# Patient Record
Sex: FEMALE | Employment: OTHER | URBAN - METROPOLITAN AREA
[De-identification: names, ages, dates, MRNs, and addresses within clinical notes are randomized per-mention and may not be internally consistent; named-entity substitution may affect disease eponyms.]

---

## 2017-01-22 ENCOUNTER — HOSPITAL ENCOUNTER (EMERGENCY)
Age: 82
Discharge: HOME | End: 2017-01-22
Attending: EMERGENCY MEDICINE
Payer: MEDICARE

## 2017-01-22 ENCOUNTER — APPOINTMENT (OUTPATIENT)
Dept: GENERAL RADIOLOGY | Age: 82
End: 2017-01-22
Attending: EMERGENCY MEDICINE
Payer: MEDICARE

## 2017-01-22 VITALS
RESPIRATION RATE: 14 BRPM | DIASTOLIC BLOOD PRESSURE: 62 MMHG | HEART RATE: 59 BPM | WEIGHT: 148 LBS | HEIGHT: 66 IN | TEMPERATURE: 98.2 F | SYSTOLIC BLOOD PRESSURE: 130 MMHG | OXYGEN SATURATION: 94 % | BODY MASS INDEX: 23.78 KG/M2

## 2017-01-22 DIAGNOSIS — M54.2 NECK PAIN: Primary | ICD-10-CM

## 2017-01-22 PROCEDURE — 99283 EMERGENCY DEPT VISIT LOW MDM: CPT

## 2017-01-22 PROCEDURE — 96374 THER/PROPH/DIAG INJ IV PUSH: CPT

## 2017-01-22 PROCEDURE — 72050 X-RAY EXAM NECK SPINE 4/5VWS: CPT

## 2017-01-22 PROCEDURE — 96375 TX/PRO/DX INJ NEW DRUG ADDON: CPT

## 2017-01-22 PROCEDURE — 74011250636 HC RX REV CODE- 250/636: Performed by: EMERGENCY MEDICINE

## 2017-01-22 RX ORDER — KETOROLAC TROMETHAMINE 30 MG/ML
15 INJECTION, SOLUTION INTRAMUSCULAR; INTRAVENOUS
Status: COMPLETED | OUTPATIENT
Start: 2017-01-22 | End: 2017-01-22

## 2017-01-22 RX ORDER — TRAMADOL HYDROCHLORIDE 50 MG/1
50 TABLET ORAL
Qty: 20 TAB | Refills: 0 | Status: SHIPPED | OUTPATIENT
Start: 2017-01-22 | End: 2018-07-19

## 2017-01-22 RX ORDER — ONDANSETRON 2 MG/ML
8 INJECTION INTRAMUSCULAR; INTRAVENOUS
Status: COMPLETED | OUTPATIENT
Start: 2017-01-22 | End: 2017-01-22

## 2017-01-22 RX ORDER — METHYLPREDNISOLONE 4 MG/1
TABLET ORAL
Qty: 1 DOSE PACK | Refills: 0 | Status: SHIPPED | OUTPATIENT
Start: 2017-01-22 | End: 2018-07-19

## 2017-01-22 RX ORDER — CYCLOBENZAPRINE HCL 5 MG
5 TABLET ORAL
Qty: 15 TAB | Refills: 0 | Status: SHIPPED | OUTPATIENT
Start: 2017-01-22 | End: 2018-07-19

## 2017-01-22 RX ORDER — DEXAMETHASONE SODIUM PHOSPHATE 4 MG/ML
6 INJECTION, SOLUTION INTRA-ARTICULAR; INTRALESIONAL; INTRAMUSCULAR; INTRAVENOUS; SOFT TISSUE
Status: COMPLETED | OUTPATIENT
Start: 2017-01-22 | End: 2017-01-22

## 2017-01-22 RX ORDER — MORPHINE SULFATE 2 MG/ML
4 INJECTION, SOLUTION INTRAMUSCULAR; INTRAVENOUS
Status: COMPLETED | OUTPATIENT
Start: 2017-01-22 | End: 2017-01-22

## 2017-01-22 RX ADMIN — Medication 4 MG: at 15:57

## 2017-01-22 RX ADMIN — DEXAMETHASONE SODIUM PHOSPHATE 6 MG: 4 INJECTION, SOLUTION INTRAMUSCULAR; INTRAVENOUS at 15:58

## 2017-01-22 RX ADMIN — KETOROLAC TROMETHAMINE 15 MG: 30 INJECTION, SOLUTION INTRAMUSCULAR at 14:47

## 2017-01-22 RX ADMIN — ONDANSETRON 8 MG: 2 INJECTION INTRAMUSCULAR; INTRAVENOUS at 15:58

## 2017-01-22 NOTE — Clinical Note
Home to rest and use warm moist compresses to the left side of the neck four times a day for 20 minutes at a time. See your own MD for continued difficulty. It is possible for you to have a nerve root impingement producing these symptoms.

## 2017-01-22 NOTE — ED PROVIDER NOTES
HPI Comments: [de-identified] y.o. female with past medical history significant for HTN, fluid retention in legs, L IFRAH, and breast cancer  who presents accompanied by her  with chief complaint of neck pain. Pt c/o severe gradually worsening pain along the L side of her posterior and lateral neck. The pt says that her neck pain worsened after she was looking down for an extended period of time while sowing. Pt reports a hx of intermittent neck pain for the past 2-3 years after she hurt her neck in hyperextension while painting a ceiling. The pt says that she never saw an orthopedist after originally hurting her neck. Pt reports some lower back pain. Pt says that Tylenol has failed to offer significant relief and states that she does not want narcotics. Denies radicular pain in her arms and legs. Denies any numbness. There are no other acute medical concerns at this time. Social hx: Current smoker. PCP: Christine Howard MD    Note written by Kathy Cuellar, as dictated by Casper Myers MD 2:36 PM       The history is provided by the patient. No  was used. Past Medical History:   Diagnosis Date    Breast CA ([address]     Fluid retention in legs     Hypertension        Past Surgical History:   Procedure Laterality Date    Hx orthopaedic  2007     left hip replacement         History reviewed. No pertinent family history. Social History     Social History    Marital status:      Spouse name: N/A    Number of children: N/A    Years of education: N/A     Occupational History    Not on file. Social History Main Topics    Smoking status: Current Every Day Smoker    Smokeless tobacco: Not on file    Alcohol use 0.0 oz/week    Drug use: Not on file    Sexual activity: Not on file     Other Topics Concern    Not on file     Social History Narrative         ALLERGIES: Darvocet a500 [propoxyphene n-acetaminophen];  Percocet [oxycodone-acetaminophen]; and Rocephin [ceftriaxone]    Review of Systems   Constitutional: Negative for activity change, appetite change and fatigue. HENT: Negative for ear pain, facial swelling, sore throat and trouble swallowing. Eyes: Negative for pain, discharge and visual disturbance. Respiratory: Negative for chest tightness, shortness of breath and wheezing. Cardiovascular: Negative for chest pain and palpitations. Gastrointestinal: Negative for abdominal pain, blood in stool, nausea and vomiting. Genitourinary: Negative for difficulty urinating, flank pain and hematuria. Musculoskeletal: Positive for neck pain (left). Negative for arthralgias, joint swelling and myalgias. Skin: Negative for color change and rash. Neurological: Negative for dizziness, weakness, numbness and headaches. Hematological: Negative for adenopathy. Does not bruise/bleed easily. Psychiatric/Behavioral: Negative for behavioral problems, confusion and sleep disturbance. All other systems reviewed and are negative. Vitals:    01/22/17 1232   BP: 168/87   Pulse: 98   Resp: 14   Temp: 98 °F (36.7 °C)   SpO2: 99%   Weight: 67.1 kg (148 lb)   Height: 5' 6\" (1.676 m)            Physical Exam   Constitutional: She is oriented to person, place, and time. She appears well-developed and well-nourished. No distress. She is in obvious distress. HENT:   Head: Normocephalic and atraumatic. Nose: Nose normal.   Mouth/Throat: Oropharynx is clear and moist.   Eyes: Conjunctivae and EOM are normal. Pupils are equal, round, and reactive to light. No scleral icterus. Neck: Normal range of motion. Neck supple. No JVD present. Muscular tenderness (from the base of the skull left to the midline, down the lateral neck, to the dorsal L shoulder) present. No spinous process tenderness present. No tracheal deviation present. No thyromegaly present. No muscle spasms noted. Nontender along the R side of the neck. No carotid bruits noted.    Cardiovascular: Normal rate, regular rhythm, normal heart sounds and intact distal pulses. Exam reveals no gallop and no friction rub. No murmur heard. Pulmonary/Chest: Effort normal and breath sounds normal. No respiratory distress. She has no wheezes. She has no rales. She exhibits no tenderness. Abdominal: Soft. Bowel sounds are normal. She exhibits no distension and no mass. There is no tenderness. There is no rebound and no guarding. Musculoskeletal: Normal range of motion. She exhibits no edema or tenderness. Lymphadenopathy:     She has no cervical adenopathy. Neurological: She is alert and oriented to person, place, and time. She has normal reflexes. No cranial nerve deficit. Coordination normal.   No peripheral motor sensory deficits. Skin: Skin is warm and dry. No rash noted. No erythema. Psychiatric: She has a normal mood and affect. Her behavior is normal. Judgment and thought content normal.   Nursing note and vitals reviewed. Note written by Kathy Triana, as dictated by Rob Bloch, MD 2:18 PM    MDM  Number of Diagnoses or Management Options  Neck pain: new and requires workup     Amount and/or Complexity of Data Reviewed  Clinical lab tests: ordered and reviewed  Tests in the radiology section of CPT®: ordered and reviewed  Decide to obtain previous medical records or to obtain history from someone other than the patient: yes  Review and summarize past medical records: yes  Discuss the patient with other providers: yes  Independent visualization of images, tracings, or specimens: yes    Risk of Complications, Morbidity, and/or Mortality  Presenting problems: high  Diagnostic procedures: high  Management options: high    Patient Progress  Patient progress: stable    ED Course       Procedures      PROGRESS NOTE:  3:38 PM Pt is not a whole lot better with Toradol. We will try morphine and Zofran.

## 2017-01-22 NOTE — ED TRIAGE NOTES
Triage Note:  Pt arrived complaining of neck pain. Francestrent Frank been doing a lot of pushing. I hurt my neck a few years ago. \"  Onset 3 days ago.

## 2018-07-19 ENCOUNTER — HOSPITAL ENCOUNTER (INPATIENT)
Age: 83
LOS: 2 days | Discharge: HOME HEALTH | DRG: 871 | End: 2018-07-21
Attending: EMERGENCY MEDICINE | Admitting: FAMILY MEDICINE
Payer: MEDICARE

## 2018-07-19 ENCOUNTER — APPOINTMENT (OUTPATIENT)
Dept: GENERAL RADIOLOGY | Age: 83
DRG: 871 | End: 2018-07-19
Attending: PHYSICIAN ASSISTANT
Payer: MEDICARE

## 2018-07-19 ENCOUNTER — APPOINTMENT (OUTPATIENT)
Dept: CT IMAGING | Age: 83
DRG: 871 | End: 2018-07-19
Attending: FAMILY MEDICINE
Payer: MEDICARE

## 2018-07-19 DIAGNOSIS — R50.9 FEVER, UNSPECIFIED FEVER CAUSE: ICD-10-CM

## 2018-07-19 DIAGNOSIS — N39.0 URINARY TRACT INFECTION WITH HEMATURIA, SITE UNSPECIFIED: Primary | ICD-10-CM

## 2018-07-19 DIAGNOSIS — R31.9 URINARY TRACT INFECTION WITH HEMATURIA, SITE UNSPECIFIED: Primary | ICD-10-CM

## 2018-07-19 DIAGNOSIS — R41.82 ALTERED MENTAL STATUS, UNSPECIFIED ALTERED MENTAL STATUS TYPE: ICD-10-CM

## 2018-07-19 LAB
ALBUMIN SERPL-MCNC: 3.4 G/DL (ref 3.5–5)
ALBUMIN/GLOB SERPL: 0.9 {RATIO} (ref 1.1–2.2)
ALP SERPL-CCNC: 60 U/L (ref 45–117)
ALT SERPL-CCNC: 16 U/L (ref 12–78)
ANION GAP SERPL CALC-SCNC: 11 MMOL/L (ref 5–15)
APPEARANCE UR: ABNORMAL
AST SERPL-CCNC: 18 U/L (ref 15–37)
ATRIAL RATE: 78 BPM
BACTERIA URNS QL MICRO: ABNORMAL /HPF
BASOPHILS # BLD: 0 K/UL (ref 0–0.1)
BASOPHILS NFR BLD: 0 % (ref 0–1)
BILIRUB SERPL-MCNC: 1 MG/DL (ref 0.2–1)
BILIRUB UR QL: NEGATIVE
BUN SERPL-MCNC: 18 MG/DL (ref 6–20)
BUN/CREAT SERPL: 17 (ref 12–20)
CALCIUM SERPL-MCNC: 8.6 MG/DL (ref 8.5–10.1)
CALCULATED P AXIS, ECG09: 85 DEGREES
CALCULATED R AXIS, ECG10: -40 DEGREES
CALCULATED T AXIS, ECG11: 119 DEGREES
CHLORIDE SERPL-SCNC: 99 MMOL/L (ref 97–108)
CO2 SERPL-SCNC: 26 MMOL/L (ref 21–32)
COLOR UR: ABNORMAL
CREAT SERPL-MCNC: 1.06 MG/DL (ref 0.55–1.02)
DIAGNOSIS, 93000: NORMAL
DIFFERENTIAL METHOD BLD: ABNORMAL
EOSINOPHIL # BLD: 0 K/UL (ref 0–0.4)
EOSINOPHIL NFR BLD: 0 % (ref 0–7)
EPITH CASTS URNS QL MICRO: ABNORMAL /LPF
ERYTHROCYTE [DISTWIDTH] IN BLOOD BY AUTOMATED COUNT: 14.3 % (ref 11.5–14.5)
GLOBULIN SER CALC-MCNC: 3.8 G/DL (ref 2–4)
GLUCOSE SERPL-MCNC: 110 MG/DL (ref 65–100)
GLUCOSE UR STRIP.AUTO-MCNC: NEGATIVE MG/DL
HCT VFR BLD AUTO: 38.8 % (ref 35–47)
HGB BLD-MCNC: 12.9 G/DL (ref 11.5–16)
HGB UR QL STRIP: ABNORMAL
IMM GRANULOCYTES # BLD: 0.1 K/UL (ref 0–0.04)
IMM GRANULOCYTES NFR BLD AUTO: 0 % (ref 0–0.5)
KETONES UR QL STRIP.AUTO: 15 MG/DL
LACTATE SERPL-SCNC: 1.4 MMOL/L (ref 0.4–2)
LEUKOCYTE ESTERASE UR QL STRIP.AUTO: ABNORMAL
LYMPHOCYTES # BLD: 1.4 K/UL (ref 0.8–3.5)
LYMPHOCYTES NFR BLD: 9 % (ref 12–49)
MCH RBC QN AUTO: 33.9 PG (ref 26–34)
MCHC RBC AUTO-ENTMCNC: 33.2 G/DL (ref 30–36.5)
MCV RBC AUTO: 102.1 FL (ref 80–99)
MONOCYTES # BLD: 2 K/UL (ref 0–1)
MONOCYTES NFR BLD: 12 % (ref 5–13)
NEUTS SEG # BLD: 12.8 K/UL (ref 1.8–8)
NEUTS SEG NFR BLD: 79 % (ref 32–75)
NITRITE UR QL STRIP.AUTO: NEGATIVE
NRBC # BLD: 0 K/UL (ref 0–0.01)
NRBC BLD-RTO: 0 PER 100 WBC
P-R INTERVAL, ECG05: 150 MS
PH UR STRIP: 5.5 [PH] (ref 5–8)
PLATELET # BLD AUTO: 133 K/UL (ref 150–400)
PMV BLD AUTO: 12.4 FL (ref 8.9–12.9)
POTASSIUM SERPL-SCNC: 3.3 MMOL/L (ref 3.5–5.1)
PROT SERPL-MCNC: 7.2 G/DL (ref 6.4–8.2)
PROT UR STRIP-MCNC: 30 MG/DL
Q-T INTERVAL, ECG07: 444 MS
QRS DURATION, ECG06: 170 MS
QTC CALCULATION (BEZET), ECG08: 506 MS
RBC # BLD AUTO: 3.8 M/UL (ref 3.8–5.2)
RBC #/AREA URNS HPF: ABNORMAL /HPF (ref 0–5)
SODIUM SERPL-SCNC: 136 MMOL/L (ref 136–145)
SP GR UR REFRACTOMETRY: 1.01 (ref 1–1.03)
TROPONIN I SERPL-MCNC: <0.05 NG/ML
TSH SERPL DL<=0.05 MIU/L-ACNC: 0.49 UIU/ML (ref 0.36–3.74)
UR CULT HOLD, URHOLD: NORMAL
UROBILINOGEN UR QL STRIP.AUTO: 0.2 EU/DL (ref 0.2–1)
VENTRICULAR RATE, ECG03: 78 BPM
VIT B12 SERPL-MCNC: 303 PG/ML (ref 193–986)
WBC # BLD AUTO: 16.3 K/UL (ref 3.6–11)
WBC URNS QL MICRO: >100 /HPF (ref 0–4)

## 2018-07-19 PROCEDURE — 74011250637 HC RX REV CODE- 250/637: Performed by: EMERGENCY MEDICINE

## 2018-07-19 PROCEDURE — 70450 CT HEAD/BRAIN W/O DYE: CPT

## 2018-07-19 PROCEDURE — 87077 CULTURE AEROBIC IDENTIFY: CPT | Performed by: EMERGENCY MEDICINE

## 2018-07-19 PROCEDURE — 93005 ELECTROCARDIOGRAM TRACING: CPT

## 2018-07-19 PROCEDURE — 74011250636 HC RX REV CODE- 250/636: Performed by: EMERGENCY MEDICINE

## 2018-07-19 PROCEDURE — 96360 HYDRATION IV INFUSION INIT: CPT

## 2018-07-19 PROCEDURE — 96361 HYDRATE IV INFUSION ADD-ON: CPT

## 2018-07-19 PROCEDURE — 82607 VITAMIN B-12: CPT | Performed by: EMERGENCY MEDICINE

## 2018-07-19 PROCEDURE — 65660000000 HC RM CCU STEPDOWN

## 2018-07-19 PROCEDURE — 71046 X-RAY EXAM CHEST 2 VIEWS: CPT

## 2018-07-19 PROCEDURE — 36415 COLL VENOUS BLD VENIPUNCTURE: CPT | Performed by: PHYSICIAN ASSISTANT

## 2018-07-19 PROCEDURE — 87086 URINE CULTURE/COLONY COUNT: CPT | Performed by: EMERGENCY MEDICINE

## 2018-07-19 PROCEDURE — 80053 COMPREHEN METABOLIC PANEL: CPT | Performed by: PHYSICIAN ASSISTANT

## 2018-07-19 PROCEDURE — 84484 ASSAY OF TROPONIN QUANT: CPT | Performed by: PHYSICIAN ASSISTANT

## 2018-07-19 PROCEDURE — 81001 URINALYSIS AUTO W/SCOPE: CPT | Performed by: PHYSICIAN ASSISTANT

## 2018-07-19 PROCEDURE — 74011250637 HC RX REV CODE- 250/637: Performed by: FAMILY MEDICINE

## 2018-07-19 PROCEDURE — 85025 COMPLETE CBC W/AUTO DIFF WBC: CPT | Performed by: PHYSICIAN ASSISTANT

## 2018-07-19 PROCEDURE — 74011250636 HC RX REV CODE- 250/636: Performed by: FAMILY MEDICINE

## 2018-07-19 PROCEDURE — 99284 EMERGENCY DEPT VISIT MOD MDM: CPT

## 2018-07-19 PROCEDURE — 83605 ASSAY OF LACTIC ACID: CPT | Performed by: PHYSICIAN ASSISTANT

## 2018-07-19 PROCEDURE — 87186 SC STD MICRODIL/AGAR DIL: CPT | Performed by: EMERGENCY MEDICINE

## 2018-07-19 PROCEDURE — 84443 ASSAY THYROID STIM HORMONE: CPT | Performed by: EMERGENCY MEDICINE

## 2018-07-19 PROCEDURE — 87040 BLOOD CULTURE FOR BACTERIA: CPT | Performed by: PHYSICIAN ASSISTANT

## 2018-07-19 RX ORDER — ONDANSETRON 2 MG/ML
4 INJECTION INTRAMUSCULAR; INTRAVENOUS
Status: CANCELLED | OUTPATIENT
Start: 2018-07-19

## 2018-07-19 RX ORDER — LEVOFLOXACIN 5 MG/ML
750 INJECTION, SOLUTION INTRAVENOUS EVERY 24 HOURS
Status: DISCONTINUED | OUTPATIENT
Start: 2018-07-19 | End: 2018-07-19 | Stop reason: DRUGHIGH

## 2018-07-19 RX ORDER — POTASSIUM CHLORIDE 750 MG/1
10 TABLET, FILM COATED, EXTENDED RELEASE ORAL
Status: COMPLETED | OUTPATIENT
Start: 2018-07-19 | End: 2018-07-19

## 2018-07-19 RX ORDER — LEVOTHYROXINE SODIUM 75 UG/1
75 TABLET ORAL
COMMUNITY
Start: 2020-02-12 | End: 2022-10-21

## 2018-07-19 RX ORDER — SODIUM CHLORIDE 9 MG/ML
75 INJECTION, SOLUTION INTRAVENOUS CONTINUOUS
Status: DISCONTINUED | OUTPATIENT
Start: 2018-07-19 | End: 2018-07-21 | Stop reason: HOSPADM

## 2018-07-19 RX ORDER — ESCITALOPRAM OXALATE 10 MG/1
5 TABLET ORAL DAILY
Status: CANCELLED | OUTPATIENT
Start: 2018-07-20

## 2018-07-19 RX ORDER — LEVOFLOXACIN 5 MG/ML
750 INJECTION, SOLUTION INTRAVENOUS
Status: DISCONTINUED | OUTPATIENT
Start: 2018-07-21 | End: 2018-07-20

## 2018-07-19 RX ORDER — ESCITALOPRAM OXALATE 20 MG/1
20 TABLET ORAL DAILY
COMMUNITY

## 2018-07-19 RX ORDER — LEVOFLOXACIN 5 MG/ML
750 INJECTION, SOLUTION INTRAVENOUS
Status: COMPLETED | OUTPATIENT
Start: 2018-07-19 | End: 2018-07-19

## 2018-07-19 RX ORDER — LEVOTHYROXINE SODIUM 50 UG/1
100 TABLET ORAL
Status: DISCONTINUED | OUTPATIENT
Start: 2018-07-20 | End: 2018-07-21 | Stop reason: HOSPADM

## 2018-07-19 RX ORDER — SODIUM CHLORIDE 0.9 % (FLUSH) 0.9 %
5-10 SYRINGE (ML) INJECTION EVERY 8 HOURS
Status: DISCONTINUED | OUTPATIENT
Start: 2018-07-19 | End: 2018-07-21 | Stop reason: HOSPADM

## 2018-07-19 RX ORDER — SODIUM CHLORIDE 0.9 % (FLUSH) 0.9 %
5-10 SYRINGE (ML) INJECTION AS NEEDED
Status: DISCONTINUED | OUTPATIENT
Start: 2018-07-19 | End: 2018-07-21 | Stop reason: HOSPADM

## 2018-07-19 RX ORDER — ACETAMINOPHEN 325 MG/1
650 TABLET ORAL
Status: COMPLETED | OUTPATIENT
Start: 2018-07-19 | End: 2018-07-19

## 2018-07-19 RX ORDER — ACETAMINOPHEN 325 MG/1
650 TABLET ORAL
Status: DISCONTINUED | OUTPATIENT
Start: 2018-07-19 | End: 2018-07-21 | Stop reason: HOSPADM

## 2018-07-19 RX ADMIN — LEVOFLOXACIN 750 MG: 5 INJECTION, SOLUTION INTRAVENOUS at 16:53

## 2018-07-19 RX ADMIN — ACETAMINOPHEN 650 MG: 325 TABLET ORAL at 16:53

## 2018-07-19 RX ADMIN — Medication 10 ML: at 22:00

## 2018-07-19 RX ADMIN — SODIUM CHLORIDE 75 ML/HR: 900 INJECTION, SOLUTION INTRAVENOUS at 19:46

## 2018-07-19 RX ADMIN — SODIUM CHLORIDE 1000 ML: 900 INJECTION, SOLUTION INTRAVENOUS at 14:47

## 2018-07-19 RX ADMIN — SODIUM CHLORIDE 1000 ML: 900 INJECTION, SOLUTION INTRAVENOUS at 16:53

## 2018-07-19 RX ADMIN — POTASSIUM CHLORIDE 10 MEQ: 750 TABLET, EXTENDED RELEASE ORAL at 18:06

## 2018-07-19 NOTE — ED TRIAGE NOTES
Triage Note: Patient is coming in for chills and generalized weakness for the past 2 days. Patient started in PT last week for balance issues but has not gone this week due to the increased weakness. Daughter is concerned she might have a UTI and having more memory issues.

## 2018-07-19 NOTE — H&P
[address]   HISTORY AND PHYSICAL      [place]  MR#: [id number]  : 1935  ACCOUNT #: [de-identified]   ADMIT DATE: 2018    CHIEF COMPLAINT:  Weakness. HISTORY OF PRESENT ILLNESS:  The patient is an 71-year-old female with a past medical history of depression, recurrent UTIs and history of hypothyroidism who presents to the hospital with above the symptoms. History was obtained from the patient and the patient's daughter-in-law. The daughter-in-law reports that the patient was recently diagnosed with UTI. Took her antibiotic, Bactrim, for a few days, but did not complete her antibiotic course as [facility] was feeling better. \"  Daughter-in-law reports that she was called yesterday by her father-in-law stating that the patient is somewhat confused, is not able to fix him dinner and is more lethargic and weak. This morning the father-in-law called her again and told her that the patient is more confused, is very weak and is not able to get out of bed. The patient was brought to the ER for further management and evaluation. The patient also had a fever of 101.1 on arrival to the ER. The patient is resting in bed, appears to be weak, but lethargic. Denies any other complaints or problems. Denies any headache, blurry vision, sore throat, trouble swallowing, trouble with speech, any chest pain, shortness of breath, cough, any abdominal pain, constipation, diarrhea or focal neurological weakness, recent travel, sick contacts, falls, injuries, hematemesis, hemoptysis or other concerns or problems. PAST MEDICAL HISTORY:  See above. HOME MEDICATIONS:  Lexapro 5 mg daily, Synthroid 100 mcg every day. SOCIAL HISTORY:  Current every day smoker. Denies alcohol use, denies IV drug abuse. Lives at home. REVIEW OF SYSTEMS:  All systems were reviewed and were found to be essentially negative except for the symptoms mentioned above.     FAMILY HISTORY:  Discussed and found to be noncontributory. PHYSICAL EXAMINATION:  VITAL SIGNS:  Temperature 99, pulse 71, respiratory rate 16, blood pressure 109/53, pulse ox 95% room air. On arrival, the patient's temperature was 101.1. GENERAL:  Alert and oriented x2, awake, no acute distress, resting in bed, pleasant female, appears to be stated age. HEENT:  Pupils equal and reactive to light. Dry mucous membranes. Tympanic membranes clear. NECK:  Supple. CHEST:  Clear to auscultation bilaterally. HEART:  S1, S2 were heard. ABDOMEN:  Soft, nontender, nondistended. Bowel sounds are physiologic. EXTREMITIES:  No clubbing, no cyanosis, no edema. NEUROPSYCHIATRIC:  Pleasant mood and affect. Cranial nerves II-XII grossly intact. Sensory grossly within normal limits. DTR 2+/4. Strength 5/5. SKIN:  Warm. LABORATORY DATA:  White count 16.3, hemoglobin 12.9, hematocrit 38.8, platelets 569. Urine shows large amount of leukocyte esterase, greater than 100 WBCs, 0-5 RBC and 2+ bacteria, a large amount of blood. Sodium 136, potassium 3.3, chloride 99, bicarbonate 26, anion gap 11, glucose 110, BUN 18, creatinine 1.06, calcium 8.6, bilirubin total 1.0. ALT 16, AST 18, alkaline phosphatase 60. Lactic acid 1.4, troponin less than 0.05. Blood culture are pending. X-ray of the chest shows no acute findings. ASSESSMENT AND PLAN:  1. Urinary tract infection. Failed outpatient treatment. The patient will be admitted to the hospital.  We will start the patient on broad spectrum IV antibiotics, urine culture. Supportive care with IV hydration, pain control and close monitoring, antipyretics. Further intervention will be per hospital course. Reassess as needed and continue to monitor. 2.  Altered mental status, most likely secondary to #1. Should resolve with treating the underlying cause. Neurovascular check. CT of the head without any acute pathology. Further intervention will be per hospital course. Continue to monitor.   If symptoms persist, may consider further diagnostic tests and may involve other consultants. Continue to monitor. 3.  Hypothyroidism. Continue home medications. We will check a TSH level. 4.  Dementia. Continue to monitor. 5.  GI and DVT prophylaxis. The patient is on SCDs.       Mary Hodges MD MM/GIUSEPPE  D: 07/19/2018 18:16     T: 07/19/2018 18:56  JOB #: [id number]

## 2018-07-19 NOTE — IP AVS SNAPSHOT
[address] [facility] [place] [address] 
[phone] Patient: Elijah Penaloza MRN: [id number] NWK:[id number] About your hospitalization You were admitted on:  July 19, 2018 [facility] on:  July 21, 2018 Why you were hospitalized Your primary diagnosis was:  Uti (Urinary Tract Infection) Follow-up Information Follow up With Details Comments Contact [place] [address] 
1st Floor [facility] [address] 
[phone] Sherri Valencia MD In 1 week Discharge follow up [address] [facility] [address] 
[phone] Discharge Orders None A check areli indicates which time of day the medication should be taken. My Medications START taking these medications Instructions Each Dose to Equal  
 Morning Noon Evening Bedtime  
 amoxicillin-clavulanate 875-125 mg per tablet Commonly known as:  AUGMENTIN Your last dose was: Your next dose is: Take 1 Tab by mouth two (2) times a day. 1 Tab CONTINUE taking these medications Instructions Each Dose to Equal  
 Morning Noon Evening Bedtime  
 escitalopram oxalate 5 mg tablet Commonly known as:  Arnav Lacey Your last dose was: Your next dose is: Take 5 mg by mouth daily. 5 mg  
    
   
   
   
  
 levothyroxine 100 mcg tablet Commonly known as:  SYNTHROID Your last dose was: Your next dose is: Take 100 mcg by mouth Daily (before breakfast). 100 mcg Where to Get Your Medications Information on where to get these meds will be given to you by the nurse or doctor. ! Ask your nurse or doctor about these medications  
  amoxicillin-clavulanate 875-125 mg per tablet Discharge Instructions Please bring this form with you to show your primary care provider at your follow-up appointment. Primary care provider:  Dr. Everardo Guzman MD 
 
Discharging provider:  Jamie Kessler MD 
 
You have been admitted to the hospital with the following diagnoses: 
· UTI (urinary tract infection) FOLLOW-UP CARE RECOMMENDATIONS: 
 
APPOINTMENTS: 
Follow-up Information Follow up With Details Comments Contact Info [address] [place], [place] [address] 
1st Floor [facility] [address] 
[phone] Everardo Guzman MD In 1 week Discharge follow up [address] [facility] [address] 
[phone] FOLLOW-UP TESTS recommended: none PENDING TEST RESULTS: 
At the time of your discharge the following test results are still pending: none Please make sure you review these results with your outpatient follow-up provider(s). SYMPTOMS to watch for: chest pain, shortness of breath, fever, chills, nausea, vomiting, diarrhea, change in mentation, falling, weakness, bleeding. DIET/what to eat:  Regular Diet ACTIVITY:  Activity as tolerated and PT/OT per Home Health 
 
WOUND CARE: NONE 
 
EQUIPMENT needed:  NONE What to do if new or unexpected symptoms occur? If you experience any of the above symptoms (or should other concerns or questions arise after discharge) please call your primary care physician. Return to the emergency room if you cannot get hold of your doctor. · It is very important that you keep your follow-up appointment(s). · Please bring discharge papers, medication list (and/or medication bottles) to your follow-up appointments for review by your outpatient provider(s). · Please check the list of medications and be sure it includes every medication (even non-prescription medications) that your provider wants you to take. · It is important that you take the medication exactly as they are prescribed. · Keep your medication in the bottles provided by the pharmacist and keep a list of the medication names, dosages, and times to be taken in your wallet. · Do not take other medications without consulting your doctor. · If you have any questions about your medications or other instructions, please talk to your nurse or care provider before you leave the hospital. 
 
I understand that if any problems occur once I am at home I am to contact my physician. These instructions were explained to me and I had the opportunity to ask questions. [employer] Announcement We are excited to announce that we are making your provider's discharge notes available to you in [employer]. You will see these notes when they are completed and signed by the physician that discharged you from your recent hospital stay. If you have any questions or concerns about any information you see in [employer], please call the Health Information Department where you were seen or reach out to your Primary Care Provider for more information about your plan of care. Introducing [facility]! Chito Mason introduces [employer] patient portal. Now you can access parts of your medical record, email your doctor's office, and request medication refills online. 1. In your internet browser, go to [email or web address] 2. Click on the First Time User? Click Here link in the Sign In box. You will see the New Member Sign Up page. 3. Enter your [employer] Access Code exactly as it appears below. You will not need to use this code after youve completed the sign-up process. If you do not sign up before the expiration date, you must request a new code. · [employer] Access Code: [email or web address] Expires: 10/17/2018 11:28 AM 
 
4. Enter the last four digits of your Social Security Number (xxxx) and Date of Birth (mm/dd/yyyy) as indicated and click Submit. You will be taken to the next sign-up page. 5. Create a [employer] ID. This will be your [employer] login ID and cannot be changed, so think of one that is secure and easy to remember. 6. Create a [employer] password. You can change your password at any time. 7. Enter your Password Reset Question and Answer. This can be used at a later time if you forget your password. 8. Enter your e-mail address. You will receive e-mail notification when new information is available in [address] 9. Click Sign Up. You can now view and download portions of your medical record. 10. Click the Download Summary menu link to download a portable copy of your medical information. If you have questions, please visit the Frequently Asked Questions section of the [employer] website. Remember, [employer] is NOT to be used for urgent needs. For medical emergencies, dial 911. Now available from your iPhone and Android! Introducing [employer] As a [facility] patient, I wanted to make you aware of our electronic visit tool called [employer]. [facility] 24/7 allows you to connect within minutes with a medical provider 24 hours a day, seven days a week via a mobile device or tablet or logging into a secure website from your computer. You can access [employer] from anywhere in the [place]. A virtual visit might be right for you when you have a simple condition and feel like you just dont want to get out of bed, or cant get away from work for an appointment, when your regular [facility] provider is not available (evenings, weekends or holidays), or when youre out of town and need minor care. Electronic visits cost only $49 and if the [facility] 24/7 provider determines a prescription is needed to treat your condition, one can be electronically transmitted to a nearby pharmacy*. Please take a moment to enroll today if you have not already done so. The enrollment process is free and takes just a few minutes.   To enroll, please download the [employer] 24/7 milan to your tablet or phone, or visit [email or web address] to enroll on your computer. And, as an [address] [place] [address] patient with a [employer] account, the results of your visits will be scanned into your electronic medical record and your primary care provider will be able to view the scanned results. We urge you to continue to see your regular [facility] provider for your ongoing medical care. And while your primary care provider may not be the one available when you seek a [employer] virtual visit, the peace of mind you get from getting a real diagnosis real time can be priceless. For more information on [employer], view our Frequently Asked Questions (FAQs) at [email or web address]. Sincerely, 
 
Malka Morris MD 
Chief Medical Officer Denny Karla Chong *:  certain medications cannot be prescribed via [employer] Unresulted Labs-Please follow up with your PCP about these lab tests Order Current Status CULTURE, BLOOD Preliminary result CULTURE, BLOOD Preliminary result Providers Seen During Your Hospitalization Provider Specialty Primary office phone Lisa Santos MD Emergency Medicine [phone] Kassie Alvarado MD Emergency Medicine [phone] Shante Franklin MD Hospitalist [phone] Marley Pan MD Internal Medicine [phone] Your Primary Care Physician (PCP) Primary Care Physician Office Phone Office Fax Jane Hsu [phone] You are allergic to the following Allergen Reactions Sulfamethoxazole-Trimethoprim Hives Treated with diphenhydramine Darvocet A500 (Propoxyphene N-Acetaminophen) Itching Percocet (Oxycodone-Acetaminophen) Itching Rocephin (Ceftriaxone) Swelling Questionable allergy, had facial swelling morning after IV administration Recent Documentation Height Weight BMI OB Status Smoking Status 1.676 m 63.1 kg 22.45 kg/m2 Postmenopausal Current Every Day Smoker Emergency Contacts Name Discharge Info Relation Home Work Mobile SIDNEY Mcmanus Eleazar Sr DISCHARGE CAREGIVER [3] Spouse [3] [phone] Patient Belongings The following personal items are in your possession at time of discharge: 
     Visual Aid: Glasses, At home Please provide this summary of care documentation to your next provider. Signatures-by signing, you are acknowledging that this After Visit Summary has been reviewed with you and you have received a copy. Patient Signature:  ____________________________________________________________ Date:  ____________________________________________________________  
  
Earnstine Marcello Provider Signature:  ____________________________________________________________ Date:  ____________________________________________________________

## 2018-07-19 NOTE — ROUTINE PROCESS
TRANSFER - OUT REPORT:    Verbal report given to Tyesha(name) on Santi Hernández  being transferred to [facility]) for routine progression of care       Report consisted of patients Situation, Background, Assessment and   Recommendations(SBAR). Information from the following report(s) SBAR, ED Summary, Intake/Output, MAR and Recent Results was reviewed with the receiving nurse. Lines:   Peripheral IV 07/19/18 Left Antecubital (Active)   Site Assessment Clean, dry, & intact 7/19/2018  2:39 PM   Phlebitis Assessment 0 7/19/2018  2:39 PM   Infiltration Assessment 0 7/19/2018  2:39 PM   Dressing Status Clean, dry, & intact 7/19/2018  2:39 PM   Hub Color/Line Status Pink 7/19/2018  2:39 PM        Opportunity for questions and clarification was provided.       Patient transported with:   [employer]

## 2018-07-19 NOTE — PROGRESS NOTES
Admission Medication Reconciliation:    Information obtained from: Patient, Family member, Rx bottles     Significant PMH/Disease States:   Past Medical History:   Diagnosis Date    Breast CA ([address]     Fluid retention in legs     Hypertension        Chief Complaint for this Admission:  Chills    Allergies:  Sulfamethoxazole-trimethoprim; Darvocet a500 [propoxyphene n-acetaminophen]; Percocet [oxycodone-acetaminophen]; and Rocephin [ceftriaxone]    Prior to Admission Medications:   Prior to Admission Medications   Prescriptions Last Dose Informant Patient Reported? Taking?   escitalopram oxalate (LEXAPRO) 5 mg tablet 7/19/2018 at Unknown time  Yes Yes   Sig: Take 5 mg by mouth daily. levothyroxine (SYNTHROID) 100 mcg tablet   Yes Yes   Sig: Take 100 mcg by mouth Daily (before breakfast). Facility-Administered Medications: None         Comments/Recommendations: Med rec completed after speaking with the patient and family member. She reports that she only takes 2 medications at home and these were taken today. She also reports stopping bactrim early one month ago due to severe itchy skin that resolved with diphenhydramine.   This was added to her allergy list.1    Removed: Cyclobenzaprine, Donepezil, Ezetimibe, HCTZ, Methylprednisolone, Omeprazole, Paroxetine, Tramadol    Changed: Levothyroxine from 88 mcg to 100 mcg    Added: Escitalopram    Ramana Mcarthur, SamariaD

## 2018-07-19 NOTE — PROGRESS NOTES
Day #1 of Levaquin  Indication:  UTI  Current regimen:  750 mg q24h    Recent Labs      18   1232   WBC  16.3*   CREA  1.06*   BUN  18     Est CrCl: 40 ml/min   Temp (24hrs), Av.3 °F (37.4 °C), Min:98.1 °F (36.7 °C), Max:101.1 °F (38.4 °C)    Plan: Change to 750 mg q48h per renal dose adjustment policy

## 2018-07-19 NOTE — IP AVS SNAPSHOT
[address] [facility] 
[phone] Patient: Zahra Gaines MRN: [id number] P:1/30/7149 A check areli indicates which time of day the medication should be taken. My Medications START taking these medications Instructions Each Dose to Equal  
 Morning Noon Evening Bedtime  
 amoxicillin-clavulanate 875-125 mg per tablet Commonly known as:  AUGMENTIN Your last dose was: Your next dose is: Take 1 Tab by mouth two (2) times a day. 1 Tab CONTINUE taking these medications Instructions Each Dose to Equal  
 Morning Noon Evening Bedtime  
 escitalopram oxalate 5 mg tablet Commonly known as:  Aura Bello Your last dose was: Your next dose is: Take 5 mg by mouth daily. 5 mg  
    
   
   
   
  
 levothyroxine 100 mcg tablet Commonly known as:  SYNTHROID Your last dose was: Your next dose is: Take 100 mcg by mouth Daily (before breakfast). 100 mcg Where to Get Your Medications Information on where to get these meds will be given to you by the nurse or doctor. ! Ask your nurse or doctor about these medications  
  amoxicillin-clavulanate 875-125 mg per tablet

## 2018-07-19 NOTE — ED NOTES
11:26 AM  I have evaluated the patient as the Provider in Triage. I have reviewed Her vital signs and the triage nurse assessment. I have talked with the patient and any available family and advised that I am the provider in triage and have ordered the appropriate study to initiate their work up based on the clinical presentation during my assessment. I have advised that the patient will be accommodated in the Main ED as soon as possible. I have also requested to contact the triage nurse or myself immediately if the patient experiences any changes in their condition during this brief waiting period. Yesterday had chills, nausea today. Daughter notes she as recently treated for a UTI but did not finish medication.   Daughter notes more confusion than usual.      CELESTINA Ching

## 2018-07-19 NOTE — ED PROVIDER NOTES
Patient is a 81 y.o. female presenting with chills. Chills          82y F here with chills. Recently diagnosed with a UTI - only took a few days of her abx (bactrim) because she started to feel better. Now with increased confusion at home. Too sick to take care of herself. Poor PO intake. No reports of fever, vomiting, diarrhea. She denies any pain. No trouble breathing. She does admit to being more forgetful in the past week or so. Past Medical History:   Diagnosis Date    Breast CA ([address]     Fluid retention in legs     Hypertension        Past Surgical History:   Procedure Laterality Date    BREAST SURGERY PROCEDURE UNLISTED      Left lumpectomy    HX ORTHOPAEDIC  2007    left hip replacement         History reviewed. No pertinent family history. Social History     Social History    Marital status:      Spouse name: N/A    Number of children: N/A    Years of education: N/A     Occupational History    Not on file. Social History Main Topics    Smoking status: Current Every Day Smoker    Smokeless tobacco: Not on file    Alcohol use 0.0 oz/week    Drug use: Not on file    Sexual activity: Not on file     Other Topics Concern    Not on file     Social History Narrative         ALLERGIES: Darvocet a500 [propoxyphene n-acetaminophen]; Percocet [oxycodone-acetaminophen]; and Rocephin [ceftriaxone]    Review of Systems   Constitutional: Positive for chills. Review of Systems   Constitutional: (-) weight loss. HEENT: (-) stiff neck   Eyes: (-) discharge. Respiratory: (-) for cough. Cardiovascular: (-) syncope. Gastrointestinal: (-) blood in stool. Genitourinary: (-) hematuria. Musculoskeletal: (-) myalgias. Neurological: (-) seizure. Skin: (-) petechiae  Lymph/Immunologic: (-) enlarged lymph nodes  All other systems reviewed and are negative.         Vitals:    07/19/18 1130   BP: 109/50   Pulse: 73   Resp: 20   Temp: 99.1 °F (37.3 °C)   SpO2: 95%   Weight: 63.3 kg (139 lb 8 oz)   Height: 5' 6\" (1.676 m)            Physical Exam Nursing note and vitals reviewed. Constitutional: oriented to person, place, and time. appears elderly and frail. No distress. Head: Normocephalic and atraumatic. Sclera anicteric  Nose: No rhinorrhea  Mouth/Throat: Oropharynx is clear and moist. Pharynx normal  Eyes: Conjunctivae are normal. Pupils are equal, round, and reactive to light. Right eye exhibits no discharge. Left eye exhibits no discharge. Neck: Painless normal range of motion. Neck supple. No LAD. Cardiovascular: Normal rate, regular rhythm, normal heart sounds and intact distal pulses. Exam reveals no gallop and no friction rub. No murmur heard. Pulmonary/Chest:  No respiratory distress. No wheezes. No rales. No rhonchi. No increased work of breathing. No accessory muscle use. Good air exchange throughout. Abdominal: soft, non-tender, no rebound or guarding. No hepatosplenomegaly. Normal bowel sounds throughout. Back: no tenderness to palpation, no deformities, no CVA tenderness  Extremities/Musculoskeletal: Normal range of motion. no tenderness. No edema. Distal extremities are neurovasc intact. Lymphadenopathy:   No adenopathy. Neurological:  Alert and oriented to person, place, and time. Coordination normal. CN 2-12 intact. Motor and sensory function intact. Skin: Skin is warm and dry. No rash noted. No pallor. MDM 81y F here with functional decline. Suspect partially treated UTI may be contributing. Needs labs, fluids. Likely admit.       ED Course       Procedures

## 2018-07-20 LAB
ANION GAP SERPL CALC-SCNC: 11 MMOL/L (ref 5–15)
BASOPHILS # BLD: 0 K/UL (ref 0–0.1)
BASOPHILS NFR BLD: 0 % (ref 0–1)
BUN SERPL-MCNC: 14 MG/DL (ref 6–20)
BUN/CREAT SERPL: 16 (ref 12–20)
CALCIUM SERPL-MCNC: 8 MG/DL (ref 8.5–10.1)
CHLORIDE SERPL-SCNC: 105 MMOL/L (ref 97–108)
CO2 SERPL-SCNC: 19 MMOL/L (ref 21–32)
CREAT SERPL-MCNC: 0.85 MG/DL (ref 0.55–1.02)
DIFFERENTIAL METHOD BLD: ABNORMAL
EOSINOPHIL # BLD: 0 K/UL (ref 0–0.4)
EOSINOPHIL NFR BLD: 0 % (ref 0–7)
ERYTHROCYTE [DISTWIDTH] IN BLOOD BY AUTOMATED COUNT: 14.4 % (ref 11.5–14.5)
GLUCOSE SERPL-MCNC: 108 MG/DL (ref 65–100)
HCT VFR BLD AUTO: 32.1 % (ref 35–47)
HGB BLD-MCNC: 11 G/DL (ref 11.5–16)
IMM GRANULOCYTES # BLD: 0.1 K/UL (ref 0–0.04)
IMM GRANULOCYTES NFR BLD AUTO: 1 % (ref 0–0.5)
LYMPHOCYTES # BLD: 1.4 K/UL (ref 0.8–3.5)
LYMPHOCYTES NFR BLD: 9 % (ref 12–49)
MCH RBC QN AUTO: 35 PG (ref 26–34)
MCHC RBC AUTO-ENTMCNC: 34.3 G/DL (ref 30–36.5)
MCV RBC AUTO: 102.2 FL (ref 80–99)
MONOCYTES # BLD: 1.9 K/UL (ref 0–1)
MONOCYTES NFR BLD: 12 % (ref 5–13)
NEUTS SEG # BLD: 13.1 K/UL (ref 1.8–8)
NEUTS SEG NFR BLD: 79 % (ref 32–75)
NRBC # BLD: 0 K/UL (ref 0–0.01)
NRBC BLD-RTO: 0 PER 100 WBC
PLATELET # BLD AUTO: 130 K/UL (ref 150–400)
PMV BLD AUTO: 12.6 FL (ref 8.9–12.9)
POTASSIUM SERPL-SCNC: 3.9 MMOL/L (ref 3.5–5.1)
RBC # BLD AUTO: 3.14 M/UL (ref 3.8–5.2)
SODIUM SERPL-SCNC: 135 MMOL/L (ref 136–145)
WBC # BLD AUTO: 16.6 K/UL (ref 3.6–11)

## 2018-07-20 PROCEDURE — 74011250636 HC RX REV CODE- 250/636: Performed by: HOSPITALIST

## 2018-07-20 PROCEDURE — 80048 BASIC METABOLIC PNL TOTAL CA: CPT | Performed by: FAMILY MEDICINE

## 2018-07-20 PROCEDURE — 74011250637 HC RX REV CODE- 250/637: Performed by: FAMILY MEDICINE

## 2018-07-20 PROCEDURE — 65270000029 HC RM PRIVATE

## 2018-07-20 PROCEDURE — 85025 COMPLETE CBC W/AUTO DIFF WBC: CPT | Performed by: FAMILY MEDICINE

## 2018-07-20 PROCEDURE — 97161 PT EVAL LOW COMPLEX 20 MIN: CPT

## 2018-07-20 PROCEDURE — 36415 COLL VENOUS BLD VENIPUNCTURE: CPT | Performed by: FAMILY MEDICINE

## 2018-07-20 PROCEDURE — 74011250637 HC RX REV CODE- 250/637: Performed by: HOSPITALIST

## 2018-07-20 PROCEDURE — 74011000258 HC RX REV CODE- 258: Performed by: HOSPITALIST

## 2018-07-20 RX ORDER — ESCITALOPRAM OXALATE 10 MG/1
5 TABLET ORAL DAILY
Status: DISCONTINUED | OUTPATIENT
Start: 2018-07-20 | End: 2018-07-21 | Stop reason: HOSPADM

## 2018-07-20 RX ADMIN — ESCITALOPRAM OXALATE 5 MG: 10 TABLET ORAL at 14:20

## 2018-07-20 RX ADMIN — ACETAMINOPHEN 650 MG: 325 TABLET ORAL at 02:37

## 2018-07-20 RX ADMIN — LEVOTHYROXINE SODIUM 100 MCG: 100 TABLET ORAL at 06:30

## 2018-07-20 RX ADMIN — AMPICILLIN SODIUM 1 G: 1 INJECTION, POWDER, FOR SOLUTION INTRAMUSCULAR; INTRAVENOUS at 14:24

## 2018-07-20 RX ADMIN — Medication 10 ML: at 06:00

## 2018-07-20 RX ADMIN — Medication 10 ML: at 14:00

## 2018-07-20 RX ADMIN — AMPICILLIN SODIUM 1 G: 1 INJECTION, POWDER, FOR SOLUTION INTRAMUSCULAR; INTRAVENOUS at 19:00

## 2018-07-20 NOTE — PROGRESS NOTES
TRANSFER - OUT REPORT:    Verbal report given to Star(name) on Chepe Aranda  being transferred to 5 S(unit) for routine progression of care       Report consisted of patients Situation, Background, Assessment and   Recommendations(SBAR). Information from the following report(s) SBAR, Kardex, Intake/Output, MAR and Recent Results was reviewed with the receiving nurse. Lines:   Peripheral IV 07/20/18 Right Arm (Active)   Site Assessment Clean, dry, & intact 7/19/2018  8:00 PM   Phlebitis Assessment 0 7/19/2018  8:00 PM   Infiltration Assessment 0 7/19/2018  8:00 PM   Dressing Status Clean, dry, & intact 7/19/2018  8:00 PM   Dressing Type Transparent 7/19/2018  8:00 PM   Hub Color/Line Status Patent 7/19/2018  8:00 PM   Action Taken Open ports on tubing capped 7/19/2018  8:00 PM   Alcohol Cap Used Yes 7/19/2018  8:00 PM        Opportunity for questions and clarification was provided.       Patient transported with:   Monitor  Tech

## 2018-07-20 NOTE — PROGRESS NOTES
TRANSFER - IN REPORT:    Verbal report received from Franco Gudino  on Matthew Blackwood  being received from ER(unit) for routine progression of care      Report consisted of patients Situation, Background, Assessment and   Recommendations(SBAR). Information from the following report(s) SBAR, Kardex, MAR, Recent Results and Cardiac Rhythm NSR was reviewed with the receiving nurse. Opportunity for questions and clarification was provided. Assessment completed upon patients arrival to unit and care assumed. Primary Nurse Mary Jo Calle RN and Kelin Bhardwaj RN performed a dual skin assessment on this patient No impairment noted    Bedside and Verbal shift change report given to Kelin Bhardwaj RN (oncoming nurse) by Marylen Baas, RN (offgoing nurse). Report included the following information SBAR, Kardex, MAR, Recent Results and Cardiac Rhythm NSR.

## 2018-07-20 NOTE — PROGRESS NOTES
Bedside shift change report given to [address] [place] [address] (oncoming nurse) by Ruthy Villa (offgoing nurse). Report included the following information SBAR, Kardex, Intake/Output and MAR.

## 2018-07-20 NOTE — CDMP QUERY
There is documentation of AMS noted on this chart. Please clarify if this patient is (was) being treated/managed for:     => Metabolic Encephalopathy in the setting of UTI requiring Head CT, Neuro checks, monitoring   => Other explanation of clinical findings  => Clinically Undetermined (no explanation for clinical findings)    The medical record reflects the following clinical findings, treatment, and risk factors. Risk Factors:  advanced age, UTI with failed outpt tx  Clinical Indicators:  ED Note- Recently diagnosed with a UTI - only took a few days of her abx (bactrim) because she started to feel better. Now with increased confusion at home; H&P- Daughter-in-law reports that she was called yesterday by her father-in-law stating that the patient is somewhat confused, is not able to fix him dinner and is more lethargic and weak. This morning the father-in-law called her again and told her that the patient is more confused, is very weak and is not able to get out of bed. Treatment: Head CT, Neuro checks, monitoring     Please clarify and document your clinical opinion in the progress notes and discharge summary including the definitive and/or presumptive diagnosis, (suspected or probable), related to the above clinical findings. Please include clinical findings supporting your diagnosis.       Thank you,    Uzair Randall RN  [facility]  [phone] chart(s)/patient/acute care facility

## 2018-07-20 NOTE — PROGRESS NOTES
Hospitalist Progress Note Adela Robbins MD 
Answering service: [phone] [place] [phone] from in house phone Cell: [phone] Date of Service:  2018 NAME:  Fatuma Pan :  1935 MRN:  [id number] Admission Summary:  
82 yo female with PMHx of Depression, Hypothyroidism, admitted for weakness and confusion. Pt was recently treated for UTI with bactrim but had allergic reaction so called doctor who called in a different abx which patient unable to give name of. Pt's daughter in law noticed patient was confused and weak and unable to get out of bed. Interval history / Subjective:  
  Pt seen and examined Awake and alert, oriented to person and place not time Son at bedside who states mother has had cognitive decline over past few months Patient lives with her  and is care giver for  Assessment & Plan:  
 
Sepsis due to UTI 
- UCx: probable enterococcus species - change levaquin to Ampicillin 
- follow final cx Metabolic encephalopathy due to sepsis 
- back to baseline  Per son Weakness due to sepsis 
- PT/OT Hyponatremia - monitor Mild Hypokalemia POA , resolved Dementia without behavioral disturbance - d/w son, he is thinking about [facility] for safety of both parents. He wants patient to go to SNF but I discussed that with patient's dementia it may not be the best choice for her at this time. Will get PT/Ot and see if home health with PT is an option. Son agrees with this. Code status: FULL 
DVT prophylaxis: SCDs Care Plan discussed with: Patient/Family and Nurse Disposition: Home w/Family and HH PT, OT, RN Hospital Problems  Date Reviewed: 2018 Codes Class Noted POA * (Principal)UTI (urinary tract infection) ICD-10-CM: N39.0 ICD-9-CM: 599.0  2018 Unknown Review of Systems: A comprehensive review of systems was negative except for that written in the HPI. Vital Signs:  
 Last 24hrs VS reviewed since prior progress note. Most recent are: 
Visit Vitals  /65 (BP 1 Location: Right arm, BP Patient Position: At rest)  Pulse 71  Temp 99.1 °F (37.3 °C)  Resp 17  Ht 5' 6\" (1.676 m)  Wt 63.1 kg (139 lb 1.8 oz)  SpO2 95%  BMI 22.45 kg/m2 Intake/Output Summary (Last 24 hours) at 07/20/18 1339 Last data filed at 07/20/18 0011 Gross per 24 hour Intake              150 ml Output                0 ml Net              150 ml Physical Examination:  
 
 
     
Constitutional:  No acute distress, cooperative, pleasant   
ENT:  Oral mucous moist, Resp:  CTA bilaterally. CV:  Regular rhythm, normal rate, GI:  Soft, non distended, non tender. normoactive bowel sounds, Musculoskeletal:  No edema, warm, 2+ pulses throughout Neurologic:  Moves all extremities. AAOx2 Skin:  Good turgor, no rashes or ulcers Data Review:  
 Review and/or order of clinical lab test 
Review and/or order of tests in the radiology section of CPT Review and/or order of tests in the medicine section of CPT Labs:  
 
Recent Labs  
   07/20/18 
 0233  07/19/18 
 1232 WBC  16.6*  16.3* HGB  11.0*  12.9 HCT  32.1*  38.8 PLT  130*  133* Recent Labs  
   07/20/18 
 0233  07/19/18 
 1232 NA  135*  136  
K  3.9  3.3*  
CL  105  99 CO2  19*  26 BUN  14  18 CREA  0.85  1.06* GLU  108*  110* CA  8.0*  8.6 Recent Labs  
   07/19/18 
 1232 SGOT  18 ALT  16 AP  60 TBILI  1.0 TP  7.2 ALB  3.4*  
GLOB  3.8 No results for input(s): INR, PTP, APTT in the last 72 hours. No lab exists for component: INREXT No results for input(s): FE, TIBC, PSAT, FERR in the last 72 hours. No results found for: FOL, RBCF No results for input(s): PH, PCO2, PO2 in the last 72 hours. Recent Labs  
   07/19/18 
 1232 TROIQ  <0.05 No results found for: CHOL, CHOLX, CHLST, CHOLV, HDL, LDL, LDLC, DLDLP, TGLX, TRIGL, TRIGP, [address], CHHDX No results found for: Nikolay Malagon Lab Results Component Value Date/Time Color YELLOW/STRAW 07/19/2018 03:51 PM  
 Appearance TURBID (A) 07/19/2018 03:51 PM  
 Specific gravity 1.012 07/19/2018 03:51 PM  
 pH (UA) 5.5 07/19/2018 03:51 PM  
 Protein 30 (A) 07/19/2018 03:51 PM  
 Glucose NEGATIVE  07/19/2018 03:51 PM  
 Ketone 15 (A) 07/19/2018 03:51 PM  
 Bilirubin NEGATIVE  07/19/2018 03:51 PM  
 Urobilinogen 0.2 07/19/2018 03:51 PM  
 Nitrites NEGATIVE  07/19/2018 03:51 PM  
 Leukocyte Esterase LARGE (A) 07/19/2018 03:51 PM  
 Epithelial cells MODERATE (A) 07/19/2018 03:51 PM  
 Bacteria 2+ (A) 07/19/2018 03:51 PM  
 WBC >100 (H) 07/19/2018 03:51 PM  
 RBC 0-5 07/19/2018 03:51 PM  
 
 
 
Medications Reviewed:  
 
Current Facility-Administered Medications Medication Dose Route Frequency  ampicillin (OMNIPEN) 1 g in 0.9% sodium chloride (MBP/ADV) 50 mL  1 g IntraVENous Q6H  
 levothyroxine (SYNTHROID) tablet 100 mcg  100 mcg Oral ACB  sodium chloride (NS) flush 5-10 mL  5-10 mL IntraVENous Q8H  
 sodium chloride (NS) flush 5-10 mL  5-10 mL IntraVENous PRN  
 0.9% sodium chloride infusion  75 mL/hr IntraVENous CONTINUOUS  
 acetaminophen (TYLENOL) tablet 650 mg  650 mg Oral Q4H PRN  
 
______________________________________________________________________ EXPECTED LENGTH OF STAY: 3d 0h 
ACTUAL LENGTH OF STAY:          1 Nazanin Velez MD

## 2018-07-20 NOTE — PROGRESS NOTES
TRANSFER - IN REPORT:    Verbal report received from Kusum Borjas RN(name) on Parvez Esteves  being received from [facility]) for routine progression of care      Report consisted of patients Situation, Background, Assessment and   Recommendations(SBAR). Information from the following report(s) SBAR, Kardex, Intake/Output and MAR was reviewed with the receiving nurse. Opportunity for questions and clarification was provided. Assessment completed upon patients arrival to unit and care assumed.

## 2018-07-20 NOTE — PROGRESS NOTES
Problem: Falls - Risk of  Goal: *Absence of Falls  Document Delgado Fall Risk and appropriate interventions in the flowsheet. Outcome: Progressing Towards Goal  Fall Risk Interventions:  Mobility Interventions: Patient to call before getting OOB       Patient calls for assistance when needed. Elimination Interventions: Call light in reach       Bedside shift change report given to Tyesha RN (oncoming nurse) by Rustam Bains RN (offgoing nurse). Report included the following information SBAR, Kardex, Intake/Output, MAR, Accordion and Recent Results.

## 2018-07-20 NOTE — PROGRESS NOTES
Reason for Admission:   UTI with AMS                   RRAT Score:          5           Plan for utilizing home health:      TBD                    Likelihood of Readmission:  low                         Transition of Care Plan:         IV ABX for UTI. Patient had previously been diagnosed with a UTI, but per her daughter-in-law, patient did not finish ABX because she felt better. CM to follow.

## 2018-07-20 NOTE — CDMP QUERY
Please clarify if this patient is (was) being treated/managed for:     => Mild Hypokalemia POA in the setting of UTI and poor po intake requiring supplemental potassium and lab monitoring   => Other explanation of clinical findings  => Clinically Undetermined (no explanation for clinical findings)    The medical record reflects the following clinical findings, treatment, and risk factors. Risk Factors:  UTI and poor po intake   Clinical Indicators:  Potassium: 3.3  Treatment: supplemental potassium and lab monitoring    Please clarify and document your clinical opinion in the progress notes and discharge summary including the definitive and/or presumptive diagnosis, (suspected or probable), related to the above clinical findings. Please include clinical findings supporting your diagnosis.     Thank you,    Eb Driver RN  [facility]  [phone]

## 2018-07-20 NOTE — PROGRESS NOTES
Problem: Mobility Impaired (Adult and Pediatric)  Goal: *Acute Goals and Plan of Care (Insert Text)  Physical Therapy Goals  Initiated 7/20/2018    2. Patient will transfer from bed to chair and chair to bed with independence using the least restrictive device within 7 day(s). 3.  Patient will perform sit to stand with independence within 7 day(s). 4.  Patient will ambulate with independence for 150 feet with the least restrictive device within 7 day(s). 5.  Patient will ascend/descend 4 stairs with 2 handrail(s) with modified independence within 7 day(s). physical Therapy EVALUATION  Patient: Santi Hernández (13 y.o. female)  Date: 7/20/2018  Primary Diagnosis: UTI (urinary tract infection)        Precautions:        ASSESSMENT :  Based on the objective data described below, the patient presents with decreased balance and independence with mobility following admission for UTI. PTA patient was independent with mobility and the primary caregiver for her  with dementia. She reports no history of falls and ability to assist  with ADL's as needed. She's overall supervision for transfers and CGA for ambulation without AD. Patient with slight path deviations during ambulation but no overt LOB noted. Patient states she overall feels better and has no concerns with discharge home. She states her daughter is an OT and plans to get her a rollator at some point. Recommend HHPT at discharge. Patient will benefit from skilled intervention to address the above impairments.   Patients rehabilitation potential is considered to be Good  Factors which may influence rehabilitation potential include:   []         None noted  []         Mental ability/status  [x]         Medical condition  []         Home/family situation and support systems  []         Safety awareness  []         Pain tolerance/management  []         Other:      PLAN :  Recommendations and Planned Interventions:  [x]           Bed Mobility Training             [x]    Neuromuscular Re-Education  [x]           Transfer Training                   []    Orthotic/Prosthetic Training  [x]           Gait Training                         []    Modalities  [x]           Therapeutic Exercises           []    Edema Management/Control  [x]           Therapeutic Activities            [x]    Patient and Family Training/Education  []           Other (comment):    Frequency/Duration: Patient will be followed by physical therapy  3 times a week to address goals. Discharge Recommendations: Home Health  Further Equipment Recommendations for Discharge: Owns RW and SPC's per report     SUBJECTIVE:   Patient stated I feel better.     OBJECTIVE DATA SUMMARY:   HISTORY:    Past Medical History:   Diagnosis Date    Breast CA [address] [address]     Fluid retention in legs     Hypertension      Past Surgical History:   Procedure Laterality Date    BREAST SURGERY PROCEDURE UNLISTED      Left lumpectomy    HX ORTHOPAEDIC  2007    left hip replacement     Prior Level of Function/Home Situation: independent, caregiver for her   Personal factors and/or comorbidities impacting plan of care:     Home Situation  Home Environment: Private residence  Current DME Used/Available at Home: [employer], straight, Walker, rolling    EXAMINATION/PRESENTATION/DECISION MAKING:   Critical Behavior:  Neurologic State: Alert  Orientation Level: Oriented X4  Cognition: Appropriate for age attention/concentration     Hearing:   Auditory  Auditory Impairment: None  Skin:    Edema:   Range Of Motion:  AROM: Generally decreased, functional           PROM: Generally decreased, functional           Strength:    Strength: Generally decreased, functional                    Tone & Sensation:                                  Coordination:     Vision:      Functional Mobility:  Bed Mobility:     Supine to Sit: Independent        Transfers:  Sit to Stand: Supervision  Stand to Sit: Supervision  Stand Pivot Transfers: Supervision                    Balance:   Sitting: Intact  Standing: Impaired  Standing - Static: Good  Standing - Dynamic : Fair  Ambulation/Gait Training:  Distance (ft): 150 Feet (ft)  Assistive Device: Gait belt  Ambulation - Level of Assistance: Contact guard assistance        Gait Abnormalities: Altered arm swing;Decreased step clearance; Path deviations        Base of Support: Narrowed     Speed/Sonya: Pace decreased (<100 feet/min)  Step Length: Left shortened;Right shortened                     Stairs: Therapeutic Exercises:       Functional Measure:  Timed up and go:    Timed Get Up And Go Test: 13     Timed Up and Go and G-code impairment scale:  Percentage of Impairment CH    0%   CI    1-19% CJ    20-39% CK    40-59% CL    60-79% CM    80-99% CN     100%   Timed   Score 0-56 10 11-12 13-14 15-16 17-18 19 20       < than 10 seconds=Normal  Greater then 13.5 seconds (in elderly)=Increased fall risk   Kam ESPARZA, Giovani Bridges, Rajat PEÑA. Predicting the probability for falls in community dwelling older adults using the Timed Up and Go Test. Phys Ther. 2000;80:896-903. G codes: In compliance with CMSs Claims Based Outcome Reporting, the following G-code set was chosen for this patient based on their primary functional limitation being treated: The outcome measure chosen to determine the severity of the functional limitation was the TUG with a score of 13 seconds which was correlated with the impairment scale. ? Mobility - Walking and Moving Around:     - CURRENT STATUS: CJ - 20%-39% impaired, limited or restricted    - GOAL STATUS: CI - 1%-19% impaired, limited or restricted    - D/C STATUS:  ---------------To be determined---------------          Pain:  Pain Scale 1: Numeric (0 - 10)  Pain Intensity 1: 0              Activity Tolerance:   Improving  Please refer to the flowsheet for vital signs taken during this treatment.   After treatment:   [x] Patient left in no apparent distress sitting up in chair  []         Patient left in no apparent distress in bed  [x]         Call bell left within reach  [x]         Nursing notified  []         Caregiver present  [x]         Chair alarm activated    COMMUNICATION/EDUCATION:   The patients plan of care was discussed with: Registered Nurse. [x]         Fall prevention education was provided and the patient/caregiver indicated understanding. [x]         Patient/family have participated as able in goal setting and plan of care. [x]         Patient/family agree to work toward stated goals and plan of care. []         Patient understands intent and goals of therapy, but is neutral about his/her participation. []         Patient is unable to participate in goal setting and plan of care.     Thank you for this referral.  Keon Mujica, PT   Time Calculation: 10 mins

## 2018-07-21 ENCOUNTER — HOME HEALTH ADMISSION (OUTPATIENT)
Dept: HOME HEALTH SERVICES | Facility: HOME HEALTH | Age: 83
End: 2018-07-21
Payer: MEDICARE

## 2018-07-21 VITALS
OXYGEN SATURATION: 97 % | BODY MASS INDEX: 22.36 KG/M2 | SYSTOLIC BLOOD PRESSURE: 128 MMHG | WEIGHT: 139.11 LBS | DIASTOLIC BLOOD PRESSURE: 57 MMHG | TEMPERATURE: 98.3 F | HEART RATE: 84 BPM | RESPIRATION RATE: 15 BRPM | HEIGHT: 66 IN

## 2018-07-21 LAB
ANION GAP SERPL CALC-SCNC: 11 MMOL/L (ref 5–15)
BACTERIA SPEC CULT: ABNORMAL
BASOPHILS # BLD: 0 K/UL (ref 0–0.1)
BASOPHILS NFR BLD: 0 % (ref 0–1)
BUN SERPL-MCNC: 13 MG/DL (ref 6–20)
BUN/CREAT SERPL: 19 (ref 12–20)
CALCIUM SERPL-MCNC: 7.9 MG/DL (ref 8.5–10.1)
CC UR VC: ABNORMAL
CHLORIDE SERPL-SCNC: 107 MMOL/L (ref 97–108)
CO2 SERPL-SCNC: 18 MMOL/L (ref 21–32)
CREAT SERPL-MCNC: 0.69 MG/DL (ref 0.55–1.02)
DIFFERENTIAL METHOD BLD: ABNORMAL
EOSINOPHIL # BLD: 0 K/UL (ref 0–0.4)
EOSINOPHIL NFR BLD: 0 % (ref 0–7)
ERYTHROCYTE [DISTWIDTH] IN BLOOD BY AUTOMATED COUNT: 14 % (ref 11.5–14.5)
GLUCOSE SERPL-MCNC: 74 MG/DL (ref 65–100)
HCT VFR BLD AUTO: 30 % (ref 35–47)
HGB BLD-MCNC: 10 G/DL (ref 11.5–16)
IMM GRANULOCYTES # BLD: 0.1 K/UL (ref 0–0.04)
IMM GRANULOCYTES NFR BLD AUTO: 1 % (ref 0–0.5)
LYMPHOCYTES # BLD: 1.3 K/UL (ref 0.8–3.5)
LYMPHOCYTES NFR BLD: 10 % (ref 12–49)
MCH RBC QN AUTO: 33.9 PG (ref 26–34)
MCHC RBC AUTO-ENTMCNC: 33.3 G/DL (ref 30–36.5)
MCV RBC AUTO: 101.7 FL (ref 80–99)
MONOCYTES # BLD: 1.7 K/UL (ref 0–1)
MONOCYTES NFR BLD: 14 % (ref 5–13)
NEUTS SEG # BLD: 9.5 K/UL (ref 1.8–8)
NEUTS SEG NFR BLD: 75 % (ref 32–75)
NRBC # BLD: 0 K/UL (ref 0–0.01)
NRBC BLD-RTO: 0 PER 100 WBC
PLATELET # BLD AUTO: 102 K/UL (ref 150–400)
PMV BLD AUTO: 12.5 FL (ref 8.9–12.9)
POTASSIUM SERPL-SCNC: 3.8 MMOL/L (ref 3.5–5.1)
RBC # BLD AUTO: 2.95 M/UL (ref 3.8–5.2)
SERVICE CMNT-IMP: ABNORMAL
SODIUM SERPL-SCNC: 136 MMOL/L (ref 136–145)
WBC # BLD AUTO: 12.7 K/UL (ref 3.6–11)

## 2018-07-21 PROCEDURE — 80048 BASIC METABOLIC PNL TOTAL CA: CPT | Performed by: HOSPITALIST

## 2018-07-21 PROCEDURE — 74011250636 HC RX REV CODE- 250/636: Performed by: HOSPITALIST

## 2018-07-21 PROCEDURE — G8988 SELF CARE GOAL STATUS: HCPCS

## 2018-07-21 PROCEDURE — 74011250636 HC RX REV CODE- 250/636: Performed by: FAMILY MEDICINE

## 2018-07-21 PROCEDURE — 74011250637 HC RX REV CODE- 250/637: Performed by: HOSPITALIST

## 2018-07-21 PROCEDURE — 94760 N-INVAS EAR/PLS OXIMETRY 1: CPT

## 2018-07-21 PROCEDURE — 97165 OT EVAL LOW COMPLEX 30 MIN: CPT

## 2018-07-21 PROCEDURE — 97535 SELF CARE MNGMENT TRAINING: CPT

## 2018-07-21 PROCEDURE — 74011000258 HC RX REV CODE- 258: Performed by: HOSPITALIST

## 2018-07-21 PROCEDURE — G8987 SELF CARE CURRENT STATUS: HCPCS

## 2018-07-21 PROCEDURE — 36415 COLL VENOUS BLD VENIPUNCTURE: CPT | Performed by: HOSPITALIST

## 2018-07-21 PROCEDURE — 85025 COMPLETE CBC W/AUTO DIFF WBC: CPT | Performed by: HOSPITALIST

## 2018-07-21 PROCEDURE — 74011250637 HC RX REV CODE- 250/637: Performed by: FAMILY MEDICINE

## 2018-07-21 RX ORDER — AMOXICILLIN AND CLAVULANATE POTASSIUM 875; 125 MG/1; MG/1
1 TABLET, FILM COATED ORAL 2 TIMES DAILY
Qty: 10 TAB | Refills: 0 | Status: SHIPPED | OUTPATIENT
Start: 2018-07-21 | End: 2020-01-18

## 2018-07-21 RX ADMIN — AMPICILLIN SODIUM 1 G: 1 INJECTION, POWDER, FOR SOLUTION INTRAMUSCULAR; INTRAVENOUS at 12:43

## 2018-07-21 RX ADMIN — AMPICILLIN SODIUM 1 G: 1 INJECTION, POWDER, FOR SOLUTION INTRAMUSCULAR; INTRAVENOUS at 00:52

## 2018-07-21 RX ADMIN — AMPICILLIN SODIUM 1 G: 1 INJECTION, POWDER, FOR SOLUTION INTRAMUSCULAR; INTRAVENOUS at 07:10

## 2018-07-21 RX ADMIN — ESCITALOPRAM OXALATE 5 MG: 10 TABLET ORAL at 08:31

## 2018-07-21 RX ADMIN — LEVOTHYROXINE SODIUM 100 MCG: 100 TABLET ORAL at 07:11

## 2018-07-21 RX ADMIN — SODIUM CHLORIDE 75 ML/HR: 900 INJECTION, SOLUTION INTRAVENOUS at 00:52

## 2018-07-21 RX ADMIN — Medication 10 ML: at 07:12

## 2018-07-21 NOTE — PROGRESS NOTES
The home health order was noted. Cm met with patient and offered choice. The patient would like [facility] . A referral was sent via [facility] and they accepted the referral. Cm also put the home health company on the patients follow up section on the After Visit Summary. No other discharge needs identified at this time.

## 2018-07-21 NOTE — PROGRESS NOTES
Bedside and Verbal shift change report given to Sandria Curling ,RN (oncoming nurse) by Jeffery Traore RN (offgoing nurse). Report given with SBAR and Kardex.

## 2018-07-21 NOTE — PROGRESS NOTES
Bedside shift change report given to Júnior Field (oncoming nurse) by Tamir Fontaine (offgoing nurse). Report included the following information SBAR, Kardex, Intake/Output, MAR and Recent Results.

## 2018-07-21 NOTE — PROGRESS NOTES
Bedside and Verbal shift change report given to Warner Null (oncoming nurse) by Gene Cohen RN (offgoing nurse). Report included the following information SBAR, Kardex, ER Summary, Procedure Summary, Intake/Output, MAR and Recent Results.

## 2018-07-21 NOTE — PROGRESS NOTES
0700 antibiotic discovered as not having infused when hanging 1300 dose. Pharmacy was notified, and recommended that note be made and revision to time given be changed since dose was missed. Medication will be retimed.

## 2018-07-21 NOTE — PROGRESS NOTES
Pt family asking about Discharging pt home with [facility] as home health care. Consult to Care Management placed requesting this option. Still waiting on blood cultures to result.

## 2018-07-21 NOTE — PROGRESS NOTES
Problem: Self Care Deficits Care Plan (Adult)  Goal: *Acute Goals and Plan of Care (Insert Text)  Occupational Therapy Goals  Initiated 7/21/2018  1. Patient will perform bathing with modified independence within 7 day(s). 2.  Patient will perform tub transfer (mock PRN) with modified independence within 7 day(s). 3.  Patient will perform simple home management with supervision/set-up within 7 day(s). 4.  Patient will perform medication management scenarios 3/3 with modified independence within 7 day(s). 5.  Patient will utilize energy conservation techniques during functional activities with verbal cues within 7 day(s). Occupational Therapy EVALUATION  Patient: Neto Zapata (28 y.o. female)  Date: 7/21/2018  Primary Diagnosis: UTI (urinary tract infection)        Precautions:   Bed Alarm, Fall    ASSESSMENT :  Based on the objective data described below, the patient presents with independent to moderate A ADLs, independent to supervision functional mobility, and standing tolerance 5 mins. ADLs limited by standing tolerance, dynamic standing balance, generalized weakness, and STM loss; baseline ROM. Education provided on caregiver burnout prevention, increasing items/routines/ADLs into LTM for increased independence as STM decreases, and energy conservation. Patient plans on discharging home with aid and family A PRN for herself and her spouse. Recommend HHOT to increase independence and safety in the home. Recommend with nursing patient to complete as able in order to maintain strength, endurance and independence: ADLs with supervision/setup, OOB to chair 3x/day and mobilizing to the bathroom for toileting with 1 assist. Thank you for your assistance. Patient will benefit from skilled intervention to address the above impairments.   Patients rehabilitation potential is considered to be Good  Factors which may influence rehabilitation potential include:   [x]             None noted  [] Mental ability/status  []             Medical condition  []             Home/family situation and support systems  []             Safety awareness  []             Pain tolerance/management  []             Other:      PLAN :  Recommendations and Planned Interventions:  [x]               Self Care Training                  [x]        Therapeutic Activities  [x]               Functional Mobility Training    [x]        Cognitive Retraining  [x]               Therapeutic Exercises           [x]        Endurance Activities  [x]               Balance Training                   []        Neuromuscular Re-Education  []               Visual/Perceptual Training     [x]   Home Safety Training  [x]               Patient Education                 [x]        Family Training/Education  []               Other (comment):    Frequency/Duration: Patient will be followed by occupational therapy 3 times a week to address goals. Discharge Recommendations: Home Health  Further Equipment Recommendations for Discharge: none noted     SUBJECTIVE:   Patient stated I have to get home to take care of my . But really he needs to start taking care of himself.     OBJECTIVE DATA SUMMARY:   HISTORY:   Past Medical History:   Diagnosis Date    Breast CA ([facility] [address]     Fluid retention in legs     Hypertension      Past Surgical History:   Procedure Laterality Date    BREAST SURGERY PROCEDURE UNLISTED      Left lumpectomy    HX ORTHOPAEDIC  2007    left hip replacement       Prior Level of Function/Environment/Context: independent with all ADLs, standing to shower, driving during the day and during low traffic times. 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  Patient sleeps in another room but got up 3-4 times in the night to A spouse to and from the [facility] for toileting. Daughter-in-law comes weekly to complete patient's spouse medication into pill box dispenser. Patient recently has been weaning herself from spouse A. Aid completes bathing for spouse. Personal Interests and/or values: increasing her independence/decreasing spouse dependency on her  Expanded or extensive additional review of patient history:     Home Situation  Home Environment: Private residence  Living Alone: No  Support Systems: Spouse/Significant Other/Partner, Home care staff, Family member(s), Child(jennifer)  Patient Expects to be Discharged to[de-identified] Private residence  Current DME Used/Available at Home: [address], [place], straight, Walker, rolling, Tub transfer bench  Tub or Shower Type: Tub/Shower combination    Hand dominance: Right    EXAMINATION OF PERFORMANCE DEFICITS:  Cognitive/Behavioral Status:  Neurologic State: Alert  Orientation Level: Oriented X4  Cognition: Appropriate decision making; Appropriate for age attention/concentration; Appropriate safety awareness  Perception: Appears intact  Perseveration: No perseveration noted  Safety/Judgement: Awareness of environment;Good awareness of safety precautions    Skin: intact PIV    Edema: intact    Hearing: Auditory  Auditory Impairment: None    Vision/Perceptual:                           Acuity: Impaired near vision    Corrective Lenses: Reading glasses    Range of Motion:  Shoulder flexion 120*  AROM: Generally decreased, functional  PROM: Generally decreased, functional                      Strength:  -3/5 shoulder flexion  Strength: Generally decreased, functional                Coordination:     Fine Motor Skills-Upper: Left Intact; Right Intact    Gross Motor Skills-Upper: Left Intact; Right Intact    Tone & Sensation:                              Balance:  Sitting: Intact; Without support  Standing: Impaired; Without support  Standing - Static: Good  Standing - Dynamic : Fair (slight losses of balance, self corrects)    Functional Mobility and Transfers for ADLs:  Bed Mobility:  Supine to Sit: Independent    Transfers:  Sit to Stand: Supervision  Stand to Sit: Supervision  Toilet Transfer : Supervision (grab bar, raised height commode)  Tub Transfer: Total assistance (not safe at this time)    ADL Assessment:  Feeding: Independent    Oral Facial Hygiene/Grooming: Supervision (standing at sink)    Bathing: Moderate assistance (infer 2* endurance)    Upper Body Dressing: Supervision (pull over shirt, slight loss of balance gathering)    Lower Body Dressing: Supervision (elastic waist pants, socks, slight LOB gathering)    Toileting: Supervision (grab bar use)  Meal Preparation: Total assistance  Homemaking: Total assistance  Medication Management: Total assistance  Financial Management: Total assistance    ADL Intervention and task modifications:   Son stating patient and spouse need to think about moving to AL facility, patient adamantly refusing to leave her home, grass, and space. Stating can use aid services as needed. Provided patient with education on caregiver burnout prevention, tools to increase habits/routines into LTM as STM starts to decrease, and energy conservation techniques. Cognitive Retraining  Safety/Judgement: Awareness of environment;Good awareness of safety precautions    Therapeutic Exercise:     Functional Measure:  Barthel Index:    Bathin  Bladder: 10  Bowels: 10  Groomin  Dressin  Feeding: 10  Mobility: 10  Stairs: 0  Toilet Use: 5  Transfer (Bed to Chair and Back): 10  Total: 65       Barthel and G-code impairment scale:  Percentage of impairment CH  0% CI  1-19% CJ  20-39% CK  40-59% CL  60-79% CM  80-99% CN  100%   Barthel Score 0-100 100 99-80 79-60 59-40 20-39 1-19   0   Barthel Score 0-20 20 17-19 13-16 9-12 5-8 1-4 0      The Barthel ADL Index: Guidelines  1.  The index should be used as a record of what a patient does, not as a record of what a patient could do. 2. The main aim is to establish degree of independence from any help, physical or verbal, however minor and for whatever reason. 3. The need for supervision renders the patient not independent. 4. A patient's performance should be established using the best available evidence. Asking the patient, friends/relatives and nurses are the usual sources, but direct observation and common sense are also important. However direct testing is not needed. 5. Usually the patient's performance over the preceding 24-48 hours is important, but occasionally longer periods will be relevant. 6. Middle categories imply that the patient supplies over 50 per cent of the effort. 7. Use of aids to be independent is allowed. Dawood Troy., Barthel, DKenanW. (6520). Functional evaluation: the Barthel Index. [address] [facility] (14)2. Debora Paul marianne SAMIR Mathews, Hilda Pascual., Jovani Cantu., [place], [address] [place] [address] (1999). Measuring the change indisability after inpatient rehabilitation; comparison of the responsiveness of the Barthel Index and Functional [place] Measure. Journal of Neurology, Neurosurgery, and Psychiatry, 66(4), 453-135. Pauline Stevens, N.J.A, Yonatan Herron,  MARYLOU.MARIANA, & Aguilar Israel, M.A. (2004.) Assessment of post-stroke quality of life in cost-effectiveness studies: The usefulness of the Barthel Index and the EuroQoL-5D. Quality of Life Research, 13, 656-54         G codes: In compliance with CMSs Claims Based Outcome Reporting, the following G-code set was chosen for this patient based on their primary functional limitation being treated: The outcome measure chosen to determine the severity of the functional limitation was the Barthel Index with a score of 65/100 which was correlated with the impairment scale. ?  Self Care:     - CURRENT STATUS: CJ - 20%-39% impaired, limited or restricted    - GOAL STATUS: CH - 0% impaired, limited or restricted    - D/C STATUS:  ---------------To be determined---------------     Pain:  Pain Scale 1: Numeric (0 - 10)  Pain Intensity 1: 0              Activity Tolerance:   VSS  Please refer to the flowsheet for vital signs taken during this treatment. After treatment:   [x] Patient left in no apparent distress sitting up in chair  [] Patient left in no apparent distress in bed  [x] Call bell left within reach  [x] Nursing notified  [] Caregiver present  [x] Bed alarm activated    COMMUNICATION/EDUCATION:   The patients plan of care was discussed with: Registered Nurse. [x] Home safety education was provided and the patient/caregiver indicated understanding. [x] Patient/family have participated as able in goal setting and plan of care. [x] Patient/family agree to work toward stated goals and plan of care. [] Patient understands intent and goals of therapy, but is neutral about his/her participation. [] Patient is unable to participate in goal setting and plan of care. This patients plan of care is appropriate for delegation to [facility].     Thank you for this referral.  Lv Suarez  Time Calculation: 35 mins

## 2018-07-21 NOTE — DISCHARGE INSTRUCTIONS
Please bring this form with you to show your primary care provider at your follow-up appointment. Primary care provider:  Dr. Mario Espinoza MD    Discharging provider:  Latasha Crouch MD    You have been admitted to the hospital with the following diagnoses:  · UTI (urinary tract infection)    FOLLOW-UP CARE RECOMMENDATIONS:    APPOINTMENTS:  Follow-up Information     Follow up With Details Comments [address] [place] [address] Rd.  [address] [facility]    Mario Espinoza MD In 1 week Discharge follow up Jane recommended: none    PENDING TEST RESULTS:  At the time of your discharge the following test results are still pending: none  Please make sure you review these results with your outpatient follow-up provider(s). SYMPTOMS to watch for: chest pain, shortness of breath, fever, chills, nausea, vomiting, diarrhea, change in mentation, falling, weakness, bleeding. DIET/what to eat:  Regular Diet    ACTIVITY:  Activity as tolerated and PT/OT per Home Health    WOUND CARE: NONE    EQUIPMENT needed:  NONE      What to do if new or unexpected symptoms occur? If you experience any of the above symptoms (or should other concerns or questions arise after discharge) please call your primary care physician. Return to the emergency room if you cannot get hold of your doctor. · It is very important that you keep your follow-up appointment(s). · Please bring discharge papers, medication list (and/or medication bottles) to your follow-up appointments for review by your outpatient provider(s). · Please check the list of medications and be sure it includes every medication (even non-prescription medications) that your provider wants you to take. · It is important that you take the medication exactly as they are prescribed.    · Keep your medication in the bottles provided by the pharmacist and keep a list of the medication names, dosages, and times to be taken in your wallet. · Do not take other medications without consulting your doctor. · If you have any questions about your medications or other instructions, please talk to your nurse or care provider before you leave the hospital.    I understand that if any problems occur once I am at home I am to contact my physician. These instructions were explained to me and I had the opportunity to ask questions.

## 2018-07-21 NOTE — DISCHARGE SUMMARY
Discharge Summary     Patient:  Dorota Murcia       MRN: [id number]       YOB: 1935       Age: 81 y.o. Date of admission:  7/19/2018    Date of discharge:  7/21/2018    Primary care provider: Dr. Shaniqua Matias MD    Admitting provider:  Miri Whalen MD    Discharging provider:  Bong Da Silva MD - [phone]  If unavailable, call [phone] and ask the  to page the triage hospitalist.    Consultations  · None    Procedures  · * No surgery found *    Discharge destination: HOME with [facility]. The patient is stable for discharge. Admission diagnosis  · UTI (urinary tract infection)    Current Discharge Medication List      START taking these medications    Details   amoxicillin-clavulanate (AUGMENTIN) 875-125 mg per tablet Take 1 Tab by mouth two (2) times a day. Qty: 10 Tab, Refills: 0         CONTINUE these medications which have NOT CHANGED    Details   escitalopram oxalate (LEXAPRO) 5 mg tablet Take 5 mg by mouth daily. levothyroxine (SYNTHROID) 100 mcg tablet Take 100 mcg by mouth Daily (before breakfast). Follow-up Information     Follow up With Details Comments [address] [place] [address] Rd.  [address] [facility] Nancy Perera [address]    Shaniqua Matias MD In 1 week Discharge follow up [address] [place] [address]  [phone]            Final discharge diagnoses and brief hospital course  Please also refer to the admission H&P for details on the presenting problem. 82 yo female with PMHx of Depression, Hypothyroidism, admitted for weakness and confusion. Pt was recently treated for UTI with bactrim but had allergic reaction so called doctor who called in a different abx which patient unable to give name of. Pt's daughter in law noticed patient was confused and weak and unable to get out of bed.        Sepsis due to UTI  - UCx: Enterococcus faecalis D  - change levaquin to Ampicillin on 7/20  - discharge on Augmentin BID X 5 more days     Metabolic encephalopathy due to sepsis  - back to baseline  Per son     Weakness due to sepsis  - PT/OT: recommends home health PT/OT     Hyponatremia - resolved     Mild Hypokalemia POA , resolved     Dementia without behavioral disturbance  - mild dementia at baseline  - okay to go home with Home health at this time  - discussed future needs for HHA or [facility]    FOLLOW-UP TESTS recommended: none     PENDING TEST RESULTS:  At the time of your discharge the following test results are still pending: none  Please make sure you review these results with your outpatient follow-up provider(s).     SYMPTOMS to watch for: chest pain, shortness of breath, fever, chills, nausea, vomiting, diarrhea, change in mentation, falling, weakness, bleeding.     DIET/what to eat:  Regular Diet     ACTIVITY:  Activity as tolerated and PT/OT per Home Health     WOUND CARE: NONE     EQUIPMENT needed:  NONE    Physical examination at discharge  Visit Vitals    /64 (BP 1 Location: Left arm, BP Patient Position: At rest)    Pulse 69    Temp 97.8 °F (36.6 °C)    Resp 15    Ht 5' 6\" (1.676 m)    Wt 63.1 kg (139 lb 1.8 oz)    SpO2 96%    BMI 22.45 kg/m2     Ao3  PERRLA  EOMI  Lung: CTA  CVS: RRR  Abd: soft NT ND  Ext No edema    Pertinent imaging studies:    All Micro Results     Procedure Component Value Units Date/Time    CULTURE, URINE [[id number]]  (Abnormal)  (Susceptibility) Collected:  07/19/18 1551    Order Status:  Completed Specimen:  Urine from Clean catch Updated:  07/21/18 7346     Special Requests: NO SPECIAL REQUESTS        [place] Count --        >100,000  COLONIES/mL       Culture result:         ENTEROCOCCUS FAECALIS GROUP D (A)    CULTURE, BLOOD [[id number]] Collected:  07/19/18 1232    Order Status:  Completed Specimen:  Blood from Blood Updated:  07/21/18 9384     Special Requests: NO SPECIAL REQUESTS        Culture result: NO GROWTH 2 DAYS       CULTURE, BLOOD [[id number]] Collected:  07/19/18 1435    Order Status:  Completed Specimen:  Blood from Blood Updated:  07/21/18 0552     Special Requests: NO SPECIAL REQUESTS        Culture result: NO GROWTH 2 DAYS       CULTURE, URINE [[id number]]     Order Status:  Canceled Specimen:  Urine from Clean catch     URINE CULTURE HOLD SAMPLE [[id number]] Collected:  07/19/18 1551    Order Status:  Completed Specimen:  Urine from Serum Updated:  07/19/18 1602     Urine culture hold         URINE ON HOLD IN MICROBIOLOGY DEPT FOR 3 DAYS. IF UNPRESERVED URINE IS SUBMITTED, IT CANNOT BE USED FOR ADDITIONAL TESTING AFTER 24 HRS, RECOLLECTION WILL BE REQUIRED. Recent Labs      07/21/18   0249 07/20/18   0233  07/19/18   1232   WBC  12.7*  16.6*  16.3*   HGB  10.0*  11.0*  12.9   HCT  30.0*  32.1*  38.8   PLT  102*  130*  133*     Recent Labs      07/21/18   0249  07/20/18   0233  07/19/18   1232   NA  136  135*  136   K  3.8  3.9  3.3*   CL  107  105  99   CO2  18*  19*  26   BUN  13  14  18   CREA  0.69  0.85  1.06*   GLU  74  108*  110*   CA  7.9*  8.0*  8.6     Recent Labs      07/19/18   1232   SGOT  18   AP  60   TP  7.2   ALB  3.4*   GLOB  3.8     No results for input(s): INR, PTP, APTT in the last 72 hours. No lab exists for component: INREXT   No results for input(s): FE, TIBC, PSAT, FERR in the last 72 hours. No results for input(s): PH, PCO2, PO2 in the last 72 hours. No results for input(s): CPK, CKMB in the last 72 hours. No lab exists for component: TROPONINI  No components found for: [place]    Chronic Diagnoses:    Problem List as of 7/21/2018  Date Reviewed: 7/19/2018          Codes Class Noted - Resolved    * (Principal)UTI (urinary tract infection) ICD-10-CM: N39.0  ICD-9-CM: 599.0  7/19/2018 - Present              Time spent on discharge related activities today greater than 30 minutes.       Signed:  Anson Castro MD Hospitalist, Internal Medicine      Cc: Mario Espinoza MD

## 2018-07-23 ENCOUNTER — HOME CARE VISIT (OUTPATIENT)
Dept: SCHEDULING | Facility: HOME HEALTH | Age: 83
End: 2018-07-23
Payer: MEDICARE

## 2018-07-23 VITALS
RESPIRATION RATE: 16 BRPM | SYSTOLIC BLOOD PRESSURE: 133 MMHG | TEMPERATURE: 98.2 F | DIASTOLIC BLOOD PRESSURE: 75 MMHG | HEART RATE: 61 BPM | OXYGEN SATURATION: 97 %

## 2018-07-23 VITALS
TEMPERATURE: 98.3 F | HEART RATE: 70 BPM | SYSTOLIC BLOOD PRESSURE: 125 MMHG | RESPIRATION RATE: 18 BRPM | OXYGEN SATURATION: 98 % | DIASTOLIC BLOOD PRESSURE: 60 MMHG

## 2018-07-23 PROCEDURE — G0299 HHS/HOSPICE OF RN EA 15 MIN: HCPCS

## 2018-07-23 PROCEDURE — 3331090001 HH PPS REVENUE CREDIT

## 2018-07-23 PROCEDURE — 400013 HH SOC

## 2018-07-23 PROCEDURE — G0151 HHCP-SERV OF PT,EA 15 MIN: HCPCS

## 2018-07-23 PROCEDURE — 3331090002 HH PPS REVENUE DEBIT

## 2018-07-24 ENCOUNTER — HOME CARE VISIT (OUTPATIENT)
Dept: SCHEDULING | Facility: HOME HEALTH | Age: 83
End: 2018-07-24
Payer: MEDICARE

## 2018-07-24 PROCEDURE — 3331090002 HH PPS REVENUE DEBIT

## 2018-07-24 PROCEDURE — G0300 HHS/HOSPICE OF LPN EA 15 MIN: HCPCS

## 2018-07-24 PROCEDURE — 3331090001 HH PPS REVENUE CREDIT

## 2018-07-25 ENCOUNTER — HOME CARE VISIT (OUTPATIENT)
Dept: SCHEDULING | Facility: HOME HEALTH | Age: 83
End: 2018-07-25
Payer: MEDICARE

## 2018-07-25 VITALS
TEMPERATURE: 98.7 F | DIASTOLIC BLOOD PRESSURE: 60 MMHG | OXYGEN SATURATION: 95 % | RESPIRATION RATE: 18 BRPM | HEART RATE: 75 BPM | SYSTOLIC BLOOD PRESSURE: 110 MMHG

## 2018-07-25 VITALS
TEMPERATURE: 98.6 F | SYSTOLIC BLOOD PRESSURE: 130 MMHG | HEART RATE: 69 BPM | RESPIRATION RATE: 19 BRPM | OXYGEN SATURATION: 95 % | DIASTOLIC BLOOD PRESSURE: 60 MMHG

## 2018-07-25 LAB
BACTERIA SPEC CULT: NORMAL
BACTERIA SPEC CULT: NORMAL
SERVICE CMNT-IMP: NORMAL
SERVICE CMNT-IMP: NORMAL

## 2018-07-25 PROCEDURE — 3331090002 HH PPS REVENUE DEBIT

## 2018-07-25 PROCEDURE — 3331090001 HH PPS REVENUE CREDIT

## 2018-07-25 PROCEDURE — G0151 HHCP-SERV OF PT,EA 15 MIN: HCPCS

## 2018-07-26 ENCOUNTER — HOME CARE VISIT (OUTPATIENT)
Dept: SCHEDULING | Facility: HOME HEALTH | Age: 83
End: 2018-07-26
Payer: MEDICARE

## 2018-07-26 VITALS
SYSTOLIC BLOOD PRESSURE: 92 MMHG | RESPIRATION RATE: 16 BRPM | OXYGEN SATURATION: 98 % | DIASTOLIC BLOOD PRESSURE: 65 MMHG | TEMPERATURE: 98.9 F | HEART RATE: 69 BPM

## 2018-07-26 PROCEDURE — 3331090002 HH PPS REVENUE DEBIT

## 2018-07-26 PROCEDURE — G0300 HHS/HOSPICE OF LPN EA 15 MIN: HCPCS

## 2018-07-26 PROCEDURE — 3331090001 HH PPS REVENUE CREDIT

## 2018-07-26 PROCEDURE — G0152 HHCP-SERV OF OT,EA 15 MIN: HCPCS

## 2018-07-27 VITALS
RESPIRATION RATE: 18 BRPM | SYSTOLIC BLOOD PRESSURE: 125 MMHG | DIASTOLIC BLOOD PRESSURE: 55 MMHG | TEMPERATURE: 98.1 F | HEART RATE: 70 BPM | OXYGEN SATURATION: 95 %

## 2018-07-27 PROCEDURE — 3331090001 HH PPS REVENUE CREDIT

## 2018-07-27 PROCEDURE — 3331090002 HH PPS REVENUE DEBIT

## 2018-07-28 PROCEDURE — 3331090001 HH PPS REVENUE CREDIT

## 2018-07-28 PROCEDURE — 3331090002 HH PPS REVENUE DEBIT

## 2018-07-29 PROCEDURE — 3331090002 HH PPS REVENUE DEBIT

## 2018-07-29 PROCEDURE — 3331090001 HH PPS REVENUE CREDIT

## 2018-07-30 ENCOUNTER — HOME CARE VISIT (OUTPATIENT)
Dept: HOME HEALTH SERVICES | Facility: HOME HEALTH | Age: 83
End: 2018-07-30
Payer: MEDICARE

## 2018-07-30 PROCEDURE — 3331090001 HH PPS REVENUE CREDIT

## 2018-07-30 PROCEDURE — 3331090002 HH PPS REVENUE DEBIT

## 2018-07-31 ENCOUNTER — HOME CARE VISIT (OUTPATIENT)
Dept: SCHEDULING | Facility: HOME HEALTH | Age: 83
End: 2018-07-31
Payer: MEDICARE

## 2018-07-31 VITALS
HEART RATE: 78 BPM | RESPIRATION RATE: 18 BRPM | OXYGEN SATURATION: 98 % | DIASTOLIC BLOOD PRESSURE: 60 MMHG | SYSTOLIC BLOOD PRESSURE: 120 MMHG | TEMPERATURE: 98.5 F

## 2018-07-31 VITALS
SYSTOLIC BLOOD PRESSURE: 120 MMHG | OXYGEN SATURATION: 98 % | TEMPERATURE: 98.5 F | DIASTOLIC BLOOD PRESSURE: 60 MMHG | HEART RATE: 78 BPM | RESPIRATION RATE: 18 BRPM

## 2018-07-31 PROCEDURE — 3331090002 HH PPS REVENUE DEBIT

## 2018-07-31 PROCEDURE — G0300 HHS/HOSPICE OF LPN EA 15 MIN: HCPCS

## 2018-07-31 PROCEDURE — G0151 HHCP-SERV OF PT,EA 15 MIN: HCPCS

## 2018-07-31 PROCEDURE — 3331090001 HH PPS REVENUE CREDIT

## 2018-08-01 PROCEDURE — 3331090002 HH PPS REVENUE DEBIT

## 2018-08-01 PROCEDURE — 3331090001 HH PPS REVENUE CREDIT

## 2018-08-02 ENCOUNTER — HOME CARE VISIT (OUTPATIENT)
Dept: SCHEDULING | Facility: HOME HEALTH | Age: 83
End: 2018-08-02
Payer: MEDICARE

## 2018-08-02 VITALS
OXYGEN SATURATION: 98 % | TEMPERATURE: 97.8 F | DIASTOLIC BLOOD PRESSURE: 60 MMHG | HEART RATE: 94 BPM | SYSTOLIC BLOOD PRESSURE: 120 MMHG | RESPIRATION RATE: 18 BRPM

## 2018-08-02 PROCEDURE — 3331090001 HH PPS REVENUE CREDIT

## 2018-08-02 PROCEDURE — G0300 HHS/HOSPICE OF LPN EA 15 MIN: HCPCS

## 2018-08-02 PROCEDURE — 3331090002 HH PPS REVENUE DEBIT

## 2018-08-02 PROCEDURE — G0151 HHCP-SERV OF PT,EA 15 MIN: HCPCS

## 2018-08-03 PROCEDURE — 3331090001 HH PPS REVENUE CREDIT

## 2018-08-03 PROCEDURE — 3331090002 HH PPS REVENUE DEBIT

## 2018-08-04 PROCEDURE — 3331090002 HH PPS REVENUE DEBIT

## 2018-08-04 PROCEDURE — 3331090001 HH PPS REVENUE CREDIT

## 2018-08-05 VITALS
OXYGEN SATURATION: 97 % | RESPIRATION RATE: 18 BRPM | HEART RATE: 97 BPM | DIASTOLIC BLOOD PRESSURE: 70 MMHG | TEMPERATURE: 98.1 F | SYSTOLIC BLOOD PRESSURE: 126 MMHG

## 2018-08-05 PROCEDURE — 3331090001 HH PPS REVENUE CREDIT

## 2018-08-05 PROCEDURE — 3331090002 HH PPS REVENUE DEBIT

## 2018-08-06 ENCOUNTER — HOME CARE VISIT (OUTPATIENT)
Dept: SCHEDULING | Facility: HOME HEALTH | Age: 83
End: 2018-08-06
Payer: MEDICARE

## 2018-08-06 VITALS
SYSTOLIC BLOOD PRESSURE: 150 MMHG | HEART RATE: 77 BPM | DIASTOLIC BLOOD PRESSURE: 70 MMHG | OXYGEN SATURATION: 98 % | TEMPERATURE: 98.5 F | RESPIRATION RATE: 18 BRPM

## 2018-08-06 PROCEDURE — G0151 HHCP-SERV OF PT,EA 15 MIN: HCPCS

## 2018-08-06 PROCEDURE — 3331090001 HH PPS REVENUE CREDIT

## 2018-08-06 PROCEDURE — 3331090002 HH PPS REVENUE DEBIT

## 2018-08-07 PROCEDURE — 3331090001 HH PPS REVENUE CREDIT

## 2018-08-07 PROCEDURE — 3331090002 HH PPS REVENUE DEBIT

## 2018-08-08 PROCEDURE — 3331090002 HH PPS REVENUE DEBIT

## 2018-08-08 PROCEDURE — 3331090001 HH PPS REVENUE CREDIT

## 2018-08-09 ENCOUNTER — HOME CARE VISIT (OUTPATIENT)
Dept: SCHEDULING | Facility: HOME HEALTH | Age: 83
End: 2018-08-09
Payer: MEDICARE

## 2018-08-09 VITALS
TEMPERATURE: 99 F | DIASTOLIC BLOOD PRESSURE: 72 MMHG | OXYGEN SATURATION: 96 % | SYSTOLIC BLOOD PRESSURE: 140 MMHG | HEART RATE: 68 BPM

## 2018-08-09 PROCEDURE — G0299 HHS/HOSPICE OF RN EA 15 MIN: HCPCS

## 2018-08-09 PROCEDURE — 3331090001 HH PPS REVENUE CREDIT

## 2018-08-09 PROCEDURE — 3331090002 HH PPS REVENUE DEBIT

## 2018-08-10 PROCEDURE — 3331090002 HH PPS REVENUE DEBIT

## 2018-08-10 PROCEDURE — 3331090001 HH PPS REVENUE CREDIT

## 2018-08-11 PROCEDURE — 3331090001 HH PPS REVENUE CREDIT

## 2018-08-11 PROCEDURE — 3331090002 HH PPS REVENUE DEBIT

## 2018-08-12 PROCEDURE — 3331090002 HH PPS REVENUE DEBIT

## 2018-08-12 PROCEDURE — 3331090001 HH PPS REVENUE CREDIT

## 2018-08-13 PROCEDURE — 3331090002 HH PPS REVENUE DEBIT

## 2018-08-13 PROCEDURE — 3331090001 HH PPS REVENUE CREDIT

## 2018-11-12 ENCOUNTER — HOSPITAL ENCOUNTER (EMERGENCY)
Age: 83
Discharge: HOME | End: 2018-11-13
Attending: EMERGENCY MEDICINE
Payer: MEDICARE

## 2018-11-12 ENCOUNTER — APPOINTMENT (OUTPATIENT)
Dept: CT IMAGING | Age: 83
End: 2018-11-12
Attending: EMERGENCY MEDICINE
Payer: MEDICARE

## 2018-11-12 DIAGNOSIS — R11.10 HYPEREMESIS: Primary | ICD-10-CM

## 2018-11-12 DIAGNOSIS — E86.0 DEHYDRATION: ICD-10-CM

## 2018-11-12 DIAGNOSIS — N13.30 HYDRONEPHROSIS, UNSPECIFIED HYDRONEPHROSIS TYPE: ICD-10-CM

## 2018-11-12 LAB
ALBUMIN SERPL-MCNC: 3.7 G/DL (ref 3.5–5)
ALBUMIN/GLOB SERPL: 1 {RATIO} (ref 1.1–2.2)
ALP SERPL-CCNC: 62 U/L (ref 45–117)
ALT SERPL-CCNC: 23 U/L (ref 12–78)
AMYLASE SERPL-CCNC: 74 U/L (ref 25–115)
ANION GAP SERPL CALC-SCNC: 10 MMOL/L (ref 5–15)
APPEARANCE UR: ABNORMAL
AST SERPL-CCNC: 22 U/L (ref 15–37)
BACTERIA URNS QL MICRO: ABNORMAL /HPF
BASOPHILS # BLD: 0.1 K/UL (ref 0–0.1)
BASOPHILS NFR BLD: 1 % (ref 0–1)
BILIRUB SERPL-MCNC: 0.4 MG/DL (ref 0.2–1)
BILIRUB UR QL: NEGATIVE
BUN SERPL-MCNC: 27 MG/DL (ref 6–20)
BUN/CREAT SERPL: 26 (ref 12–20)
CALCIUM SERPL-MCNC: 9.2 MG/DL (ref 8.5–10.1)
CHLORIDE SERPL-SCNC: 105 MMOL/L (ref 97–108)
CO2 SERPL-SCNC: 25 MMOL/L (ref 21–32)
COLOR UR: ABNORMAL
COMMENT, HOLDF: NORMAL
CREAT SERPL-MCNC: 1.02 MG/DL (ref 0.55–1.02)
DIFFERENTIAL METHOD BLD: ABNORMAL
EOSINOPHIL # BLD: 0.2 K/UL (ref 0–0.4)
EOSINOPHIL NFR BLD: 2 % (ref 0–7)
EPITH CASTS URNS QL MICRO: ABNORMAL /LPF
ERYTHROCYTE [DISTWIDTH] IN BLOOD BY AUTOMATED COUNT: 13.7 % (ref 11.5–14.5)
GLOBULIN SER CALC-MCNC: 3.8 G/DL (ref 2–4)
GLUCOSE SERPL-MCNC: 139 MG/DL (ref 65–100)
GLUCOSE UR STRIP.AUTO-MCNC: NEGATIVE MG/DL
HCT VFR BLD AUTO: 41.4 % (ref 35–47)
HGB BLD-MCNC: 13.9 G/DL (ref 11.5–16)
HGB UR QL STRIP: ABNORMAL
IMM GRANULOCYTES # BLD: 0.1 K/UL (ref 0–0.04)
IMM GRANULOCYTES NFR BLD AUTO: 1 % (ref 0–0.5)
KETONES UR QL STRIP.AUTO: 15 MG/DL
LEUKOCYTE ESTERASE UR QL STRIP.AUTO: ABNORMAL
LIPASE SERPL-CCNC: 212 U/L (ref 73–393)
LYMPHOCYTES # BLD: 2.4 K/UL (ref 0.8–3.5)
LYMPHOCYTES NFR BLD: 30 % (ref 12–49)
MCH RBC QN AUTO: 33.8 PG (ref 26–34)
MCHC RBC AUTO-ENTMCNC: 33.6 G/DL (ref 30–36.5)
MCV RBC AUTO: 100.7 FL (ref 80–99)
MONOCYTES # BLD: 0.6 K/UL (ref 0–1)
MONOCYTES NFR BLD: 7 % (ref 5–13)
NEUTS SEG # BLD: 4.7 K/UL (ref 1.8–8)
NEUTS SEG NFR BLD: 59 % (ref 32–75)
NITRITE UR QL STRIP.AUTO: NEGATIVE
NRBC # BLD: 0 K/UL (ref 0–0.01)
NRBC BLD-RTO: 0 PER 100 WBC
PH UR STRIP: 7.5 [PH] (ref 5–8)
PLATELET # BLD AUTO: 181 K/UL (ref 150–400)
PMV BLD AUTO: 11.8 FL (ref 8.9–12.9)
POTASSIUM SERPL-SCNC: 4 MMOL/L (ref 3.5–5.1)
PROT SERPL-MCNC: 7.5 G/DL (ref 6.4–8.2)
PROT UR STRIP-MCNC: 30 MG/DL
RBC # BLD AUTO: 4.11 M/UL (ref 3.8–5.2)
RBC #/AREA URNS HPF: ABNORMAL /HPF
RBC MORPH BLD: ABNORMAL
SAMPLES BEING HELD,HOLD: NORMAL
SODIUM SERPL-SCNC: 140 MMOL/L (ref 136–145)
SP GR UR REFRACTOMETRY: 1.01 (ref 1–1.03)
UA: UC IF INDICATED,UAUC: ABNORMAL
UROBILINOGEN UR QL STRIP.AUTO: 0.2 EU/DL (ref 0.2–1)
WBC # BLD AUTO: 8.1 K/UL (ref 3.6–11)
WBC URNS QL MICRO: ABNORMAL /HPF

## 2018-11-12 PROCEDURE — 74177 CT ABD & PELVIS W/CONTRAST: CPT

## 2018-11-12 PROCEDURE — 99285 EMERGENCY DEPT VISIT HI MDM: CPT

## 2018-11-12 PROCEDURE — 74011250636 HC RX REV CODE- 250/636: Performed by: EMERGENCY MEDICINE

## 2018-11-12 PROCEDURE — 83690 ASSAY OF LIPASE: CPT

## 2018-11-12 PROCEDURE — 74011636320 HC RX REV CODE- 636/320: Performed by: INTERNAL MEDICINE

## 2018-11-12 PROCEDURE — 96374 THER/PROPH/DIAG INJ IV PUSH: CPT

## 2018-11-12 PROCEDURE — 80053 COMPREHEN METABOLIC PANEL: CPT

## 2018-11-12 PROCEDURE — 85025 COMPLETE CBC W/AUTO DIFF WBC: CPT

## 2018-11-12 PROCEDURE — 96361 HYDRATE IV INFUSION ADD-ON: CPT

## 2018-11-12 PROCEDURE — 81001 URINALYSIS AUTO W/SCOPE: CPT

## 2018-11-12 PROCEDURE — 74011000258 HC RX REV CODE- 258: Performed by: INTERNAL MEDICINE

## 2018-11-12 PROCEDURE — 82150 ASSAY OF AMYLASE: CPT

## 2018-11-12 PROCEDURE — 87186 SC STD MICRODIL/AGAR DIL: CPT

## 2018-11-12 PROCEDURE — 87086 URINE CULTURE/COLONY COUNT: CPT

## 2018-11-12 PROCEDURE — 87077 CULTURE AEROBIC IDENTIFY: CPT

## 2018-11-12 PROCEDURE — 96375 TX/PRO/DX INJ NEW DRUG ADDON: CPT

## 2018-11-12 RX ORDER — RIVASTIGMINE 4.6 MG/24H
1 PATCH, EXTENDED RELEASE TRANSDERMAL DAILY
COMMUNITY
End: 2020-01-18

## 2018-11-12 RX ORDER — KETOROLAC TROMETHAMINE 30 MG/ML
30 INJECTION, SOLUTION INTRAMUSCULAR; INTRAVENOUS
Status: COMPLETED | OUTPATIENT
Start: 2018-11-12 | End: 2018-11-12

## 2018-11-12 RX ORDER — SODIUM CHLORIDE 0.9 % (FLUSH) 0.9 %
10 SYRINGE (ML) INJECTION
Status: COMPLETED | OUTPATIENT
Start: 2018-11-12 | End: 2018-11-12

## 2018-11-12 RX ORDER — ONDANSETRON 2 MG/ML
4 INJECTION INTRAMUSCULAR; INTRAVENOUS
Status: COMPLETED | OUTPATIENT
Start: 2018-11-12 | End: 2018-11-12

## 2018-11-12 RX ADMIN — Medication 10 ML: at 23:02

## 2018-11-12 RX ADMIN — IOPAMIDOL 100 ML: 755 INJECTION, SOLUTION INTRAVENOUS at 23:02

## 2018-11-12 RX ADMIN — ONDANSETRON 4 MG: 2 INJECTION INTRAMUSCULAR; INTRAVENOUS at 22:00

## 2018-11-12 RX ADMIN — KETOROLAC TROMETHAMINE 30 MG: 30 INJECTION, SOLUTION INTRAMUSCULAR at 22:27

## 2018-11-12 RX ADMIN — SODIUM CHLORIDE 100 ML: 900 INJECTION, SOLUTION INTRAVENOUS at 23:02

## 2018-11-12 RX ADMIN — SODIUM CHLORIDE 1000 ML: 900 INJECTION, SOLUTION INTRAVENOUS at 22:26

## 2018-11-13 VITALS
SYSTOLIC BLOOD PRESSURE: 142 MMHG | HEART RATE: 84 BPM | WEIGHT: 136.47 LBS | RESPIRATION RATE: 16 BRPM | BODY MASS INDEX: 21.93 KG/M2 | DIASTOLIC BLOOD PRESSURE: 64 MMHG | TEMPERATURE: 97.7 F | HEIGHT: 66 IN | OXYGEN SATURATION: 95 %

## 2018-11-13 RX ORDER — ONDANSETRON 8 MG/1
8 TABLET, ORALLY DISINTEGRATING ORAL
Qty: 15 TAB | Refills: 0 | Status: SHIPPED | OUTPATIENT
Start: 2018-11-13 | End: 2020-01-18

## 2018-11-13 NOTE — ED NOTES
Dr. Lio Jett reviewed discharge instructions with the patient. The patient verbalized understanding. Patient was wheeled out of the emergency department by staff. Patient is free of nausea. Patient is in no apparent distress.

## 2018-11-13 NOTE — DISCHARGE INSTRUCTIONS
Dehydration: Care Instructions  Your Care Instructions  Dehydration happens when your body loses too much fluid. This might happen when you do not drink enough water or you lose large amounts of fluids from your body because of diarrhea, vomiting, or sweating. Severe dehydration can be life-threatening. Water and minerals called electrolytes help put your body fluids back in balance. Learn the early signs of fluid loss, and drink more fluids to prevent dehydration. Follow-up care is a key part of your treatment and safety. Be sure to make and go to all appointments, and call your doctor if you are having problems. It's also a good idea to know your test results and keep a list of the medicines you take. How can you care for yourself at home? · To prevent dehydration, drink plenty of fluids, enough so that your urine is light yellow or clear like water. Choose water and other caffeine-free clear liquids until you feel better. If you have kidney, heart, or liver disease and have to limit fluids, talk with your doctor before you increase the amount of fluids you drink. · If you do not feel like eating or drinking, try taking small sips of water, sports drinks, or other rehydration drinks. · Get plenty of rest.  To prevent dehydration  · Add more fluids to your diet and daily routine, unless your doctor has told you not to. · During hot weather, drink more fluids. Drink even more fluids if you exercise a lot. Stay away from drinks with alcohol or caffeine. · Watch for the symptoms of dehydration. These include:  ? A dry, sticky mouth. ? Dark yellow urine, and not much of it. ? Dry and sunken eyes. ? Feeling very tired. · Learn what problems can lead to dehydration. These include:  ? Diarrhea, fever, and vomiting. ? Any illness with a fever, such as pneumonia or the flu. ? Activities that cause heavy sweating, such as endurance races and heavy outdoor work in hot or humid weather. ?  Alcohol or drug abuse or withdrawal.  ? Certain medicines, such as cold and allergy pills (antihistamines), diet pills (diuretics), and laxatives. ? Certain diseases, such as diabetes, cancer, and heart or kidney disease. When should you call for help? Call 911 anytime you think you may need emergency care. For example, call if:    · You passed out (lost consciousness).    Call your doctor now or seek immediate medical care if:    · You are confused and cannot think clearly.     · You are dizzy or lightheaded, or you feel like you may faint.     · You have signs of needing more fluids. You have sunken eyes and a dry mouth, and you pass only a little dark urine.     · You cannot keep fluids down.    Watch closely for changes in your health, and be sure to contact your doctor if:    · You are not making tears.     · Your skin is very dry and sags slowly back into place after you pinch it.     · Your mouth and eyes are very dry. Where can you learn more? Go to http://velasquez-hollis.info/. Enter V091 in the search box to learn more about \"Dehydration: Care Instructions. \"  Current as of: November 20, 2017  Content Version: 11.8  © 9555-9119 [employer]. Care instructions adapted under license by [employer] (which disclaims liability or warranty for this information). If you have questions about a medical condition or this instruction, always ask your healthcare professional. [place] [address] any warranty or liability for your use of this information. Learning About Hydronephrosis  What is hydronephrosis? Hydronephrosis is swelling of the kidneys. It is caused by a buildup of urine. This condition can happen if a tube that drains urine from your kidneys is blocked. The blockage can come from within the urinary tract or from pressure outside of the tract. Pregnancy is an example of an outside (external) cause.   This condition is often caused by a blockage such as a kidney stone, tumor, or blood clot. It also can be caused by a problem in your urinary system that you were born with (congenital problem). What are the symptoms? Some of the common symptoms are:  · Pain in one or both sides. · Stomach pain. · Blood in your urine. Some people have no symptoms. How is it diagnosed? Your doctor will do an ultrasound to look for a blockage in your urinary system. An ultrasound allows your doctor to see a picture of the organs and other structures in your belly (abdomen). You also may need blood and urine tests. How is it treated? Your treatment depends on the cause of the swelling. If it is caused by a blockage, your treatment will depend on the type of blockage you have. If the blockage is caused by a kidney stone, you may wait for the stone to pass. If hydronephrosis happens during pregnancy, it usually clears up on its own. You may need to have urine drained from your bladder or kidneys. A urinary catheter is a small, flexible tube that can be inserted through the urethra and into the bladder, allowing urine to drain. A nephrostomy catheter is a thin tube placed into your kidney to drain urine. Sometimes surgery is needed to clear the blockage. If you have a blockage, you should begin to feel better after the blockage is gone. Many people recover and have no long-term problems. But some may have kidney damage. If hydronephrosis was left untreated for a long time, the damage can be severe. Severe damage will require further treatment. Follow-up care is a key part of your treatment and safety. Be sure to make and go to all appointments, and call your doctor if you are having problems. It's also a good idea to know your test results and keep a list of the medicines you take. Where can you learn more? Go to http://velasquez-hollis.info/. Enter S386 in the search box to learn more about \"Learning About Hydronephrosis. \"  Current as of: March 15, 2018  Content Version: 11.8  © 8673-2963 [employer]. Care instructions adapted under license by [employer] (which disclaims liability or warranty for this information). If you have questions about a medical condition or this instruction, always ask your healthcare professional. [address] any warranty or liability for your use of this information. Nausea and Vomiting: Care Instructions  Your Care Instructions    When you are nauseated, you may feel weak and sweaty and notice a lot of saliva in your mouth. Nausea often leads to vomiting. Most of the time you do not need to worry about nausea and vomiting, but they can be signs of other illnesses. Two common causes of nausea and vomiting are stomach flu and food poisoning. Nausea and vomiting from viral stomach flu will usually start to improve within 24 hours. Nausea and vomiting from food poisoning may last from 12 to 48 hours. The doctor has checked you carefully, but problems can develop later. If you notice any problems or new symptoms, get medical treatment right away. Follow-up care is a key part of your treatment and safety. Be sure to make and go to all appointments, and call your doctor if you are having problems. It's also a good idea to know your test results and keep a list of the medicines you take. How can you care for yourself at home? · To prevent dehydration, drink plenty of fluids, enough so that your urine is light yellow or clear like water. Choose water and other caffeine-free clear liquids until you feel better. If you have kidney, heart, or liver disease and have to limit fluids, talk with your doctor before you increase the amount of fluids you drink. · Rest in bed until you feel better. · When you are able to eat, try clear soups, mild foods, and liquids until all symptoms are gone for 12 to 48 hours.  Other good choices include dry toast, crackers, cooked cereal, and gelatin dessert, such as Kishor-AKASH. When should you call for help? Call 911 anytime you think you may need emergency care. For example, call if:    · You passed out (lost consciousness).    Call your doctor now or seek immediate medical care if:    · You have symptoms of dehydration, such as:  ? Dry eyes and a dry mouth. ? Passing only a little dark urine. ? Feeling thirstier than usual.     · You have new or worsening belly pain.     · You have a new or higher fever.     · You vomit blood or what looks like coffee grounds.    Watch closely for changes in your health, and be sure to contact your doctor if:    · You have ongoing nausea and vomiting.     · Your vomiting is getting worse.     · Your vomiting lasts longer than 2 days.     · You are not getting better as expected. Where can you learn more? Go to http://velasquez-hollis.info/. Enter 25 [id number] in the search box to learn more about \"Nausea and Vomiting: Care Instructions. \"  Current as of: November 20, 2017  Content Version: 11.8  © 6876-9369 [employer]. Care instructions adapted under license by "[employer]" (which disclaims liability or warranty for this information). If you have questions about a medical condition or this instruction, always ask your healthcare professional. [address] any warranty or liability for your use of this information.

## 2018-11-13 NOTE — ED PROVIDER NOTES
The patient is an 71-year-old female with a past medical history significant for breast cancer, hypertension, hypothyroidism, and chronic UTI, who presents to the ED with a complaint of sudden onset epigastric pain accompanied by nausea and vomiting that started approximately 2-3 hours prior to arrival to the ED. The patient also complains of lightheadedness, abdominal cramp, dizziness, decreased appetite and activity. Denies any fever, headache, back pain, chest pain, shortness of breath, diarrhea, constipation, dysuria, vaginal discharge or bleeding, dizziness, weakness, and numbness, sick contact at home, skin rash, unusual food sources, recent travel. Past Medical History:  
Diagnosis Date  Breast CA ([facility] [address]  Fluid retention in legs  Hypertension  Hypothyroidism Past Surgical History:  
Procedure Laterality Date  BREAST SURGERY PROCEDURE UNLISTED Left lumpectomy  HX ORTHOPAEDIC  2007  
 left hip replacement No family history on file. Social History Socioeconomic History  Marital status:  Spouse name: Not on file  Number of children: Not on file  Years of education: Not on file  Highest education level: Not on file Social Needs  Financial resource strain: Not on file  Food insecurity - worry: Not on file  Food insecurity - inability: Not on file  Transportation needs - medical: Not on file  Transportation needs - non-medical: Not on file Occupational History  Not on file Tobacco Use  Smoking status: Current Every Day Smoker  Smokeless tobacco: Never Used Substance and Sexual Activity  Alcohol use: Yes Alcohol/week: 0.0 oz  Drug use: No  
 Sexual activity: No  
Other Topics Concern  Not on file Social History Narrative  Not on file ALLERGIES: Sulfamethoxazole-trimethoprim; Darvocet a500 [propoxyphene n-acetaminophen];  Percocet [oxycodone-acetaminophen]; and Rocephin [ceftriaxone] Review of Systems All other systems reviewed and are negative. Vitals:  
 11/12/18 2230 11/12/18 2350 11/13/18 0000 11/13/18 0030 BP: 161/67 164/85 153/83 142/64 Pulse:      
Resp:    16 Temp:    97.7 °F (36.5 °C) SpO2: 96% 97% 96% 95% Weight:      
Height:      
      
 
Physical Exam  
Nursing note and vitals reviewed. CONSTITUTIONAL: Well-appearing; well-nourished; in no apparent distress HEAD: Normocephalic; atraumatic EYES: PERRL; EOM intact; conjunctiva and sclera are clear bilaterally. ENT: No rhinorrhea; normal pharynx with no tonsillar hypertrophy; mucous membranes pink/moist, no erythema, no exudate. NECK: Supple; non-tender; no cervical lymphadenopathy CARD: Normal S1, S2; no murmurs, rubs, or gallops. Regular rate and rhythm. RESP: Normal respiratory effort; breath sounds clear and equal bilaterally; no wheezes, rhonchi, or rales. ABD: Normal bowel sounds; non-distended; non-tender; no palpable organomegaly, no masses, no bruits. Back Exam: Normal inspection; no vertebral point tenderness, no CVA tenderness. Normal range of motion. EXT: Normal ROM in all four extremities; non-tender to palpation; no swelling or deformity; distal pulses are normal, no edema. SKIN: Warm; dry; no rash. NEURO:Alert and oriented x 3, coherent, MATILDA-XII grossly intact, sensory and motor are non-focal. 
 
 
 
MDM Number of Diagnoses or Management Options Dehydration:  
Hydronephrosis, unspecified hydronephrosis type: Hyperemesis:  
Diagnosis management comments: Assessment: gastritis/ dehydration/ rule out pancreatitis/bowel obstruction/ colitis. The patient appears hemodynamically stable Plan: lab/ IV fluid/ antiemetics and analgesia/ p.o. Challenge/ CT scan of the abdomen and pelvis/ Monitor and Reevaluate. Amount and/or Complexity of Data Reviewed Clinical lab tests: ordered and reviewed Tests in the radiology section of CPT®: ordered and reviewed Tests in the medicine section of CPT®: reviewed and ordered Discussion of test results with the performing providers: yes Decide to obtain previous medical records or to obtain history from someone other than the patient: yes Obtain history from someone other than the patient: yes Review and summarize past medical records: yes Discuss the patient with other providers: yes Independent visualization of images, tracings, or specimens: yes Risk of Complications, Morbidity, and/or Mortality Presenting problems: moderate Diagnostic procedures: moderate Management options: moderate Procedures Progress Note:  
Pt has been reexamined by Tarik Arcos MD. Pt is feeling much better. Symptoms have improved. All available results have been reviewed with pt and any available family. The patient was given p.o. Challenge, that she tolerated well. Pt understands sx, dx, and tx in ED. Care plan has been outlined and questions have been answered. Pt is ready to go home. Will send home on nausea and vomiting, and dehydration. Prescription of Zofran. Outpatient referral with PCP as needed. Written by Tarik Arcos MD,8:11 AM 
 
. Janett Perez

## 2018-11-14 LAB
BACTERIA SPEC CULT: ABNORMAL
CC UR VC: ABNORMAL
SERVICE CMNT-IMP: ABNORMAL

## 2018-11-14 NOTE — CALL BACK NOTE
[facility] Emergency Department Follow Up Call Record Discharged to : Home/Family Home/Home Health/Skilled Facility/Rehab/Assisted Living/Other_Home______ 1) Did you receive your discharge instructions? Yes This writer spoke with Man Saenz who reports feeling better today.  of patient verified. 2) Do you understand them? Yes        
3) Are you able to follow them? Yes If NO, what can I clarify for you? 4) Do you understand your diagnosis? Yes        
5) Do you know which symptoms should prompt you to call the doctor? Yes    
6) Were you able to fill and  any medications that were prescribed? Yes  
 
7) You were prescribed __zofran_________for __nausea__________________. Common side effects of this medication are____________________. This is not a complete list so please review the forms given from the pharmacy for a complete list. 8) Are there any questions about your medications? No     
 
  
 Have you scheduled any recommended doctors appointments (specialty, PCP) YES If NO, what barriers are you encountering (transportation/lost contact info/cost/ 
didnt think necessary/no PCP 
9) If discharged with Home Health, has the agency contacted you to schedule visit? Not applicable 10) Is there anyone available to help you at home (meals, errands, transportation   
monitoring) (adult children, neighbors, private duty companions) Not applicable 11) Are you on a special diet? No        
If YES, do you understand the requirements for this diet? Education provided? 12) If presented with cough, bronchitis, COPD, asthma, is it ok to ask that the 
 respiratory disease management educator call you? Not applicable 13)  A) If presented with fall, were you issued an assistive device in the ED Are you using? Not applicable B) If given RX for device, have you obtained? Not applicable If NO, barriers? C) Therapist recommended: Are you able to implement the suggestions? Not applicable If NO, barriers to implementation? D) Are you having any difficulties with mobility inside your home?   
 (steps, bed, tub)No 
 If YES, ask if the [facility] PT can contact patient and good time and number? 
14)  At the end of your discharge instructions, there is information about accessing [facility], have you had a chance to review those? Yes Do you have any questions about signing up for this service? We encourage our patients to be active participants in their healthcare and this site is one of the ways to do that. It will allow you to access parts of your medical record, email your doctors office, schedule appointments, and request medications refills . 15) Are there any other questions that I can answer for you regarding  
 your Emergency department visit? NO Estimated Call Time:_____2:04 PM 
______________ Date/Time:_______________

## 2018-11-15 RX ORDER — DOXYCYCLINE HYCLATE 100 MG
100 TABLET ORAL 2 TIMES DAILY
Qty: 14 TAB | Refills: 0 | Status: SHIPPED | OUTPATIENT
Start: 2018-11-15 | End: 2018-11-22

## 2018-11-15 NOTE — PROGRESS NOTES
Spoke with patient.  [facility] for doxycycline:1 pill bid x 7 days to [facility] three alejandrina and [facility]

## 2020-01-18 ENCOUNTER — APPOINTMENT (OUTPATIENT)
Dept: CT IMAGING | Age: 85
DRG: 064 | End: 2020-01-18
Attending: EMERGENCY MEDICINE
Payer: MEDICARE

## 2020-01-18 ENCOUNTER — HOSPITAL ENCOUNTER (INPATIENT)
Age: 85
LOS: 4 days | Discharge: NURSING, REHAB OR LONG-TERM CARE | DRG: 064 | End: 2020-01-22
Attending: EMERGENCY MEDICINE | Admitting: INTERNAL MEDICINE
Payer: MEDICARE
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
## 2020-01-18 DIAGNOSIS — R41.82 ALTERED MENTAL STATUS, UNSPECIFIED ALTERED MENTAL STATUS TYPE: ICD-10-CM

## 2020-01-18 DIAGNOSIS — S06.5XAA SUBDURAL HEMATOMA: Primary | ICD-10-CM

## 2020-01-18 DIAGNOSIS — I63.9 CEREBROVASCULAR ACCIDENT (CVA), UNSPECIFIED MECHANISM (HCC): ICD-10-CM

## 2020-01-18 DIAGNOSIS — I63.412 CEREBROVASCULAR ACCIDENT (CVA) DUE TO EMBOLISM OF LEFT MIDDLE CEREBRAL ARTERY (HCC): ICD-10-CM

## 2020-01-18 DIAGNOSIS — S09.90XA INJURY OF HEAD, INITIAL ENCOUNTER: ICD-10-CM

## 2020-01-18 LAB
ALBUMIN SERPL-MCNC: 3.3 G/DL (ref 3.5–5)
ALBUMIN/GLOB SERPL: 1 {RATIO} (ref 1.1–2.2)
ALP SERPL-CCNC: 54 U/L (ref 45–117)
ALT SERPL-CCNC: 18 U/L (ref 12–78)
ANION GAP SERPL CALC-SCNC: 3 MMOL/L (ref 5–15)
APPEARANCE UR: ABNORMAL
APTT PPP: 21.6 SEC (ref 22.1–32)
AST SERPL-CCNC: 36 U/L (ref 15–37)
ATRIAL RATE: 58 BPM
BACTERIA URNS QL MICRO: ABNORMAL /HPF
BASOPHILS # BLD: 0 K/UL (ref 0–0.1)
BASOPHILS NFR BLD: 1 % (ref 0–1)
BILIRUB SERPL-MCNC: 0.4 MG/DL (ref 0.2–1)
BILIRUB UR QL: NEGATIVE
BUN SERPL-MCNC: 25 MG/DL (ref 6–20)
BUN/CREAT SERPL: 22 (ref 12–20)
CALCIUM SERPL-MCNC: 8.5 MG/DL (ref 8.5–10.1)
CALCULATED P AXIS, ECG09: 73 DEGREES
CALCULATED R AXIS, ECG10: -36 DEGREES
CALCULATED T AXIS, ECG11: 87 DEGREES
CHLORIDE SERPL-SCNC: 106 MMOL/L (ref 97–108)
CO2 SERPL-SCNC: 27 MMOL/L (ref 21–32)
COLOR UR: ABNORMAL
COMMENT, HOLDF: NORMAL
CREAT SERPL-MCNC: 1.15 MG/DL (ref 0.55–1.02)
DIAGNOSIS, 93000: NORMAL
DIFFERENTIAL METHOD BLD: ABNORMAL
EOSINOPHIL # BLD: 0.5 K/UL (ref 0–0.4)
EOSINOPHIL NFR BLD: 6 % (ref 0–7)
EPITH CASTS URNS QL MICRO: ABNORMAL /LPF
ERYTHROCYTE [DISTWIDTH] IN BLOOD BY AUTOMATED COUNT: 14.2 % (ref 11.5–14.5)
GLOBULIN SER CALC-MCNC: 3.2 G/DL (ref 2–4)
GLUCOSE SERPL-MCNC: 98 MG/DL (ref 65–100)
GLUCOSE UR STRIP.AUTO-MCNC: NEGATIVE MG/DL
HCT VFR BLD AUTO: 36.4 % (ref 35–47)
HGB BLD-MCNC: 11.6 G/DL (ref 11.5–16)
HGB UR QL STRIP: ABNORMAL
HYALINE CASTS URNS QL MICRO: ABNORMAL /LPF (ref 0–5)
IMM GRANULOCYTES # BLD AUTO: 0 K/UL (ref 0–0.04)
IMM GRANULOCYTES NFR BLD AUTO: 0 % (ref 0–0.5)
INR PPP: 1 (ref 0.9–1.1)
KETONES UR QL STRIP.AUTO: NEGATIVE MG/DL
LEUKOCYTE ESTERASE UR QL STRIP.AUTO: ABNORMAL
LYMPHOCYTES # BLD: 2 K/UL (ref 0.8–3.5)
LYMPHOCYTES NFR BLD: 27 % (ref 12–49)
MCH RBC QN AUTO: 34 PG (ref 26–34)
MCHC RBC AUTO-ENTMCNC: 31.9 G/DL (ref 30–36.5)
MCV RBC AUTO: 106.7 FL (ref 80–99)
MONOCYTES # BLD: 0.8 K/UL (ref 0–1)
MONOCYTES NFR BLD: 10 % (ref 5–13)
NEUTS SEG # BLD: 4.2 K/UL (ref 1.8–8)
NEUTS SEG NFR BLD: 56 % (ref 32–75)
NITRITE UR QL STRIP.AUTO: NEGATIVE
NRBC # BLD: 0 K/UL (ref 0–0.01)
NRBC BLD-RTO: 0 PER 100 WBC
P-R INTERVAL, ECG05: 182 MS
PH UR STRIP: 7 [PH] (ref 5–8)
PLATELET # BLD AUTO: 128 K/UL (ref 150–400)
PMV BLD AUTO: 13 FL (ref 8.9–12.9)
POTASSIUM SERPL-SCNC: 4.6 MMOL/L (ref 3.5–5.1)
PROT SERPL-MCNC: 6.5 G/DL (ref 6.4–8.2)
PROT UR STRIP-MCNC: ABNORMAL MG/DL
PROTHROMBIN TIME: 10.5 SEC (ref 9–11.1)
Q-T INTERVAL, ECG07: 518 MS
QRS DURATION, ECG06: 144 MS
QTC CALCULATION (BEZET), ECG08: 508 MS
RBC # BLD AUTO: 3.41 M/UL (ref 3.8–5.2)
RBC #/AREA URNS HPF: ABNORMAL /HPF (ref 0–5)
SAMPLES BEING HELD,HOLD: NORMAL
SODIUM SERPL-SCNC: 136 MMOL/L (ref 136–145)
SP GR UR REFRACTOMETRY: 1.01
THERAPEUTIC RANGE,PTTT: ABNORMAL SECS (ref 58–77)
TROPONIN I SERPL-MCNC: <0.05 NG/ML
UR CULT HOLD, URHOLD: NORMAL
UROBILINOGEN UR QL STRIP.AUTO: 0.2 EU/DL (ref 0.2–1)
VENTRICULAR RATE, ECG03: 58 BPM
WBC # BLD AUTO: 7.5 K/UL (ref 3.6–11)
WBC URNS QL MICRO: >100 /HPF (ref 0–4)

## 2020-01-18 PROCEDURE — 77030040830 HC CATH URETH FOL MDII -A

## 2020-01-18 PROCEDURE — 65610000006 HC RM INTENSIVE CARE

## 2020-01-18 PROCEDURE — 84484 ASSAY OF TROPONIN QUANT: CPT

## 2020-01-18 PROCEDURE — 87086 URINE CULTURE/COLONY COUNT: CPT

## 2020-01-18 PROCEDURE — 99285 EMERGENCY DEPT VISIT HI MDM: CPT

## 2020-01-18 PROCEDURE — 70450 CT HEAD/BRAIN W/O DYE: CPT

## 2020-01-18 PROCEDURE — 74011636320 HC RX REV CODE- 636/320: Performed by: RADIOLOGY

## 2020-01-18 PROCEDURE — 85610 PROTHROMBIN TIME: CPT

## 2020-01-18 PROCEDURE — 74011250636 HC RX REV CODE- 250/636: Performed by: NURSE PRACTITIONER

## 2020-01-18 PROCEDURE — 74011000258 HC RX REV CODE- 258: Performed by: RADIOLOGY

## 2020-01-18 PROCEDURE — 74011250636 HC RX REV CODE- 250/636: Performed by: EMERGENCY MEDICINE

## 2020-01-18 PROCEDURE — 80053 COMPREHEN METABOLIC PANEL: CPT

## 2020-01-18 PROCEDURE — 51702 INSERT TEMP BLADDER CATH: CPT

## 2020-01-18 PROCEDURE — 93005 ELECTROCARDIOGRAM TRACING: CPT

## 2020-01-18 PROCEDURE — 85025 COMPLETE CBC W/AUTO DIFF WBC: CPT

## 2020-01-18 PROCEDURE — 81001 URINALYSIS AUTO W/SCOPE: CPT

## 2020-01-18 PROCEDURE — 85730 THROMBOPLASTIN TIME PARTIAL: CPT

## 2020-01-18 PROCEDURE — 87186 SC STD MICRODIL/AGAR DIL: CPT

## 2020-01-18 PROCEDURE — 0042T CT CODE NEURO PERF W CBF: CPT

## 2020-01-18 PROCEDURE — 74011250637 HC RX REV CODE- 250/637: Performed by: NURSE PRACTITIONER

## 2020-01-18 PROCEDURE — 36415 COLL VENOUS BLD VENIPUNCTURE: CPT

## 2020-01-18 PROCEDURE — 70496 CT ANGIOGRAPHY HEAD: CPT

## 2020-01-18 PROCEDURE — 87077 CULTURE AEROBIC IDENTIFY: CPT

## 2020-01-18 RX ORDER — LEVOTHYROXINE SODIUM 50 UG/1
50 TABLET ORAL
COMMUNITY
End: 2021-01-06 | Stop reason: SDUPTHER

## 2020-01-18 RX ORDER — ASPIRIN 300 MG/1
300 SUPPOSITORY RECTAL DAILY
Status: DISCONTINUED | OUTPATIENT
Start: 2020-01-18 | End: 2020-01-20

## 2020-01-18 RX ORDER — SODIUM CHLORIDE 9 MG/ML
75 INJECTION, SOLUTION INTRAVENOUS CONTINUOUS
Status: DISCONTINUED | OUTPATIENT
Start: 2020-01-18 | End: 2020-01-18

## 2020-01-18 RX ORDER — SODIUM CHLORIDE 0.9 % (FLUSH) 0.9 %
10 SYRINGE (ML) INJECTION
Status: COMPLETED | OUTPATIENT
Start: 2020-01-18 | End: 2020-01-18

## 2020-01-18 RX ORDER — DONEPEZIL HYDROCHLORIDE 5 MG/1
5 TABLET, FILM COATED ORAL
COMMUNITY

## 2020-01-18 RX ORDER — MEMANTINE HYDROCHLORIDE 28 MG/1
28 CAPSULE, EXTENDED RELEASE ORAL DAILY
COMMUNITY

## 2020-01-18 RX ADMIN — SODIUM CHLORIDE 1000 ML: 900 INJECTION, SOLUTION INTRAVENOUS at 11:46

## 2020-01-18 RX ADMIN — IOPAMIDOL 120 ML: 755 INJECTION, SOLUTION INTRAVENOUS at 10:24

## 2020-01-18 RX ADMIN — PHENYLEPHRINE HYDROCHLORIDE 30 MCG/MIN: 10 INJECTION INTRAVENOUS at 13:20

## 2020-01-18 RX ADMIN — ASPIRIN 300 MG: 300 SUPPOSITORY RECTAL at 15:12

## 2020-01-18 RX ADMIN — SODIUM CHLORIDE 100 ML: 900 INJECTION, SOLUTION INTRAVENOUS at 10:24

## 2020-01-18 RX ADMIN — Medication 10 ML: at 10:24

## 2020-01-18 NOTE — PROGRESS NOTES
INTERVENTIONAL NEURORADIOLOGY CODE STROKE NOTE           Neurointerventional Surgery Progress Note    Patient: Tan Ingram MRN: [id number]  SSN: [id number]    YOB: 1935  Age: 81 y.o. Sex: female      Admit Date: 2020    LOS: 0 days     Subjective:     82 yo female living independently with  last know well 20 @ 1045.  heard patient fall this morning around 9 am and called EMS. Pt brought to [facility] ED and stroke code called. Asked to evaluate pt for possible thrombectomy. Objective:     Patient Vitals for the past 24 hrs:   Temp Pulse Resp BP SpO2   20 1215 97.6 °F (36.4 °C) 61 17 141/62 99 %   20 1200  (!) 59 17 156/71 99 %   20 1139  60 14 136/59 100 %   20 1115  62 12 154/73 100 %   20 1100  (!) 58 15 149/64 99 %   20 1045  60 13 136/71 99 %   20 1032  70 13 156/66 99 %   20 1029 97.5 °F (36.4 °C) 62 15 152/62 99 %        Intake and Output:  Current Shift:  0701 -  1900  In: 100 [I.V.:100]  Out: -   Last three shifts: No intake/output data recorded. Neurological Exam:   AF VSS; NAD. Left gaze preference; head turned to left   strength:  Strong on left, mildly weak on right  Leg strength; strong on left, mildly weak on right  Pt answers \"ok\" to most questions; cannot identify where she is, date or situation; does not say name  1a-LOC:0    1b-Month/Age:2    1c-Open/Close Hand:1    2-Best Gaze:2    3-Visual Fields:0    4-Facial Palsy:2    5a-Left Arm:0    5b-Right Arm:0    6a-Left Le    6b-Right Le    7-Limb Ataxia:0    8-Sensory:0    9-Best Language:2    10-Dysarthria:0    11-Extinction/Inattention:1  TOTAL SCORE:11        Lab/Data Review: All lab results for the last 24 hours reviewed.      Imaging Reviewed:   I personally reviewed the patient's imaging  CT head shows a left MCA dot sign in the sylvian fissure c/w thrombus      Perfusion study shows infarcted left MCA  territory distal to thrombus; Perfusion defect also noted in WALDEMAR territory corresponding with a small area of ischemia with small thrombus noted in WALDEMAR pericallosal branch; filling distal to both emboli noted   Mismatch vol = 13.8; mismatch ratio = 1.7; both below lower limits for salvagable tissue        Assessment:     Active Problems:    Stroke (cerebrum) ([facility] [address] (1/18/2020)        Plan:     1.   Acute ischemic stroke left hemisphere secondary to small emboli in left MCA frontal branch and left pericallosal artery  -left MCA territory with completed infarction; amount of ischemic tissue at risk small; thrombectomy not indicated at this time  -will be admitted to ICU for further care by intensivist and neurology services    Signed By: Lord Ivet MD     January 18, 2020

## 2020-01-18 NOTE — PROGRESS NOTES
TRANSFER - IN REPORT:    Verbal report received from Lavon Mack RN(name) on Ashok Martin  being received from ER(unit) for routine progression of care      Report consisted of patients Situation, Background, Assessment and   Recommendations(SBAR). Information from the following report(s) SBAR, Kardex, Intake/Output, MAR, Recent Results, Cardiac Rhythm SB, Alarm Parameters  and Dual Neuro Assessment was reviewed with the receiving nurse. Opportunity for questions and clarification was provided. Assessment completed upon patients arrival to unit and care assumed. 1300: Primary Nurse Alphonse Parikh and Elizabeth Barton RN performed a dual skin assessment on this patient No impairment noted  Torsten score is 12    1930: Bedside and Verbal shift change report given to TORY Albrecht (oncoming nurse) by Carmen Mckeon RN (offgoing nurse). Report included the following information SBAR, Kardex, Intake/Output, MAR, Recent Results, Cardiac Rhythm SB-SR with BBB, Alarm Parameters  and Dual Neuro Assessment.

## 2020-01-18 NOTE — ED PROVIDER NOTES
81 y.o. female with past medical history significant for dementia, HTN, hypothyroidism, and fluid retention in both legs who presents from home via EMS with chief complaint of altered mental status. Per EMS, the pt's  says he heard her fall in another room around 9 AM and was unable to get her up so he called EMS. EMS says the pt has a left sided droop and left sided weakness that is unusual to her usual baseline. Pt's blood pressure was (160/75) en route with EMS. There are no other acute medical concerns at this time. Social hx: Occasional tobacco use. PCP: Macario Hernandez MD      Full history, physical exam, and ROS unable to be obtained due to:  Dementia. Note written by Kathy Ennis, as dictated by Chelsi Rocha MD 9:47 AM          The history is provided by the EMS personnel. No  was used. Past Medical History:   Diagnosis Date    Breast CA ([address]     Fluid retention in legs     Hypertension     Hypothyroidism        Past Surgical History:   Procedure Laterality Date    BREAST SURGERY PROCEDURE UNLISTED      Left lumpectomy    HX ORTHOPAEDIC  2007    left hip replacement         No family history on file. Social History     Socioeconomic History    Marital status:      Spouse name: Not on file    Number of children: Not on file    Years of education: Not on file    Highest education level: Not on file   Occupational History    Not on file   Social Needs    Financial resource strain: Not on file    Food insecurity:     Worry: Not on file     Inability: Not on file    Transportation needs:     Medical: Not on file     Non-medical: Not on file   Tobacco Use    Smoking status: Current Every Day Smoker    Smokeless tobacco: Never Used   Substance and Sexual Activity    Alcohol use:  Yes     Alcohol/week: 0.0 standard drinks    Drug use: No    Sexual activity: Never   Lifestyle    Physical activity:     Days per week: Not on file Minutes per session: Not on file    Stress: Not on file   Relationships    Social connections:     Talks on phone: Not on file     Gets together: Not on file     Attends [employer] service: Not on file     Active member of club or organization: Not on file     Attends meetings of clubs or organizations: Not on file     Relationship status: Not on file    Intimate partner violence:     Fear of current or ex partner: Not on file     Emotionally abused: Not on file     Physically abused: Not on file     Forced sexual activity: Not on file   Other Topics Concern    Not on file   Social History Narrative    Not on file         ALLERGIES: Sulfamethoxazole-trimethoprim; Darvocet a500 [propoxyphene n-acetaminophen]; Percocet [oxycodone-acetaminophen]; and Rocephin [ceftriaxone]    Review of Systems   Unable to perform ROS: Dementia       Vitals:    01/18/20 1029 01/18/20 1032   BP: (P) 152/62 156/66   Pulse: (P) 62 70   Resp: (P) 15 13   Temp: (P) 97.5 °F (36.4 °C)    SpO2: (P) 99% 99%   Weight: 69 kg (152 lb 1.9 oz)    Height: 5' 6\" (1.676 m)             Physical Exam     MDM  Number of Diagnoses or Management Options  Altered mental status, unspecified altered mental status type:   Cerebrovascular accident (CVA), unspecified mechanism ([address]):   Fall, initial encounter:   Injury of head, initial encounter:   Subdural hematoma ([facility]):          Procedures    CONSULT NOTE:  9:50 AM Chelsi Rocha MD spoke with Dr. Wily Rodríguez MD. Consult for tele-neurology. Discussed available diagnostic tests and clinical findings. Dr. Wily Rodríguez agrees with advanced imaging and will see pt in stroke bed afterwards. PROGRESS NOTE:  10:00 AM  Spoke with radiology who reported SDH on non-contrast HCT. CONSULT NOTE:  10:43 AM Chelsi Rocha MD spoke with Dr. Wily Rodríguez MD. Consult for tele-neurology. Discussed available diagnostic tests and clinical findings. Dr. Wily Rodríguez voiced concern of possible left MCA infarct.      PROGRESS NOTE:  10:46 AM  Spoke with radiology who says there is a delay in acquiring CTA due to an IV infiltration. Attempting to reach Neurointerventional Surgery. Hospitalist Debra Serve for Admission  10:53 AM    ED Room Number: ER04/04  Patient Name and age:  Ayden Godinez 81 y.o.  female  Working Diagnosis:   1. Subdural hematoma ([facility]    2. Injury of head, initial encounter    3. Fall, initial encounter    4. Altered mental status, unspecified altered mental status type    5. Cerebrovascular accident (CVA), unspecified mechanism ([address]      Readmission: no  Isolation Requirements:  no  Recommended Level of Care:  ICU  Code Status:  Full Code  Department:[facility] Adult ED - 21   Other:  L MCA infarct. No tpa. NeuroInterventional evaluating patient now. PROGRESS NOTE:  10:56 AM  Spoke with neurointerventionalist Dr. Tunde Harrison who reviewed the pt's images. PROGRESS NOTE:  11:02 AM  Spoke with neurointerventionalist Dr. Briana Hodge who will review the pt's images and see if she is a candidate for neurointervention. 11:39 AM  Discussed with Dr. Evelyn Shetty (ICU) who will admit. 11:39 AM  I have spent *36** minutes of critical care time involved in lab review, consultations with specialist, family decision-making, and documentation. During this entire length of time I was immediately available to the patient and/or family. l

## 2020-01-18 NOTE — ED TRIAGE NOTES
Pt arrives via EMS from home with AMS and gazing to the left.  reports hearing her fall but time is unknown. When asked if pt has a headache, pt nodded yes. Glucose 88 mg/dl per EMS. 's.

## 2020-01-18 NOTE — CONSULTS
Neurology Consult  Corinne Finner, NP  Neurocritical Care Nurse Practitioner  [phone]    Patient: Neto Zapata MRN: [id number]  SSN: [id number]    YOB: 1935  Age: 81 y.o. Sex: female      Chief Complaint: Aphasia, right-sided facial droop    Subjective:      Neto Zapata is a 81 y.o. female with history of smoking, hyperlipidemia, hypothyroidism, dementia and HTN was brought to the ER this morning after her  heard her fall at around 0900. She was last seen well last night  at 20:45. On arrival to the ER she had a head CT that showed an acute thin right parafalcine and tentorial subdural hemorrhage with small right occipital right scalp swelling and hematoma. Her CTA/CTP showed a perfusion deficit in the posterior frontal lobe corresponding with infarct. There was a perfusion abnormality in the left WALDEMAR territory and a left M2 occlusion as well as left WALDEMAR occlusion. She was not a candidate for TPA. Dr. Daniel Rodriguez with Neurointerventional Surgery was consulted for possible intervention. However, upon review of her imaging found that thrombectomy was not indicated at this time. She recommended admission to the ICU and for patient to be followed by Intensivist and Neurology. Past Medical History:   Diagnosis Date    Breast CA ([address]     Fluid retention in legs     Hypertension     Hypothyroidism      History reviewed. No pertinent family history. Social History     Tobacco Use    Smoking status: Current Every Day Smoker    Smokeless tobacco: Never Used   Substance Use Topics    Alcohol use: Yes     Alcohol/week: 0.0 standard drinks      Prior to Admission Medications   Prescriptions Last Dose Informant Patient Reported? Taking? OTHER 2020 at Unknown time  Yes Yes   Si Tab daily. Swiss Zuly Herbal Laxative: Take 1 tablet by mouth daily   Indications: constipation   cranberry 500 mg capsule 2020 at Unknown time  Yes Yes   Sig: Take 500 mg by mouth daily. donepeziL (ARICEPT) 5 mg tablet 1/17/2020 at Unknown time  Yes Yes   Sig: Take 5 mg by mouth nightly. escitalopram oxalate (LEXAPRO) 20 mg tablet 1/17/2020 at Unknown time  Yes Yes   Sig: Take 20 mg by mouth daily. levothyroxine (SYNTHROID) 100 mcg tablet 1/17/2020 at Unknown time  Yes Yes   Sig: Take 100 mcg by mouth six (6) days a week. Take 100 mcg by mouth Monday through Saturday. Take half-tablet (50 mcg) by mouth on Sunday   levothyroxine (SYNTHROID) 50 mcg tablet 1/12/2020  Yes Yes   Sig: Take 50 mcg by mouth every Sunday. Take 100 mcg by mouth Monday through Saturday. Take half-tablet (50 mcg) by mouth on Sunday   memantine ER (NAMENDA XR) 28 mg capsule 1/17/2020 at Unknown time  Yes Yes   Sig: Take 28 mg by mouth daily. multivit-min/iron/folic/lutein (CENTRUM SILVER WOMEN PO) 1/17/2020 at Unknown time  Yes Yes   Sig: Take 1,500 mg by mouth daily. Facility-Administered Medications: None       Allergies   Allergen Reactions    Sulfamethoxazole-Trimethoprim Hives     Treated with diphenhydramine    Darvocet A500 [Propoxyphene N-Acetaminophen] Itching    Percocet [Oxycodone-Acetaminophen] Itching    Rocephin [Ceftriaxone] Swelling     Questionable allergy, had facial swelling morning after IV administration       Review of Systems:  Pertinent items are noted in the History of Present Illness. Objective:     Vitals:    01/18/20 1256 01/18/20 1300 01/18/20 1330 01/18/20 1400   BP: 144/55 136/53 159/67 169/63   Pulse: 100 62 64 72   Resp: 27 9 11 20   Temp: 98.2 °F (36.8 °C)      SpO2: 93% 97% 97% 100%   Weight:       Height:          Physical Exam:  GENERAL: Calm, cooperative, NAD  EYES: Left gaze preference. Pupils 2+. PERRL  HEART: RRR  LUNGS: CTA. No dyspnea. SKIN: Warm, dry, color appropriate for ethnicity. No peripheral edema. Neurologic Exam:  Mental Status:  Alert confused. Follows some commands but cannot follow all due to lack of understanding.        Language:    Receptive and expressive aphasia. Can only say okay. Cranial Nerves:   Pupils 2 mm, equal, round and reactive to light. Blinks to visual threat. Left gaze preference       Right-sided facial droop. No dysarthria. Tongue protrudes to midline, palate elevates symmetrically. Motor:    No pronator drift. Bulk and tone normal.      5/5 power in bilateral upper extremities and LLE. 4/5 in RLE. No involuntary movements. Sensation:    Sensation intact throughout to light touch       Coordination & Gait: Unable to obtain FTN and HTS due to receptive aphasia    NIHSS:      1a-LOC:0    1b-Month/Age:2    1c-Open/Close Hand:1    2-Best Gaze:2    3-Visual Fields:0    4-Facial Palsy:2    5a-Left Arm:0    5b-Right Arm:0    6a-Left Le    6b-Right Le    7-Limb Ataxia:0    8-Sensory:0    9-Best Language:2    10-Dysarthria:0    11-Extinction/Inattention:1    TOTAL SCORE:11    Labs:  Lab Results   Component Value Date/Time    WBC 7.5 2020 10:32 AM    HGB 11.6 2020 10:32 AM    HCT 36.4 2020 10:32 AM    PLATELET 980 (L) 8293 10:32 AM    .7 (H) 2020 10:32 AM      Lab Results   Component Value Date/Time    Sodium 136 2020 10:32 AM    Potassium 4.6 2020 10:32 AM    Chloride 106 2020 10:32 AM    CO2 27 2020 10:32 AM    Anion gap 3 (L) 2020 10:32 AM    Glucose 98 2020 10:32 AM    BUN 25 (H) 2020 10:32 AM    Creatinine 1.15 (H) 2020 10:32 AM    BUN/Creatinine ratio 22 (H) 2020 10:32 AM    GFR est AA 55 (L) 2020 10:32 AM    GFR est non-AA 45 (L) 2020 10:32 AM    Calcium 8.5 2020 10:32 AM       Imaging:  CT Results (maximum last 3): Results from [place] encounter on 20   CT CODE NEURO PERF W CBF    Narrative *PRELIMINARY REPORT*    Perfusion deficit left posterior frontal lobe corresponding with infarct. Perfusion abnormality left WALDEMAR territory.  Left M2 occlusion (2, 614-915 with  distal reconstitution and left WALDEMAR occlusion (2, 468) with distal  reconstitution. Question small irregular branch from left M1 anteriorly which  becomes more prominent distally (2, 419). Suspect thromboembolic disease with  remote chance of vasculitis. Small right parafalcine and right tentorial  subdural hematomas redemonstrated. Preliminary report was provided by Dr. Shanell Longoria, the on-call radiologist, at  10:43    Final report to follow. *END PRELIMINARY REPORT*    The findings were called to Dr. Declan Levin on 1/18/2020 at 10:46 by myself. [address]    Narrative *PRELIMINARY REPORT*    Perfusion deficit left posterior frontal lobe corresponding with infarct. Perfusion abnormality left WALDEMAR territory. Left M2 occlusion (2, 451-453 with  distal reconstitution and left WALDEMAR occlusion (2, 468) with distal  reconstitution. Question small irregular branch from left M1 anteriorly which  becomes more prominent distally (2, 419). Suspect thromboembolic disease with  remote chance of vasculitis. Small right parafalcine and right tentorial  subdural hematomas redemonstrated. Preliminary report was provided by Dr. Shanell Longoria, the on-call radiologist, at  10:43    Final report to follow. *END PRELIMINARY REPORT*    The findings were called to Dr. Declan Levin on 1/18/2020 at 10:46 by myself. Ang Simmons WO CONTRAST    Narrative EXAM: CT CODE NEURO HEAD WO CONTRAST    INDICATION: gaze deviation, aphasia    COMPARISON: None. CONTRAST: None. TECHNIQUE: Unenhanced CT of the head was performed using 5 mm images. Brain and  bone windows were generated. CT dose reduction was achieved through use of a  standardized protocol tailored for this examination and automatic exposure  control for dose modulation. FINDINGS:  There is a thin hyperdense subdural hemorrhage along the right falx extending to  the vertex and posteriorly as well as along the right tentorium.  There is focal  scalp swelling and small hematoma over the high right occiput. The ventricles  and sulci are normal in size, shape and configuration and midline. There is no  significant white matter disease. There is no intracranial hemorrhage,  extra-axial collection, mass, mass effect or midline shift. The basilar  cisterns are open. No acute infarct is identified. The bone windows demonstrate  no abnormalities. The visualized portions of the paranasal sinuses and mastoid  air cells are clear. Impression IMPRESSION: Acute thin right parafalcine and tentorial subdural hemorrhage with  small right occipital right scalp swelling and hematoma. The findings were called to Dr. Farheen Morgan on 1/18/2020 at 10:01 by myself. [id number]         Assessment:     Hospital Problems  Date Reviewed: 7/19/2018          Codes Class Noted POA    Stroke (cerebrum) [facility]) ICD-10-CM: I63.9  ICD-9-CM: 434.91  1/18/2020 Unknown            Plan:   1.) CVA - left MCA and WALDEMAR territories              - Aspirin 300 IL q day              - NIH of 11 on exam              - q1 neuro checks              - A1C and lipid panel ordered, LDL goal <70              - MRI brain ordered              - ECHO ordered              - PT/OT/SLP evals              - Stroke education              - SBP goal 140-160, allow permissive HTN in the setting of acute CVA              - Home meds per Intensivist        I have discussed the diagnosis and the intended plan as seen in the above orders with Dr. Wei Barron, Dr. Yuly Woo, Dr. Michel Darnell, the patient and the primary RN. Patient/family updated on current plan of care and is in agreement. All questions were answered. Thank you for this consult and participating in the care of this patient.   Signed By: Pushpa Benson NP     January 18, 2020

## 2020-01-18 NOTE — H&P
[facility] Adult  Hospitalist Group                                                                                          Critical Care H and P Note  Lyric Vasquez MD  Answering service: [phone] from in house phone        Date of Service:  2020  NAME:  Elaina Armstrong  :  1935  MRN:  [id number]      History and Present Illness:     80 yo female living independently with  last know well 20 @ 1045.  heard patient fall this morning around 9 am and called EMS. Pt brought to [address] [place] [address] ED and stroke code called. When I   Saw her in the ER she had Left gaze preference , head also tilted somewhat to the left, with paresis  Of the right upper extremity, sometimes says ok vs nothing to most questions. Perfusion study shows infarcted left MCA  territory distal to thrombus; Perfusion defect also noted in WALDEMAR territory corresponding with a small area of ischemia with small thrombus noted in WALDEMAR pericallosal branch; filling distal to both emboli noted. The patient will be admitted to the ICU for neuro checks and close observation  And optimization of blood pressure. Assessment & Plan:      Left MCA Thromboembolic Stroke-completed   Left WALDEMAR thrombus with small area of ischemia  Sp Fall c small right occipital right scalp swelling and hematoma. (due to the above most likely)  Acute thin right parafalcine and tentorial subdural hemorrhage  Hx of mild dementia  Hx of ongoing tobacco use      Plan:    Goal -160   Phenylephrine as needed to achieve goal  IVF bolus going in. Fu neurology recs  ST/OT/PT consults  Lipid panel  Echo  Defer possible MRI to neurology  Lifestyle changes  neurochecks as per neurology  Admit to ICU    Code status:Full  DVT prophylaxis: SCDs    Care Plan discussed with: Patient/Family and Nurse  Disposition: Admit to ICU     Critical Care Time: 45  min.        Hospital Problems  Date Reviewed: 2018          Codes Class Noted POA Stroke (cerebrum) [facility]) ICD-10-CM: I63.9  ICD-9-CM: 434.91  1/18/2020 Unknown                Review of Systems:   Review of systems not obtained due to patient factors. Vital Signs:    Last 24hrs VS reviewed since prior progress note. Most recent are:  Visit Vitals  /53   Pulse 62   Temp 98.2 °F (36.8 °C)   Resp 9   Ht 5' 6\" (1.676 m)   Wt 56.7 kg (125 lb)   SpO2 97%   Breastfeeding No   BMI 20.18 kg/m²         Intake/Output Summary (Last 24 hours) at 1/18/2020 1335  Last data filed at 1/18/2020 1300  Gross per 24 hour   Intake 100 ml   Output 400 ml   Net -300 ml        Physical Examination:             Constitutional:  No acute distress, cooperative, pleasant    ENT:  Oral mucous moist, oropharynx benign. Resp:  CTA bilaterally. No wheezing/rhonchi/rales. No accessory muscle use   CV:  Regular rhythm, normal rate, no murmurs, gallops, rubs    GI:  Soft, non distended, non tender. normoactive bowel sounds, no hepatosplenomegaly     Musculoskeletal:  No edema, warm, 2+ pulses throughout    Neurologic:  awake, alert, weak on the right hand,still perhaps about somnolent. Psych:  Good insight, Not anxious nor agitated. Skin:  Good turgor, no rashes or ulcers  Hematologic/Lymphatic/Immunlogic:  No jaundice nor lymph node swelling  :  Normal genitalia. Eyes:  EOMI. Anicteric sclerae, PERRL. Data Review:    Review and/or order of clinical lab test      Labs:     Recent Labs     01/18/20  1032   WBC 7.5   HGB 11.6   HCT 36.4   *     Recent Labs     01/18/20  1032      K 4.6      CO2 27   BUN 25*   CREA 1.15*   GLU 98   CA 8.5     Recent Labs     01/18/20  1032   SGOT 36   ALT 18   AP 54   TBILI 0.4   TP 6.5   ALB 3.3*   GLOB 3.2     Recent Labs     01/18/20  1121   INR 1.0   PTP 10.5   APTT 21.6*      No results for input(s): FE, TIBC, PSAT, FERR in the last 72 hours. No results found for: FOL, RBCF   No results for input(s): PH, PCO2, PO2 in the last 72 hours.   Recent Labs 01/18/20  1032   TROIQ <0.05     No results found for: CHOL, CHOLX, CHLST, CHOLV, HDL, HDLP, LDL, LDLC, DLDLP, TGLX, TRIGL, TRIGP, CHHD, CHHDX  No results found for: [facility]  Lab Results   Component Value Date/Time    Color YELLOW/STRAW 01/18/2020 11:13 AM    Appearance TURBID (A) 01/18/2020 11:13 AM    Specific gravity 1.015 01/18/2020 11:13 AM    Specific gravity 1.014 11/12/2018 10:19 PM    pH (UA) 7.0 01/18/2020 11:13 AM    Protein TRACE (A) 01/18/2020 11:13 AM    Glucose NEGATIVE  01/18/2020 11:13 AM    Ketone NEGATIVE  01/18/2020 11:13 AM    Bilirubin NEGATIVE  01/18/2020 11:13 AM    Urobilinogen 0.2 01/18/2020 11:13 AM    Nitrites NEGATIVE  01/18/2020 11:13 AM    Leukocyte Esterase LARGE (A) 01/18/2020 11:13 AM    Epithelial cells FEW 01/18/2020 11:13 AM    Bacteria 1+ (A) 01/18/2020 11:13 AM    WBC >100 (H) 01/18/2020 11:13 AM    RBC 20-50 01/18/2020 11:13 AM         Medications Reviewed:     Current Facility-Administered Medications   Medication Dose Route Frequency    iopamidoL (ISOVUE-370) 76 % injection 50 mL  50 mL IntraVENous RAD ONCE    PHENYLephrine (SHAMA-SYNEPHRINE) 30 mg in 0.9% sodium chloride 250 mL infusion   mcg/min IntraVENous TITRATE     ______________________________________________________________________  EXPECTED LENGTH OF STAY: - - -  ACTUAL LENGTH OF STAY:          0                 Mynor Potts MD

## 2020-01-18 NOTE — ED NOTES
Spoke with patients son/ via telephone who reported she was not seen by family this morning, they only heard her fall. She was last seen 2045 last night by her  prior to bed without deficits.  Communicated to MD.

## 2020-01-18 NOTE — ROUTINE PROCESS
TRANSFER - OUT REPORT: 
 
Verbal report given to TORY Cunha(name) on Erlin Reed  being transferred to ICU 3(unit) for routine progression of care Report consisted of patients Situation, Background, Assessment and  
Recommendations(SBAR). Information from the following report(s) SBAR was reviewed with the receiving nurse. Lines:  
Peripheral IV 01/18/20 Left Antecubital (Active) Peripheral IV 01/18/20 Right Antecubital (Active) Opportunity for questions and clarification was provided.    
 
Patient transported with: 
 Monitor x 3

## 2020-01-18 NOTE — PROGRESS NOTES
Admission Medication Reconciliation:    Information obtained from:  Family, Chart Review, Rx Claims History, [place] Prescription Monitoring Program    RxQuery data available¹:  NO    Comments/Recommendations: Updated PTA meds/reviewed patient's allergies. 1)  Medication history from daughter. Pictures of medication bottles from [facility]. Daughter manages medication via pillbox - excellent historian. Allergies reviewed, last doses reviewed. 2)  Medication changes (since last review): Added  - Donepezil   - Memantine   - Herbal: Swiss Zuly herbal stool softener: 1 tablet/capsule daily scheduled     Adjusted  - Lexapro - old regimen: 5 mg daily / new regimen: 20 mg daily  - Synthroid - old regimen: 100 mcg daily / new regimen: 100 mcg Monday through Saturday & 50 mcg on ; patient takes brand Synthroid       Removed  - Augmentin  - Hydrochlorothiazide  - Zofran  - Exelon patch         ¹RxQuery pharmacy benefit data reflects medications filled and processed through the patient's insurance, however   this data does NOT capture whether the medication was picked up or is currently being taken by the patient. Allergies:  Sulfamethoxazole-trimethoprim; Darvocet a500 [propoxyphene n-acetaminophen]; Percocet [oxycodone-acetaminophen]; and Rocephin [ceftriaxone]    Significant PMH/Disease States:   Past Medical History:   Diagnosis Date    Breast CA ([facility] [address]     Fluid retention in legs     Hypertension     Hypothyroidism      Chief Complaint for this Admission:    Chief Complaint   Patient presents with    Altered mental status    Fall     Prior to Admission Medications:   Prior to Admission Medications   Prescriptions Last Dose Informant Taking? OTHER 2020 at Unknown time  Yes   Si Tab daily. Swiss Zuly Herbal Laxative: Take 1 tablet by mouth daily   Indications: constipation   cranberry 500 mg capsule 2020 at Unknown time  Yes   Sig: Take 500 mg by mouth daily.    donepeziL (ARICEPT) 5 mg tablet 1/17/2020 at Unknown time  Yes   Sig: Take 5 mg by mouth nightly. escitalopram oxalate (LEXAPRO) 20 mg tablet 1/17/2020 at Unknown time  Yes   Sig: Take 20 mg by mouth daily. levothyroxine (SYNTHROID) 100 mcg tablet 1/17/2020 at Unknown time  Yes   Sig: Take 100 mcg by mouth six (6) days a week. Take 100 mcg by mouth Monday through Saturday. Take half-tablet (50 mcg) by mouth on Sunday   levothyroxine (SYNTHROID) 50 mcg tablet 1/12/2020  Yes   Sig: Take 50 mcg by mouth every Sunday. memantine ER (NAMENDA XR) 28 mg capsule 1/17/2020 at Unknown time  Yes   Sig: Take 28 mg by mouth daily. multivit-min/iron/folic/lutein (CENTRUM SILVER WOMEN PO) 1/17/2020 at Unknown time  Yes   Sig: Take 1,500 mg by mouth daily. Facility-Administered Medications: None       Please contact the main inpatient pharmacy with any questions or concerns at [phone] and we will direct you to the clinical pharmacist covering this patient's care while in-house.    Alphonse Huang, FARIBAD

## 2020-01-18 NOTE — PROGRESS NOTES
Neurocritical Care Code Stroke Documentation    Symptoms:   Aphasia, right-sided facial droop   Last Known Well: 20 20:45   Medical hx: Active Problems:    Stroke (cerebrum) ([facility] [address] (2020)    Hyperlipidemia, smoking, hypothyroid, HTN   Anticoagulation: None   VAN:   Positive   NIHSS: completed at 11:19 20   1a-LOC:0    1b-Month/Age:2    1c-Open/Close Hand:1    2-Best Gaze:2    3-Visual Fields:0    4-Facial Palsy:2    5a-Left Arm:0    5b-Right Arm:0    6a-Left Le    6b-Right Le    7-Limb Ataxia:0    8-Sensory:0    9-Best Language:2    10-Dysarthria:0    11-Extinction/Inattention:1  TOTAL SCORE:11   Imaging:   CTAcute thin right parafalcine and tentorial subdural hemorrhage with  small right occipital right scalp swelling and hematoma   CTA/CTP Perfusion deficit left posterior frontal lobe corresponding with infarct. Perfusion abnormality left WALDEMAR territory. Left M2 occlusion with  distal reconstitution and left WALDEMAR occlusion    Plan:   TPA Candidate: NO    Mechanical thrombectomy Candidate: NO     Recommendations per Neurology. Discussed with Dr. Maira Chen, Dr. Garrick Mason and Dr. Danisha Street.      Toney Harrison NP  Neurocritical Care Nurse Practitioner  [phone]

## 2020-01-19 ENCOUNTER — APPOINTMENT (OUTPATIENT)
Dept: MRI IMAGING | Age: 85
DRG: 064 | End: 2020-01-19
Attending: NURSE PRACTITIONER
Payer: MEDICARE

## 2020-01-19 ENCOUNTER — APPOINTMENT (OUTPATIENT)
Dept: NON INVASIVE DIAGNOSTICS | Age: 85
DRG: 064 | End: 2020-01-19
Attending: NURSE PRACTITIONER
Payer: MEDICARE

## 2020-01-19 ENCOUNTER — APPOINTMENT (OUTPATIENT)
Dept: CT IMAGING | Age: 85
DRG: 064 | End: 2020-01-19
Attending: NURSE PRACTITIONER
Payer: MEDICARE

## 2020-01-19 LAB
AV VELOCITY RATIO: 0.67
CHOLEST SERPL-MCNC: 252 MG/DL
ECHO AO ROOT DIAM: 3.44 CM
ECHO AV AREA PEAK VELOCITY: 1.8 CM2
ECHO AV CUSP MM: 1.89 CM
ECHO AV PEAK GRADIENT: 11.6 MMHG
ECHO AV PEAK VELOCITY: 170.64 CM/S
ECHO LA AREA 4C: 16.8 CM2
ECHO LA MAJOR AXIS: 3.08 CM
ECHO LA TO AORTIC ROOT RATIO: 0.9
ECHO LA VOL 2C: 74.37 ML (ref 22–52)
ECHO LA VOL 4C: 40.46 ML (ref 22–52)
ECHO LA VOL BP: 59.67 ML (ref 22–52)
ECHO LA VOL/BSA BIPLANE: 35.56 ML/M2 (ref 16–28)
ECHO LA VOLUME INDEX A2C: 44.32 ML/M2 (ref 16–28)
ECHO LA VOLUME INDEX A4C: 24.11 ML/M2 (ref 16–28)
ECHO LV E' LATERAL VELOCITY: 6.37 CM/S
ECHO LV E' SEPTAL VELOCITY: 5.14 CM/S
ECHO LV EDV A2C: 63.8 ML
ECHO LV EDV A4C: 52.1 ML
ECHO LV EDV BP: 58 ML (ref 56–104)
ECHO LV EDV INDEX A4C: 31.1 ML/M2
ECHO LV EDV INDEX BP: 34.6 ML/M2
ECHO LV EDV NDEX A2C: 38 ML/M2
ECHO LV EJECTION FRACTION A2C: 58 %
ECHO LV EJECTION FRACTION A4C: 45 %
ECHO LV EJECTION FRACTION BIPLANE: 50.2 % (ref 55–100)
ECHO LV ESV A2C: 26.7 ML
ECHO LV ESV A4C: 28.9 ML
ECHO LV ESV BP: 28.9 ML (ref 19–49)
ECHO LV ESV INDEX A2C: 15.9 ML/M2
ECHO LV ESV INDEX A4C: 17.2 ML/M2
ECHO LV ESV INDEX BP: 17.2 ML/M2
ECHO LV INTERNAL DIMENSION DIASTOLIC: 3.57 CM (ref 3.9–5.3)
ECHO LV INTERNAL DIMENSION SYSTOLIC: 2.72 CM
ECHO LV IVSD: 1.08 CM (ref 0.6–0.9)
ECHO LV MASS 2D: 128.2 G (ref 67–162)
ECHO LV MASS INDEX 2D: 76.4 G/M2 (ref 43–95)
ECHO LV POSTERIOR WALL DIASTOLIC: 1.01 CM (ref 0.6–0.9)
ECHO LVOT DIAM: 1.85 CM
ECHO LVOT PEAK GRADIENT: 5.3 MMHG
ECHO LVOT PEAK VELOCITY: 114.8 CM/S
ECHO MV A VELOCITY: 92.51 CM/S
ECHO MV AREA PHT: 2 CM2
ECHO MV E DECELERATION TIME (DT): 372.9 MS
ECHO MV E VELOCITY: 64.15 CM/S
ECHO MV E/A RATIO: 0.69
ECHO MV E/E' LATERAL: 10.07
ECHO MV E/E' RATIO (AVERAGED): 11.28
ECHO MV E/E' SEPTAL: 12.48
ECHO MV PRESSURE HALF TIME (PHT): 108.1 MS
ECHO PV MAX VELOCITY: 92.27 CM/S
ECHO PV PEAK GRADIENT: 3.4 MMHG
ECHO RV TAPSE: 1.95 CM (ref 1.5–2)
ECHO TV REGURGITANT MAX VELOCITY: 258.95 CM/S
ECHO TV REGURGITANT PEAK GRADIENT: 26.8 MMHG
EST. AVERAGE GLUCOSE BLD GHB EST-MCNC: 120 MG/DL
HBA1C MFR BLD: 5.8 % (ref 4–5.6)
HDLC SERPL-MCNC: 30 MG/DL
HDLC SERPL: 8.4 {RATIO} (ref 0–5)
LDLC SERPL CALC-MCNC: 169 MG/DL (ref 0–100)
LIPID PROFILE,FLP: ABNORMAL
LVFS 2D: 24.03 %
MV DEC SLOPE: 1.72
TRIGL SERPL-MCNC: 265 MG/DL (ref ?–150)
VLDLC SERPL CALC-MCNC: 53 MG/DL

## 2020-01-19 PROCEDURE — 36415 COLL VENOUS BLD VENIPUNCTURE: CPT

## 2020-01-19 PROCEDURE — 70450 CT HEAD/BRAIN W/O DYE: CPT

## 2020-01-19 PROCEDURE — 74011250637 HC RX REV CODE- 250/637: Performed by: NURSE PRACTITIONER

## 2020-01-19 PROCEDURE — 97161 PT EVAL LOW COMPLEX 20 MIN: CPT

## 2020-01-19 PROCEDURE — 74011250636 HC RX REV CODE- 250/636: Performed by: INTERNAL MEDICINE

## 2020-01-19 PROCEDURE — 93306 TTE W/DOPPLER COMPLETE: CPT

## 2020-01-19 PROCEDURE — 70551 MRI BRAIN STEM W/O DYE: CPT

## 2020-01-19 PROCEDURE — 80061 LIPID PANEL: CPT

## 2020-01-19 PROCEDURE — 83036 HEMOGLOBIN GLYCOSYLATED A1C: CPT

## 2020-01-19 PROCEDURE — 97165 OT EVAL LOW COMPLEX 30 MIN: CPT

## 2020-01-19 PROCEDURE — 97116 GAIT TRAINING THERAPY: CPT

## 2020-01-19 PROCEDURE — 65610000006 HC RM INTENSIVE CARE

## 2020-01-19 PROCEDURE — 97535 SELF CARE MNGMENT TRAINING: CPT

## 2020-01-19 PROCEDURE — 74011250636 HC RX REV CODE- 250/636: Performed by: NURSE PRACTITIONER

## 2020-01-19 RX ORDER — SODIUM CHLORIDE 9 MG/ML
500 INJECTION, SOLUTION INTRAVENOUS ONCE
Status: COMPLETED | OUTPATIENT
Start: 2020-01-19 | End: 2020-01-19

## 2020-01-19 RX ORDER — SODIUM CHLORIDE, SODIUM LACTATE, POTASSIUM CHLORIDE, CALCIUM CHLORIDE 600; 310; 30; 20 MG/100ML; MG/100ML; MG/100ML; MG/100ML
75 INJECTION, SOLUTION INTRAVENOUS CONTINUOUS
Status: DISCONTINUED | OUTPATIENT
Start: 2020-01-19 | End: 2020-01-20

## 2020-01-19 RX ADMIN — SODIUM CHLORIDE 500 ML: 900 INJECTION, SOLUTION INTRAVENOUS at 11:41

## 2020-01-19 RX ADMIN — PHENYLEPHRINE HYDROCHLORIDE 80 MCG/MIN: 10 INJECTION INTRAVENOUS at 17:23

## 2020-01-19 RX ADMIN — ASPIRIN 300 MG: 300 SUPPOSITORY RECTAL at 10:04

## 2020-01-19 RX ADMIN — SODIUM CHLORIDE, SODIUM LACTATE, POTASSIUM CHLORIDE, AND CALCIUM CHLORIDE 75 ML/HR: 600; 310; 30; 20 INJECTION, SOLUTION INTRAVENOUS at 11:30

## 2020-01-19 RX ADMIN — PHENYLEPHRINE HYDROCHLORIDE 75 MCG/MIN: 10 INJECTION INTRAVENOUS at 10:17

## 2020-01-19 RX ADMIN — PHENYLEPHRINE HYDROCHLORIDE 30 MCG/MIN: 10 INJECTION INTRAVENOUS at 02:08

## 2020-01-19 NOTE — PROGRESS NOTES
Orders received, chart reviewed and patient evaluated by occupational therapy. Pending progression with skilled acute occupational therapy, recommend: To be determined: IPR vs. HHOT and increased assist from family pending progress     Recommend with nursing patient to complete as able in order to maintain strength, endurance and independence: OOB to chair 3x/day with 2 assist (2nd for lines) and functional mobility to the bathroom with 2 assist (2nd for lines). Thank you for your assistance. Full evaluation to follow.

## 2020-01-19 NOTE — PROGRESS NOTES
Transitions of care  Rehab vs home with [facility]    Reason for Admission:   Presented after a fall at home, now with left sided weakness and aphasia. RRAT Score:  12 / low                   Plan for utilizing home health:   TBD                       Current Advanced Directive/Advance Care Plan: not on file                         Care manager met with patient and her family to introduce self and explain role. Patient lives independently with her  Alycia Rosen [phone]., who is WC bound from a stroke a couple of years ago. Patient drives herself to her appointments, confirmed her PCP to be Dr Kika Henry and sees her at least yearly. No previous equipment needs, has used [facility] in the past for SN and PT. Confirmed her insurance to be Medicare A,B with a [employer] supplement. Will await therapy recommendation to assist with transitions of care.  Bekah Damon RN,CRM    Care Management Interventions  PCP Verified by CM: Yes(Dr Kika Henry)  Palliative Care Criteria Met (RRAT>21 & CHF Dx)?: No  MyChart Signup: No  Discharge Durable Medical Equipment: No  Physical Therapy Consult: Yes  Occupational Therapy Consult: Yes  Speech Therapy Consult: Yes  Current Support Network: Lives with Spouse( Click Melisaneva Alice [phone], Campbell Gordillo [phone])

## 2020-01-19 NOTE — PROGRESS NOTES
Physical Therapy    Orders acknowledged, chart reviewed. PT evaluation completed. Recommend follow up IPR v home with family assist and [facility] PT at d/c. Full report to follow.     Carlitos Saleh, PT, MPT

## 2020-01-19 NOTE — PROGRESS NOTES
Problem: Self Care Deficits Care Plan (Adult)  Goal: *Acute Goals and Plan of Care (Insert Text)  Description  FUNCTIONAL STATUS PRIOR TO ADMISSION: Patient was independent and active without use of DME. Has a cane and RW but does not use them regularly. HOME SUPPORT: The patient lived with  but did not require assist. Patient has good supportive family in area. Occupational Therapy Goals  Initiated 1/19/2020   1. Patient will perform grooming standing at sink for 10 minutes with supervision/set-up within 7 day(s). 2.  Patient will perform bathing using most appropriate DME with supervision/set-up within 7 day(s). 3.  Patient will perform lower body dressing with supervision/set-up within 7 day(s). 4.  Patient will perform toilet transfers with supervision/set-up within 7 day(s). 5.  Patient will perform all aspects of toileting with supervision/set-up within 7 day(s). 6.  Patient will participate in upper extremity therapeutic exercise/activities with supervision/set-up for 5 minutes within 7 day(s). 7.  Patient will utilize energy conservation techniques during functional activities with verbal cues within 7 day(s). Outcome: Progressing Towards Goal    OCCUPATIONAL THERAPY EVALUATION  Patient: Fawad Cali (25 y.o. female)  Date: 1/19/2020  Primary Diagnosis: Stroke (cerebrum) [facility]) [I63.9]        Precautions:  Fall    ASSESSMENT  Based on the objective data described below, the patient presents with limited ADL performance s/p admission for stroke. Imaging positive for L MCA infarct. Patient ADLs limited by impaired balance, generalized weakness and decreased functional activity tolerance, sequencing and complex command following, however most limited by expressive aphasia. At baseline, patient lives at home with her  and was IND with ADLs and mobility. Today, patient with CGA for bed mobility and up to min A for functional transfer to chair.  Patient able to devin socks sitting EOB with setup. Patient BUE ROM, strength, sensation and coordination WFL. Patient able to answer yes/no questions with ~85% accuracy with increased time for processing. patient did speak 2 full sentences with increased time and decreased fluency (needed to pronounce each word slowly). Noted difficulty with multi-step commands or open ended questions. Patient VSS throughout session and within parameters set by neurosurgery. Patient left sitting up in chair with call bell in reach, RN aware, family bedside and all needs met. Will continue to follow. Patient functioning well below her IND baseline and would benefit from IPR once medically cleared for D/C. Current Level of Function Impacting Discharge (ADLs/self-care): up to min A for ADLs    Functional Outcome Measure: The patient scored 45/100 on the Barthel Index outcome measure which is indicative of moderate deficits in ADLs and mobility. Other factors to consider for discharge: was IND at baseline, lives with elderly , expressive aphasia concern for safety     Patient will benefit from skilled therapy intervention to address the above noted impairments. PLAN :  Recommendations and Planned Interventions: self care training, functional mobility training, therapeutic exercise, balance training, therapeutic activities, endurance activities, patient education, home safety training and family training/education    Frequency/Duration: Patient will be followed by occupational therapy 5 times a week to address goals. Recommendation for discharge: (in order for the patient to meet his/her long term goals)  Therapy 3 hours per day 5-7 days per week    This discharge recommendation:  Has been made in collaboration with the attending provider and/or case management    IF patient discharges home will need the following DME: transfer bench and walker: rolling       SUBJECTIVE:   Patient stated I have a walker and a cane. Nadine Salehmanuels looks like Connie Goldstein, doesn't she?\"    OBJECTIVE DATA SUMMARY:   HISTORY:   Past Medical History:   Diagnosis Date    Breast CA ([address]     Fluid retention in legs     Hypertension     Hypothyroidism      Past Surgical History:   Procedure Laterality Date    BREAST SURGERY PROCEDURE UNLISTED      Left lumpectomy    HX ORTHOPAEDIC  2007    left hip replacement       Expanded or extensive additional review of patient history:     Home Situation  Home Environment: Private residence  # Steps to Enter: 10  Rails to Enter: Yes  Hand Rails : Bilateral(too wide to hold both)  One/Two Story Residence: One story  Living Alone: No  Support Systems: Spouse/Significant Other/Partner, Family member(s)  Current DME Used/Available at Home: Cane, straight, Walker, rolling, Shower chair  Tub or Shower Type: Tub/Shower combination    Hand dominance: Right    EXAMINATION OF PERFORMANCE DEFICITS:  Cognitive/Behavioral Status:  Neurologic State: Alert  Orientation Level: Oriented to person;Oriented to place;Oriented to situation;Disoriented to time  Cognition: Appropriate for age attention/concentration; Follows commands  Perception: Appears intact  Perseveration: No perseveration noted  Safety/Judgement: Awareness of environment; Fall prevention    Skin: Appears grossly intact    Edema: none noted in BUEs    Hearing: Auditory  Auditory Impairment: None    Vision/Perceptual:    Tracking: Able to track stimulus in all quadrants w/o difficulty    Diplopia: No    Acuity: Within Defined Limits    Corrective Lenses: Reading glasses    Range of Motion:  In BUEs  AROM: Within functional limits    Strength: In BUEs  Strength: Generally decreased, functional    Coordination:  Coordination: Within functional limits  Fine Motor Skills-Upper: Left Intact; Right Intact    Gross Motor Skills-Upper: Left Intact; Right Intact    Tone & Sensation:  In BUEs  Tone: Normal  Sensation: Intact     Balance:  Sitting: Intact  Standing: Impaired; Without support  Standing - Static: Good  Standing - Dynamic : Fair;Constant support    Functional Mobility and Transfers for ADLs:  Bed Mobility:  Supine to Sit: Contact guard assistance  Scooting: Contact guard assistance    Transfers:  Sit to Stand: Minimum assistance  Stand to Sit: Contact guard assistance  Bed to Chair: Minimum assistance  Assistive Device : Gait Belt    ADL Assessment:  Feeding: Setup*    Oral Facial Hygiene/Grooming: Setup*    Bathing: Minimum assistance*    Upper Body Dressing: Setup*    Lower Body Dressing: Minimum assistance    Toileting: Minimum assistance*    *Infer per obs of func reach, balance, strength, sensation, coordination, safety awareness, sequencing and complex processing. ADL Intervention and task modifications:    Lower Body Dressing Assistance  Socks: Contact guard assistance  Leg Crossed Method Used: Yes  Position Performed: Seated edge of bed  Cues: Don;Verbal cues provided    Toileting  Bladder Hygiene: Total assistance (dependent)(doyle)    Cognitive Retraining  Safety/Judgement: Awareness of environment; Fall prevention     Functional Measure:  Barthel Index:    Bathin  Bladder: 0(doyle)  Bowels: 5  Groomin  Dressin  Feeding: 10  Mobility: 5  Stairs: 0  Toilet Use: 5  Transfer (Bed to Chair and Back): 10  Total: 45/100        The Barthel ADL Index: Guidelines  1. The index should be used as a record of what a patient does, not as a record of what a patient could do. 2. The main aim is to establish degree of independence from any help, physical or verbal, however minor and for whatever reason. 3. The need for supervision renders the patient not independent. 4. A patient's performance should be established using the best available evidence. Asking the patient, friends/relatives and nurses are the usual sources, but direct observation and common sense are also important. However direct testing is not needed.   5. Usually the patient's performance over the preceding 24-48 hours is important, but occasionally longer periods will be relevant. 6. Middle categories imply that the patient supplies over 50 per cent of the effort. 7. Use of aids to be independent is allowed. Nonie Freshwater., Barthel, D.W. (7052). Functional evaluation: the Barthel Index. [address] [facility] (14)2. SAMIR Judge, Indira Cabrera., [facility], [address] [place] [address] (1999). Measuring the change indisability after inpatient rehabilitation; comparison of the responsiveness of the Barthel Index and Functional [place] Measure. Journal of Neurology, Neurosurgery, and Psychiatry, 66(4), 700-532. Nora Smith, NKenanJ.A, GILMAR Barnes, & Chelsie Willis M.A. (2004.) Assessment of post-stroke quality of life in cost-effectiveness studies: The usefulness of the Barthel Index and the EuroQoL-5D. Quality of Life Research, 15, 473-66       Occupational Therapy Evaluation Charge Determination   History Examination Decision-Making   LOW Complexity : Brief history review  LOW Complexity : 1-3 performance deficits relating to physical, cognitive , or psychosocial skils that result in activity limitations and / or participation restrictions  MEDIUM Complexity : Patient may present with comorbidities that affect occupational performnce. Miniml to moderate modification of tasks or assistance (eg, physical or verbal ) with assesment(s) is necessary to enable patient to complete evaluation       Based on the above components, the patient evaluation is determined to be of the following complexity level: MEDIUM  Pain Rating:  none    Activity Tolerance:   Good  Please refer to the flowsheet for vital signs taken during this treatment. After treatment patient left in no apparent distress:    Sitting in chair, Call bell within reach, Caregiver / family present and RN aware, PT present    COMMUNICATION/EDUCATION:   The patients plan of care was discussed with: Physical Therapist, Registered Nurse and .     Home safety education was provided and the patient/caregiver indicated understanding. and Patient/family have participated as able in goal setting and plan of care. This patients plan of care is appropriate for delegation to EZEKIEL.     Thank you for this referral.  Demetria Zuleta OT  Time Calculation: 36 mins

## 2020-01-19 NOTE — PROGRESS NOTES
Neurology Progress Note  Randa Grant NP  Neurocritical Care Nurse Practitioner  [phone]    Patient: Rocky Burdick MRN: [id number]  SSN: [id number]    YOB: 1935  Age: 81 y.o. Sex: female      Chief Complaint: Aphasia, right-sided facial droop     Leida Tracy is a 81 y.o. female with history of smoking, hyperlipidemia, hypothyroidism, dementia and HTN was brought to the ER yesterday morning after her  heard her fall at around 0900. She was last seen well Friday night 01/17 at 20:45. On arrival to the ER she had a head CT that showed an acute thin right parafalcine and tentorial subdural hemorrhage with small right occipital right scalp swelling and hematoma. Her CTA/CTP showed a perfusion deficit in the posterior frontal lobe corresponding with infarct. There was a perfusion abnormality in the left WALDEMAR territory and a left M2 occlusion as well as left WALDEMAR occlusion. She was not a candidate for TPA. Dr. Nida Guy with Neurointerventional Surgery was consulted for possible intervention. However, upon review of her imaging found that thrombectomy was not indicated at this time. She recommended admission to the ICU and for patient to be followed by Intensivist and Neurology. She was started on Kishan to keep her SBP between 140-160. Subjective:   No overnight neuro event. Patient awake and alert with her son Kirk Mckeon at the bedside. Is saying more words today. No longer with the right gaze preference. CT head  performed this morning shows no evidence for hemorrhagic transformation or mass effect from the known left M2 ischemic infarct. Denies headache or tenderness to scalp. Denies numbness, tingling, or visual disturbances. Past Medical History:   Diagnosis Date    Breast CA ([facility] [address]     Fluid retention in legs     Hypertension     Hypothyroidism      History reviewed. No pertinent family history.   Social History     Tobacco Use    Smoking status: Current Every Day Smoker    Smokeless tobacco: Never Used   Substance Use Topics    Alcohol use: Yes     Alcohol/week: 0.0 standard drinks      Prior to Admission Medications   Prescriptions Last Dose Informant Patient Reported? Taking? OTHER 2020 at Unknown time  Yes Yes   Si Tab daily. Swiss Zuly Herbal Laxative: Take 1 tablet by mouth daily   Indications: constipation   cranberry 500 mg capsule 2020 at Unknown time  Yes Yes   Sig: Take 500 mg by mouth daily. donepeziL (ARICEPT) 5 mg tablet 2020 at Unknown time  Yes Yes   Sig: Take 5 mg by mouth nightly. escitalopram oxalate (LEXAPRO) 20 mg tablet 2020 at Unknown time  Yes Yes   Sig: Take 20 mg by mouth daily. levothyroxine (SYNTHROID) 100 mcg tablet 2020 at Unknown time  Yes Yes   Sig: Take 100 mcg by mouth six (6) days a week. Take 100 mcg by mouth Monday through Saturday. Take half-tablet (50 mcg) by mouth on    levothyroxine (SYNTHROID) 50 mcg tablet 2020  Yes Yes   Sig: Take 50 mcg by mouth every . Take 100 mcg by mouth Monday through Saturday. Take half-tablet (50 mcg) by mouth on    memantine ER (NAMENDA XR) 28 mg capsule 2020 at Unknown time  Yes Yes   Sig: Take 28 mg by mouth daily. multivit-min/iron/folic/lutein (CENTRUM SILVER WOMEN PO) 2020 at Unknown time  Yes Yes   Sig: Take 1,500 mg by mouth daily. Facility-Administered Medications: None       Allergies   Allergen Reactions    Sulfamethoxazole-Trimethoprim Hives     Treated with diphenhydramine    Darvocet A500 [Propoxyphene N-Acetaminophen] Itching    Percocet [Oxycodone-Acetaminophen] Itching    Rocephin [Ceftriaxone] Swelling     Questionable allergy, had facial swelling morning after IV administration       Review of Systems:  Unable to obtain due to patient factors. Patient with expressive aphasia.          Objective:     Vitals:    20 0545 20 0600 20 0929 20 0941   BP:  128/62 146/88 151/64   Pulse: 64 (!) 53 Resp: 12 14     Temp:       SpO2: 100% 98%     Weight:  60.9 kg (134 lb 4.2 oz)     Height:          Physical Exam:  GENERAL: Calm, cooperative, NAD  EYES: Pupils 2+. PERRL  HEART: RRR  LUNGS: CTA. No dyspnea. SKIN: Warm, dry, color appropriate for ethnicity. No peripheral edema. Neurologic Exam:  Mental Status:  Alert, follows commands. Does not answer all yes/no questions appropriately. Language:    Expressive aphasia still present but much improved. Can say yes/no, okay, and Mann (her son's name.)      Understanding is much improved. Cranial Nerves:   Pupils 2 mm, equal, round and reactive to light. Visual fields intact. EOMI. No longer has left gaze preference. Right-sided facial droop still present but less prominent. No dysarthria. Tongue protrudes to midline, palate elevates symmetrically. Motor:    No pronator drift. Bulk and tone normal.      5/5 power in all 4 extremities     No involuntary movements. Sensation:    Sensation intact throughout to light touch       Coordination & Gait: No ataxia noted with FTN and HTS Gait deferred.      NIHSS:      1a-LOC:0    1b-Month/Age:2    1c-Open/Close Hand:0    2-Best Gaze:0    3-Visual Fields:0    4-Facial Palsy:1    5a-Left Arm:0    5b-Right Arm:0    6a-Left Le    6b-Right Le    7-Limb Ataxia:0    8-Sensory:0    9-Best Language:2    10-Dysarthria:0    11-Extinction/Inattention:0    TOTAL SCORE:6    Labs:  Lab Results   Component Value Date/Time    WBC 7.5 2020 10:32 AM    HGB 11.6 2020 10:32 AM    HCT 36.4 2020 10:32 AM    PLATELET 967 (L)  10:32 AM    .7 (H) 2020 10:32 AM      Lab Results   Component Value Date/Time    Sodium 136 2020 10:32 AM    Potassium 4.6 2020 10:32 AM    Chloride 106 2020 10:32 AM    CO2 27 2020 10:32 AM    Anion gap 3 (L) 2020 10:32 AM    Glucose 98 2020 10:32 AM    BUN 25 (H) 2020 10:32 AM    Creatinine 1.15 (H) 01/18/2020 10:32 AM    BUN/Creatinine ratio 22 (H) 01/18/2020 10:32 AM    GFR est AA 55 (L) 01/18/2020 10:32 AM    GFR est non-AA 45 (L) 01/18/2020 10:32 AM    Calcium 8.5 01/18/2020 10:32 AM       Imaging:  CT Results (maximum last 3): Results from [place] encounter on 01/18/20   CT CODE NEURO PERF W CBF    Narrative *PRELIMINARY REPORT*    Perfusion deficit left posterior frontal lobe corresponding with infarct. Perfusion abnormality left WALDEMAR territory. Left M2 occlusion (2, 451-453 with  distal reconstitution and left WALDEMAR occlusion (2, 468) with distal  reconstitution. Question small irregular branch from left M1 anteriorly which  becomes more prominent distally (2, 419). Suspect thromboembolic disease with  remote chance of vasculitis. Small right parafalcine and right tentorial  subdural hematomas redemonstrated. Preliminary report was provided by Dr. Tika Abdi, the on-call radiologist, at  10:43    Final report to follow. *END PRELIMINARY REPORT*    The findings were called to Dr. Karlee Boss on 1/18/2020 at 10:46 by myself. [address] CONT    Narrative *PRELIMINARY REPORT*    Perfusion deficit left posterior frontal lobe corresponding with infarct. Perfusion abnormality left WALDEMAR territory. Left M2 occlusion (2, 451-453 with  distal reconstitution and left WALDEMAR occlusion (2, 468) with distal  reconstitution. Question small irregular branch from left M1 anteriorly which  becomes more prominent distally (2, 419). Suspect thromboembolic disease with  remote chance of vasculitis. Small right parafalcine and right tentorial  subdural hematomas redemonstrated. Preliminary report was provided by Dr. Tika Abdi, the on-call radiologist, at  10:43    Final report to follow. *END PRELIMINARY REPORT*    The findings were called to Dr. Karlee Boss on 1/18/2020 at 10:46 by myself.   [address]           CT CODE NEURO HEAD WO CONTRAST    Narrative EXAM: CT CODE NEURO HEAD WO CONTRAST    INDICATION: gaze deviation, aphasia    COMPARISON: None. CONTRAST: None. TECHNIQUE: Unenhanced CT of the head was performed using 5 mm images. Brain and  bone windows were generated. CT dose reduction was achieved through use of a  standardized protocol tailored for this examination and automatic exposure  control for dose modulation. FINDINGS:  There is a thin hyperdense subdural hemorrhage along the right falx extending to  the vertex and posteriorly as well as along the right tentorium. There is focal  scalp swelling and small hematoma over the high right occiput. The ventricles  and sulci are normal in size, shape and configuration and midline. There is no  significant white matter disease. There is no intracranial hemorrhage,  extra-axial collection, mass, mass effect or midline shift. The basilar  cisterns are open. No acute infarct is identified. The bone windows demonstrate  no abnormalities. The visualized portions of the paranasal sinuses and mastoid  air cells are clear. Impression IMPRESSION: Acute thin right parafalcine and tentorial subdural hemorrhage with  small right occipital right scalp swelling and hematoma. The findings were called to Dr. Clemente Brooks on 1/18/2020 at 10:01 by myself. [id number]         Assessment:     Hospital Problems  Date Reviewed: 7/19/2018          Codes Class Noted POA    Stroke (cerebrum) [facility]) ICD-10-CM: I63.9  ICD-9-CM: 434.91  1/18/2020 Unknown            Plan:   1.) CVA - left MCA and WALDEMAR territories              - Aspirin [address] q day              - NIH of 11 on exam yesterday, improved to 6 today              - q1 neuro checks              - A1C 5.8 and lipid panel shows LDL of 169, LDL goal <70.  Son states she is intolerant to statins.               - MRI brain ordered              - ECHO ordered              - PT/OT/SLP evals              - Stroke education              - Okay to liberalize SBP goals to 110-160.    - NPO until speech evaluation due to difficulty swallowing water in bedside swallow eval.     - Begin IVF LR 75ml/h. Discussed with Intensivist.    - CT head this am  showed no evidence for hemorrhagic transformation or mass effect from the   known left M2 ischemic infarct              - Home meds per Intensivist        I have discussed the diagnosis and the intended plan as seen in the above orders with Dr. Alejandro Quesada, Dr. Cristal Oliva, the patient and the primary RN. Patient/family updated on current plan of care and is in agreement. All questions were answered. Thank you for this consult and participating in the care of this patient.   Signed By: Crista Haskins NP     January 19, 2020

## 2020-01-19 NOTE — PROGRESS NOTES
[facility]    ICU TEAM Progress Note    Name: Ashok Martin   : 1935   MRN: [id number]   Date: 2020      Subjective:   Progress Note: 2020    CC: Fall   Reason for ICU Admission: L MCA infarct      Overnight Events: No significant overnight events, this AM patient is able to follow commands, expressive aphasia     POD:* No surgery found *    S/P:     Active Problem List:     Problem List  Date Reviewed: 2018          Codes Class    Stroke (cerebrum) (HCC) ICD-10-CM: I63.9  ICD-9-CM: 434.91         UTI (urinary tract infection) ICD-10-CM: N39.0  ICD-9-CM: 599.0               Past Medical History:      has a past medical history of Breast CA ([facility] [address] Fluid retention in legs, Hypertension, and Hypothyroidism. Past Surgical History:      has a past surgical history that includes hx orthopaedic () and pr breast surgery procedure unlisted. Home Medications:     Prior to Admission medications    Medication Sig Start Date End Date Taking? Authorizing Provider   levothyroxine (SYNTHROID) 50 mcg tablet Take 50 mcg by mouth every . Take 100 mcg by mouth Monday through Saturday. Take half-tablet (50 mcg) by mouth on    Yes Provider, Historical   donepeziL (ARICEPT) 5 mg tablet Take 5 mg by mouth nightly. Yes Provider, Historical   memantine ER (NAMENDA XR) 28 mg capsule Take 28 mg by mouth daily. Yes Provider, Historical   OTHER 1 Tab daily. Catalina Caruso Herbal Laxative: Take 1 tablet by mouth daily   Indications: constipation   Yes Provider, Historical   cranberry 500 mg capsule Take 500 mg by mouth daily. 18  Yes Provider, Historical   multivit-min/iron/folic/lutein (CENTRUM SILVER WOMEN PO) Take 1,500 mg by mouth daily. Yes Clinton Ivey MD   escitalopram oxalate (LEXAPRO) 20 mg tablet Take 20 mg by mouth daily. Yes Provider, Historical   levothyroxine (SYNTHROID) 100 mcg tablet Take 100 mcg by mouth six (6) days a week.  Take 100 mcg by mouth Monday through Saturday. Take half-tablet (50 mcg) by mouth on    Yes Provider, Historical       Allergies/Social/Family History: Allergies   Allergen Reactions    Sulfamethoxazole-Trimethoprim Hives     Treated with diphenhydramine    Darvocet A500 [Propoxyphene N-Acetaminophen] Itching    Percocet [Oxycodone-Acetaminophen] Itching    Rocephin [Ceftriaxone] Swelling     Questionable allergy, had facial swelling morning after IV administration      Social History     Tobacco Use    Smoking status: Current Every Day Smoker    Smokeless tobacco: Never Used   Substance Use Topics    Alcohol use: Yes     Alcohol/week: 0.0 standard drinks      History reviewed. No pertinent family history. Review of Systems:     Pertinent items are noted in HPI. Objective:   Vital Signs:  Visit Vitals  /62   Pulse (!) 53   Temp 97.8 °F (36.6 °C)   Resp 14   Ht 5' 6\" (1.676 m)   Wt 60.9 kg (134 lb 4.2 oz)   SpO2 98%   Breastfeeding No   BMI 21.67 kg/m²      O2 Device: Room air Temp (24hrs), Av.8 °F (36.6 °C), Min:97.5 °F (36.4 °C), Max:98.2 °F (36.8 °C)           Intake/Output:     Intake/Output Summary (Last 24 hours) at 2020 0939  Last data filed at 2020 0600  Gross per 24 hour   Intake 1238.05 ml   Output 1475 ml   Net -236.95 ml       Physical Exam:    General:  alert, cooperative, mild distress, appears stated age  Eye:  conjunctivae/corneas clear. PERRL, EOM's intact. Fundi benign  Neurologic: mild R facial droop, no drift, moving all ext, 5/5   Neck:  neck supple and symmetrical.  trachea midline  Lungs:  clear to auscultation bilaterally  Heart:  regular rate, S1, S2   Abdomen:  soft, non-tender.  Bowel sounds normal. No masses,  no organomegaly  Skin:  Warm, dry,     LABS AND  DATA: Personally reviewed  Recent Labs     20  1032   WBC 7.5   HGB 11.6   HCT 36.4   *     Recent Labs     20  1032      K 4.6      CO2 27   BUN 25*   CREA 1.15*   GLU 98   CA 8.5     Recent Labs     01/18/20  1032   SGOT 36   AP 54   TP 6.5   ALB 3.3*   GLOB 3.2     Recent Labs     01/18/20  1121   INR 1.0   PTP 10.5   APTT 21.6*      No results for input(s): PHI, PCO2I, PO2I, FIO2I in the last 72 hours. Recent Labs     01/18/20  1032   TROIQ <0.05       Hemodynamics:   PAP:   CO:     Wedge:   CI:     CVP:    SVR:       PVR:       Ventilator Settings:  Mode Rate Tidal Volume Pressure FiO2 PEEP                    Peak airway pressure:      Minute ventilation:          MEDS: Reviewed    Chest X-Ray:  CXR Results  (Last 48 hours)    None        CT HEAD WO CONT   Final Result   IMPRESSION: No evidence for hemorrhagic transformation or mass effect from the   known left M2 ischemic infarct            CTA CODE NEURO HEAD AND NECK W CONT   Final Result   IMPRESSION:   1. Acute left MCA bland infarct. 2. Left M2 middle division occlusion. Left A2 pericallosal artery occlusion. CT CODE NEURO PERF W CBF   Final Result   IMPRESSION:   1. Acute left MCA bland infarct. 2. Left M2 middle division occlusion. Left A2 pericallosal artery occlusion. CT CODE NEURO HEAD WO CONTRAST   Final Result   IMPRESSION: Acute thin right parafalcine and tentorial subdural hemorrhage with   small right occipital right scalp swelling and hematoma. The findings were called to Dr. Garrick Mason on 1/18/2020 at 10:01 by myself. [phone]            MRI BRAIN WO CONT    (Results Pending)       Assessment:     ICU Problems:  - L MCA/WALDEMAR ischemic stroke  - R subdural hemorrhage  - Hypercholesterolemia  - SHERINE    ICU Comprehensive Plan of Care:   Plans for this Shift:   1. Repeat CTH stable  2. PT/OT  3. SLP evaluation  4. Continue ASA per neuro recommendation, noted that patient does have SDH  5. OOBTC  6. Keep NPO  7. Discussed with neurology, ok with liberalizing BP, will continue neuro checks, if symptoms recur, restart phenylephrine with goal -160  8.  Plan for transfer out of ICU on 1/20/20    Multidisciplinary Rounds Completed:  No    ABCDEF Bundle/Checklist  Pain Medications: None  Target RASS: N/A  Sedation Medications: None  CAM-ICU:  Negative  Mobility: Good   PT/OT: PT consulted and on board, OT consulted and on board and Speech therapy consulted and on board   Restraints: None needed at this time  Discussed Plan of Care (goals of care): Yes  Addressed Code Status: Full Code    CARDIOVASCULAR  Cardiac Gtts: Phenylephrine  SBP Goal of: < 160 mmHg  MAP Goal of: > 65 mmHg  Transfusion Trigger (Hgb): <7 g/dL    RESPIRATORY  Vent Goals:   N/A  DVT Prophylaxis (if no, list reason): SCD's or Sequential Compression Device   SPO2 Goal: > 92%  Pulmonary toilet: Incentive Spirometry     GI/  Winters Catheter Present: Yes  GI Prophylaxis: Not at this time   Nutrition: No   IVFs: LR @75cc/hr  Bowel Movement: Yes  Bowel Regimen: None needed at this time  Insulin: Not needed at this time     ANTIBIOTICS  Antibiotics:  None    T/L/D  Tubes: None  Lines: Peripheral IV  Drains: Winters Catheter    SPECIAL EQUIPMENT  None    DISPOSITION  Stay in ICU    CRITICAL CARE CONSULTANT NOTE  I had a face to face encounter with the patient, reviewed and interpreted patient data including clinical events, labs, images, vital signs, I/O's, and examined patient. I have discussed the case and the plan and management of the patient's care with the consulting services, the bedside nurses and the respiratory therapist.      NOTE OF PERSONAL INVOLVEMENT IN CARE   This patient has a high probability of imminent, clinically significant deterioration, which requires the highest level of preparedness to intervene urgently. I participated in the decision-making and personally managed or directed the management of the following life and organ supporting interventions that required my frequent assessment to treat or prevent imminent deterioration. I personally spent 40 minutes of critical care time.   This is time spent at this critically ill patient's bedside actively involved in patient care as well as the coordination of care and discussions with the patient's family. This does not include any procedural time which has been billed separately.     Crys Kuo MD  Staff [address] [facility]  1/19/2020

## 2020-01-19 NOTE — PROGRESS NOTES
Neurocritical Care Brief Progress Note:    No neuro events overnight. Physical Exam:  Gen: NAD, calm, cooperative  Neuro: Alert, follows commands. Moderate expressive aphasia present. Affect normal. PERRL, 3 mm bilaterally. Mild right droop present. Miles spontaneously. Gait deferred. Skin: Warm, dry, color appropriate for ethnicity. Pt's sons at bedside were updated on plan of care.      Katie Youngblood NP  Neurocritical Care Nurse Practitioner  [phone]

## 2020-01-19 NOTE — PROGRESS NOTES
Problem: Mobility Impaired (Adult and Pediatric)  Goal: *Acute Goals and Plan of Care (Insert Text)  Description  FUNCTIONAL STATUS PRIOR TO ADMISSION: Pt was indep with mobility, no use of device. Per son, pt primarily ambulated household distances. HOME SUPPORT PRIOR TO ADMISSION: The patient lived with  (w/c bound per chart) but did not require assist. Three sons. Physical Therapy Goals  Initiated 1/19/2020  1. Patient will move from supine to sit and sit to supine  in bed with supervision/set-up within 7 day(s). 2.  Patient will transfer from bed to chair and chair to bed with supervision/set-up using the least restrictive device within 7 day(s). 3.  Patient will perform sit to stand with supervision/set-up within 7 day(s). 4.  Patient will ambulate with minimal assistance/contact guard assist for 150 feet with the least restrictive device within 7 day(s). 5.  Patient will ascend/descend at least 4 stairs with single handrail(s) with minimal assistance/contact guard assist within 7 day(s). Outcome: Not Met   PHYSICAL THERAPY EVALUATION  Patient: Lucio Siemens (45 y.o. female)  Date: 1/19/2020  Primary Diagnosis: Stroke (cerebrum) ([facility] [address] [I63.9]        Precautions: SBP goal 140-160,  Fall      ASSESSMENT  Based on the objective data described below, the patient presents with expressive aphasia, impaired balance, decreased indep with mobility as compared to baseline s/p admit for fall at home with L weakness and expressive aphasia. Imaging revealed R SDH and L CVA. Pt received in chair following OT session. Alert, following simple commands, attempting to verbalize at times. Son in room. Pt tolerated amb on unit with assist for balance; SBP within parameters with activity. Pt appeared fatigued at end of session but agreed to return to chair. Son notes pt primarily ambulating household distances at home.     Pt remains below functional baseline and will benefit from progressive mobility training in acute setting. Pending progress may require follow up IPR v home with family assist at d/c. Will continue to assess. Current Level of Function Impacting Discharge (mobility/balance): transfer min A, amb without device min A of 1-2,     Functional Outcome Measure: The patient scored 7/56 on the Eaton outcome measure which is indicative of high risk for fall. Other factors to consider for discharge: indep PLOF,  w/c bound per chart review; unsure level of support available from sons     Patient will benefit from skilled therapy intervention to address the above noted impairments. PLAN :  Recommendations and Planned Interventions: bed mobility training, transfer training, gait training, therapeutic exercises, neuromuscular re-education, patient and family training/education and therapeutic activities      Frequency/Duration: Patient will be followed by physical therapy:  5 times a week to address goals. Recommendation for discharge: (in order for the patient to meet his/her long term goals)  To be determined: IPR v home with assist and [facility] pending progress    This discharge recommendation:  Has not yet been discussed the attending provider and/or case management    IF patient discharges home will need the following DME: to be determined (TBD)         SUBJECTIVE:   Patient stated, \"I'm hungry\" with difficulty.     OBJECTIVE DATA SUMMARY:   HISTORY:    Past Medical History:   Diagnosis Date    Breast CA ([address]     Fluid retention in legs     Hypertension     Hypothyroidism      Past Surgical History:   Procedure Laterality Date    BREAST SURGERY PROCEDURE UNLISTED      Left lumpectomy    HX ORTHOPAEDIC  2007    left hip replacement       Personal factors and/or comorbidities impacting plan of care: h/o L IFRAH    Home Situation  Home Environment: Private residence  # Steps to Enter: 10  Rails to Enter: Yes  Hand Rails : Bilateral(too wide to hold both)  One/Two Story Residence: [facility] story  Living Alone: No  Support Systems: Spouse/Significant Other/Partner, Family member(s)  Current DME Used/Available at Home: [address], [place], straight, Walker, rolling, Shower chair  Tub or Shower Type: Tub/Shower combination    EXAMINATION/PRESENTATION/DECISION MAKING:   Critical Behavior:  Neurologic State: Alert  Orientation Level: Oriented to person, Oriented to place, Oriented to situation, Disoriented to time  Cognition: Appropriate for age attention/concentration, Follows commands     Hearing: Auditory  Auditory Impairment: None  Range Of Motion:  AROM: Within functional limits                       Strength:    Strength: Generally decreased, functional                    Tone & Sensation:   Tone: Normal              Sensation: Intact               Coordination:  Coordination: Within functional limits  Vision:      Functional Mobility:  Bed Mobility:              Transfers:  Sit to Stand: Minimum assistance  Stand to Sit: Contact guard assistance                       Balance:   Sitting: Intact  Standing: Impaired; Without support  Standing - Static: Good  Standing - Dynamic : Fair;Constant support  Ambulation/Gait Training:  Distance (ft): 75 Feet (ft)  Assistive Device: Gait belt(single HHA)  Ambulation - Level of Assistance: Minimal assistance;Assist x1;Assist x2(min A of 1-2 for balance)        Gait Abnormalities: Decreased step clearance; Path deviations              Speed/Sonya: Pace decreased (<100 feet/min)         Functional Measure:  Eaton Balance Test:    Sitting to Standin  Standing Unsupported: 0  Sitting with Back Unsupported: 4  Standing to Sittin  Transfers: 1  Standing Unsupported with Eyes Closed: 0  Standing Unsupported with Feet Together: 0  Reach Forward with Outstretched Arm: 0   Object: 0  Turn to Look Over Shoulders: 0  Turn 360 Degrees: 0  Alternate Foot on Step/Stool: 0  Standing Unsupported One Foot in Front: 0  Stand on One Le  Total:          56=Maximum possible score;   0-20=High fall risk  21-40=Moderate fall risk   41-56=Low fall risk               Physical Therapy Evaluation Charge Determination   History Examination Presentation Decision-Making   MEDIUM  Complexity : 1-2 comorbidities / personal factors will impact the outcome/ POC  MEDIUM Complexity : 3 Standardized tests and measures addressing body structure, function, activity limitation and / or participation in recreation  LOW Complexity : Stable, uncomplicated  LOW Complexity : FOTO score of       Based on the above components, the patient evaluation is determined to be of the following complexity level: LOW     Pain Rating:  Pt denied c/o pain    Activity Tolerance:   Fair  Please refer to the flowsheet for vital signs taken during this treatment. After treatment patient left in no apparent distress:   Sitting in chair, Call bell within reach and Caregiver / family present    COMMUNICATION/EDUCATION:   The patients plan of care was discussed with: Occupational Therapist and Registered Nurse. Fall prevention education was provided and the patient/caregiver indicated understanding., Patient/family have participated as able in goal setting and plan of care. and Patient/family agree to work toward stated goals and plan of care.     Thank you for this referral.  Mireya Luna, PT   Time Calculation: 29 mins

## 2020-01-20 PROBLEM — E03.9 HYPOTHYROIDISM: Status: ACTIVE | Noted: 2020-01-20

## 2020-01-20 PROBLEM — R47.01 EXPRESSIVE APHASIA: Status: ACTIVE | Noted: 2020-01-20

## 2020-01-20 PROBLEM — E78.5 HYPERLIPIDEMIA: Status: ACTIVE | Noted: 2020-01-20

## 2020-01-20 PROCEDURE — 74011250636 HC RX REV CODE- 250/636: Performed by: NURSE PRACTITIONER

## 2020-01-20 PROCEDURE — 97116 GAIT TRAINING THERAPY: CPT

## 2020-01-20 PROCEDURE — 65660000000 HC RM CCU STEPDOWN

## 2020-01-20 PROCEDURE — 74011250637 HC RX REV CODE- 250/637: Performed by: PSYCHIATRY & NEUROLOGY

## 2020-01-20 PROCEDURE — 97535 SELF CARE MNGMENT TRAINING: CPT

## 2020-01-20 PROCEDURE — 92523 SPEECH SOUND LANG COMPREHEN: CPT

## 2020-01-20 PROCEDURE — 92610 EVALUATE SWALLOWING FUNCTION: CPT

## 2020-01-20 PROCEDURE — 74011250637 HC RX REV CODE- 250/637: Performed by: NURSE PRACTITIONER

## 2020-01-20 RX ORDER — LEVOTHYROXINE SODIUM 100 UG/1
100 TABLET ORAL
Status: DISCONTINUED | OUTPATIENT
Start: 2020-01-20 | End: 2020-01-22 | Stop reason: HOSPADM

## 2020-01-20 RX ORDER — LANOLIN ALCOHOL/MO/W.PET/CERES
3 CREAM (GRAM) TOPICAL
Status: DISCONTINUED | OUTPATIENT
Start: 2020-01-20 | End: 2020-01-22 | Stop reason: HOSPADM

## 2020-01-20 RX ORDER — ESCITALOPRAM OXALATE 10 MG/1
20 TABLET ORAL DAILY
Status: CANCELLED | OUTPATIENT
Start: 2020-01-21

## 2020-01-20 RX ORDER — DOCUSATE SODIUM 100 MG/1
100 CAPSULE, LIQUID FILLED ORAL 2 TIMES DAILY
Status: DISCONTINUED | OUTPATIENT
Start: 2020-01-20 | End: 2020-01-22 | Stop reason: HOSPADM

## 2020-01-20 RX ORDER — MEMANTINE HYDROCHLORIDE 10 MG/1
10 TABLET ORAL 2 TIMES DAILY
Status: DISCONTINUED | OUTPATIENT
Start: 2020-01-20 | End: 2020-01-22 | Stop reason: HOSPADM

## 2020-01-20 RX ORDER — DONEPEZIL HYDROCHLORIDE 5 MG/1
5 TABLET, FILM COATED ORAL
Status: DISCONTINUED | OUTPATIENT
Start: 2020-01-20 | End: 2020-01-22 | Stop reason: HOSPADM

## 2020-01-20 RX ORDER — HYDRALAZINE HYDROCHLORIDE 10 MG/1
10 TABLET, FILM COATED ORAL
Status: DISCONTINUED | OUTPATIENT
Start: 2020-01-20 | End: 2020-01-22 | Stop reason: HOSPADM

## 2020-01-20 RX ORDER — LEVOTHYROXINE SODIUM 50 UG/1
50 TABLET ORAL
Status: DISCONTINUED | OUTPATIENT
Start: 2020-01-26 | End: 2020-01-22 | Stop reason: HOSPADM

## 2020-01-20 RX ORDER — ACETAMINOPHEN 325 MG/1
650 TABLET ORAL
Status: DISCONTINUED | OUTPATIENT
Start: 2020-01-20 | End: 2020-01-20

## 2020-01-20 RX ORDER — ACETAMINOPHEN 325 MG/1
650 TABLET ORAL
Status: DISCONTINUED | OUTPATIENT
Start: 2020-01-20 | End: 2020-01-22 | Stop reason: HOSPADM

## 2020-01-20 RX ORDER — ESCITALOPRAM OXALATE 10 MG/1
20 TABLET ORAL DAILY
Status: DISCONTINUED | OUTPATIENT
Start: 2020-01-20 | End: 2020-01-22 | Stop reason: HOSPADM

## 2020-01-20 RX ORDER — ONDANSETRON 2 MG/ML
4 INJECTION INTRAMUSCULAR; INTRAVENOUS
Status: DISCONTINUED | OUTPATIENT
Start: 2020-01-20 | End: 2020-01-22 | Stop reason: HOSPADM

## 2020-01-20 RX ORDER — ASPIRIN 325 MG
325 TABLET ORAL DAILY
Status: DISCONTINUED | OUTPATIENT
Start: 2020-01-20 | End: 2020-01-22 | Stop reason: HOSPADM

## 2020-01-20 RX ORDER — ATORVASTATIN CALCIUM 40 MG/1
40 TABLET, FILM COATED ORAL
Status: DISCONTINUED | OUTPATIENT
Start: 2020-01-20 | End: 2020-01-22 | Stop reason: HOSPADM

## 2020-01-20 RX ORDER — DONEPEZIL HYDROCHLORIDE 5 MG/1
5 TABLET, FILM COATED ORAL
Status: CANCELLED | OUTPATIENT
Start: 2020-01-20

## 2020-01-20 RX ADMIN — PHENYLEPHRINE HYDROCHLORIDE 10 MCG/MIN: 10 INJECTION INTRAVENOUS at 03:52

## 2020-01-20 RX ADMIN — DOCUSATE SODIUM 100 MG: 100 CAPSULE, LIQUID FILLED ORAL at 11:26

## 2020-01-20 RX ADMIN — DONEPEZIL HYDROCHLORIDE 5 MG: 5 TABLET, FILM COATED ORAL at 21:45

## 2020-01-20 RX ADMIN — LEVOTHYROXINE SODIUM 100 MCG: 50 TABLET ORAL at 11:30

## 2020-01-20 RX ADMIN — ASPIRIN 325 MG ORAL TABLET 325 MG: 325 PILL ORAL at 11:25

## 2020-01-20 RX ADMIN — MEMANTINE HYDROCHLORIDE 10 MG: 10 TABLET ORAL at 18:19

## 2020-01-20 RX ADMIN — DOCUSATE SODIUM 100 MG: 100 CAPSULE, LIQUID FILLED ORAL at 18:19

## 2020-01-20 RX ADMIN — ATORVASTATIN CALCIUM 40 MG: 40 TABLET, FILM COATED ORAL at 21:45

## 2020-01-20 RX ADMIN — ESCITALOPRAM OXALATE 20 MG: 10 TABLET ORAL at 11:25

## 2020-01-20 NOTE — PROGRESS NOTES
Problem: Mobility Impaired (Adult and Pediatric)  Goal: *Acute Goals and Plan of Care (Insert Text)  Description  FUNCTIONAL STATUS PRIOR TO ADMISSION: Pt was indep with mobility, no use of device. Per son, pt primarily ambulated household distances. HOME SUPPORT PRIOR TO ADMISSION: The patient lived with  but did not require assist. Local children. Physical Therapy Goals  Initiated 1/19/2020  1. Patient will move from supine to sit and sit to supine  in bed with supervision/set-up within 7 day(s). 2.  Patient will transfer from bed to chair and chair to bed with supervision/set-up using the least restrictive device within 7 day(s). 3.  Patient will perform sit to stand with supervision/set-up within 7 day(s). 4.  Patient will ambulate with minimal assistance/contact guard assist for 150 feet with the least restrictive device within 7 day(s). 5.  Patient will ascend/descend at least 4 stairs with single handrail(s) with minimal assistance/contact guard assist within 7 day(s). Outcome: Progressing Towards Goal  PHYSICAL THERAPY TREATMENT  Patient: Zahra Gaines (82 y.o. female)  Date: 1/20/2020  Diagnosis: Stroke (cerebrum) [facility]) [I63.9]   <principal problem not specified>       Precautions: Fall  Chart, physical therapy assessment, plan of care and goals were reviewed. ASSESSMENT  Patient continues with skilled PT services and is progressing towards goals. Pt received lying in bed, agreeable to work with therapy. Pt transferred supine<>sit with CGA and sit<>stand with CGA. Pt ambulated holding IV pole and demonstrated overall unsteadiness and impaired balance requiring Sabas to correct. She continues to present with expressive aphasia and impaired command following. Recommending IP rehab upon discharge.          Current Level of Function Impacting Discharge (mobility/balance): Sabas for ambulation     Other factors to consider for discharge: caregiver for wheelchair bound discharge, expressive aphasia, fall risk          PLAN :  Patient continues to benefit from skilled intervention to address the above impairments. Continue treatment per established plan of care. to address goals. Recommendation for discharge: (in order for the patient to meet his/her long term goals)  Therapy 3 hours per day 5-7 days per week    This discharge recommendation:  Has been made in collaboration with the attending provider and/or case management    IF patient discharges home will need the following DME: none       SUBJECTIVE:   Patient attempting to verbalize throughout entire session but incomprehensible - expressive aphasia     OBJECTIVE DATA SUMMARY:   Critical Behavior:  Neurologic State: Alert  Orientation Level: Unable to verbalize(expressive aphasia)  Cognition: Appropriate for age attention/concentration, Follows commands(simple commands)  Safety/Judgement: Awareness of environment, Fall prevention  Functional Mobility Training:  Bed Mobility:  Supine to Sit: Contact guard assistance  Scooting: Contact guard assistance    Transfers:  Sit to Stand: Contact guard assistance  Stand to Sit: Contact guard assistance    Balance:  Sitting: Intact  Standing: Impaired; With support  Standing - Static: Fair;Constant support  Standing - Dynamic : Fair;Constant support    Ambulation/Gait Training:  Distance (ft): 75 Feet (ft)  Assistive Device: Gait belt(IV pole for support)  Ambulation - Level of Assistance: Minimal assistance  Gait Abnormalities: Decreased step clearance; Path deviations  Speed/Sonya: Shuffled; Slow      Activity Tolerance:   Fair- fatigued quickly, VSS  Please refer to the flowsheet for vital signs taken during this treatment.     After treatment patient left in no apparent distress:   Supine in bed, Call bell within reach, and Side rails x 3    COMMUNICATION/COLLABORATION:   The patients plan of care was discussed with: Occupational Therapist and Registered Nurse    Jono Mckeon, PT, DPT Time Calculation: 13 mins

## 2020-01-20 NOTE — PROGRESS NOTES
[facility] Adult  Hospitalist Group     ICU Transfer/Accept Summary   PATIENT ID: Chepe Aranda  MRN: [id number]   YOB: 1935    PRIMARY CARE PROVIDER: Nils Nguyen MD   DATE OF ADMISSION: 1/18/2020  9:46 AM    ATTENDING PHYSICIAN: Nuno Paul NP  CONSULTATIONS:   IP CONSULT TO NEUROINTERVENTIONAL SURGERY  IP CONSULT TO INTENSIVIST  IP CONSULT TO NEUROLOGY    PROCEDURES/SURGERIES:   * No surgery found *    REASON FOR ADMISSION: [address] [place] [address] Road PROBLEM LIST:  Patient Active Problem List   Diagnosis Code    UTI (urinary tract infection) N39.0    Stroke (cerebrum) ([facility] [address] I63.9       This [place] [address] [place]  DATE OF TRANSFER: 1/20/2020       Brief HPI and Hospital Course:      From H&P 1/18/2020:  \"82 yo female living independently with  last know well 1/18/20 @ 1045. Jennyelen Woodruffmiguel heard patient fall this morning around 9 am and called EMS. Pt brought to [facility] ED and stroke code called. When I   Saw her in the ER she had Left gaze preference , head also tilted somewhat to the left, with paresis  Of the right upper extremity, sometimes says ok vs nothing to most questions. Perfusion study shows infarcted left MCA  territory distal to thrombus; Perfusion defect also noted in WALDEMAR territory corresponding with a small area of ischemia with small thrombus noted in WALDEMAR pericallosal branch; filling distal to both emboli noted. The patient will be admitted to the ICU for neuro checks and close observation. And optimization of blood pressure. \"    Repeat Head CT 1/19/2020:  FINDINGS:  The ventricles and sulci are normal in size, shape and configuration and  midline. There is no significant white matter disease. There is no intracranial  hemorrhage, extra-axial collection, mass, mass effect or midline shift. The  basilar cisterns are open. No acute infarct is identified. The bone windows  demonstrate no abnormalities.  The visualized portions of the paranasal sinuses  and mastoid air cells are clear. IMPRESSION: No evidence for hemorrhagic transformation or mass effect from the  known left M2 ischemic infarct    MRI Brain 1/19/2020:  IMPRESSION:  Cortical infarction overlying the left frontal and left temporal lobe. Punctate  foci of parenchymal infarction in the anterior superior left frontal lobe and  periventricular left corona radiata. There is no superimposed hemorrhage,  midline shift or mass effect. There is moderate to severe temporal predominant cerebral atrophy. 2D Echo 1/19/2020:  Result status: Final result   · Normal cavity size, wall thickness and systolic function (ejection fraction normal). Estimated left ventricular ejection fraction is 55 - 60%. Mild (grade 1) left ventricular diastolic dysfunction. · Aortic valve sclerosis. · Mitral valve non-specific thickening. · Mild tricuspid valve regurgitation is present.        Patient did not receive TPA as last known well was not established. Patient did not receive neuro IR intervention. Patient is rapidly improving today. Her expressive aphasia is quite minimal.  Family at bedside including 2 sons. Patient had statin listed as an allergy without reaction noted. Discussed with patient and she does not recall. Patient's family now at bedside including 2 sons who states that her side effect was leg cramps and weakness. Discussed with patient and family importance of starting a statin as the risk of not starting it are very high including recurrent stroke, heart attack, death. Patient family agree that they would prefer to go ahead and restart statin now and if patient develops leg cramps, can address that with ropinirole or whatever is appropriate at the time. Also discussed with patient and family her A1c of 5.8, borderline diabetes. Discussed diet/exercise/lifestyle changes. Patient reports that her grandparents had diabetes as well as her mother.   Patient's with equal strength at this time, moving herself up in bed. No residual facial droop noted. Of note, patient lives alone who has Parkinson's disease. He is currently receiving home health physical therapy for that. Discussed possible need to consider privately hiring someone to be at the home to assist and supervise versus looking into assisted living. Will consult case management to give patient information on local assisted living facilities available. Patient noted to have DNR pink sheet filled out but no order in the computer yet. Discussed with patient and family who both confirm that patient has a advanced directive at home (this has been requested to be brought in) and that she does want to be a DO NOT RESUSCITATE. She does not have a DNR outside of here. Patient currently is alert and oriented x3, however, she has a history of dementia and does take Namenda and Aricept, she has agreed to have her son's sign for her DNR. Of note, patient's  has Parkinson's and is unclear if he has dementia or not. He is also not present. At this time patient denies headache, dizziness, chest pain, shortness of breath, abdominal pain, nausea vomiting or diarrhea, paresthesias. She is somewhat frustrated with her expressive aphasia and some weakness of her right arm, however, she is able to use it.     Assessment and Plan:  CVA  -Left MCA and WALDEMAR territories  -R arm weakness/expressive aphasia - improving  -No TPA or IR intervention  -MRI +infarct, no bleed; no bleed on repeat head CT 1/19/20  -Echo w/ 55-60% EF, mitral valve thickening, aortic valve sclerosis and tricuspid regurg; mild (grade 1) diastolic dysfunction  -Cont ASA per neuro  -Lipid panel elevated; start lipitor 40mg PO daily  A1c 5.8; teaching done with patient and family regarding borderline diabetes  Seen by speech therapy, can resume regular diet and thin liquids   PT/OTrecommend reevaluation in a.m. as patient is rapidly improving; may be able to go home with home health PT tomorrow    S/p fall w/ small R occipital scalp swelling and hematoma  -Resolved    Acute thin right parafalcine and tentorial hemorrhage  -Resolved per MRI/repeat head CT  -Continue ASA per neuro    Hyperlipidemia  -likely cause of stroke  No real statin allergy-pt/family ok with starting statin  -Start lipitor 40mg PO daily, continue ASA  -Monitor for leg cramps (s/e of statin prev); consider ropinirole    Hx of mild dementia  -Stable, A&Ox3 today  -Cont namenda, aricept    Hx of ongoing tobacco use  -Counseled for cessation    Abnormal UA  -Asymptomatic for UTI; UA shows large leukocytes, 1+ bacteria  -Afebrile, no leukocytosis; no abx started  -urine culture pending    Hx of hypothyroid  -Stable, continue home levothyroxine    Code Status: DNR  DVTppx: SCDs, ASA  Diet: Cardiac Regular  Function POA: independently ambulatory and lived at home alone with  who has parkinsons  Discharge plans: [facility] PT vs IP rehab pending further PT/OT evaluation             Results for Josefina Carlos (MRN [id number]) as of 1/20/2020 17:12   Ref. Range 1/19/2020 03:56   Triglyceride Latest Ref Range: <150 MG/ (H)   Cholesterol, total Latest Ref Range: <200 MG/ (H)   HDL Cholesterol Latest Units: MG/DL 30   CHOL/HDL Ratio Latest Ref Range: 0.0 - 5.0   8.4 (H)   VLDL, calculated Latest Units: MG/DL 53   LDL, calculated Latest Ref Range: 0 - 100 MG/ (H)   Hemoglobin A1c, (calculated) Latest Ref Range: 4.0 - 5.6 % 5.8 (H)   Est. average glucose Latest Units: mg/dL 120         PHYSICAL EXAMINATION:  Visit Vitals  /51   Pulse 60   Temp 97.7 °F (36.5 °C)   Resp 11   Ht 5' 6\" (1.676 m)   Wt 56.8 kg (125 lb 3.5 oz)   SpO2 93%   Breastfeeding No   BMI 20.21 kg/m²       General:          Alert, cooperative, no distress, appears stated age  HEENT:           Atraumatic, MMM            Lungs:             Clear to auscultation bilaterally. No Wheezing or Rhonchi. No rales.   Heart:              Regular rhythm,  No  murmur   No edema  Abdomen:       Soft, non-tender. Not distended. Bowel sounds normal  Extremities:     No cyanosis. No clubbing,  +2 distal pulses  Skin:                Not pale. Not Jaundiced  No rashes   Psych:             Not anxious or agitated.   Neurologic:      Alert, moves all extremities, +slight R sided weakness, currently oriented X 3 w/ hx of dementia; +expressive aphasia improving    CODE STATUS:   Full Code   X DNR    Partial    Comfort Care     Signed:   Coby Chambers NP  1/20/2020  4:27 PM

## 2020-01-20 NOTE — PROGRESS NOTES
Bedside and Verbal shift change report given to Brittani De Leon  (oncoming nurse) by Shannen Carter (offgoing nurse). Report included the following information SBAR, Intake/Output and Cardiac Rhythm NSR     Bedside and Verbal shift change report given to Paola  (oncoming nurse) by Brittani De Leon (offgoing nurse). Report included the following information SBAR and Cardiac Rhythm NSR.

## 2020-01-20 NOTE — PROGRESS NOTES
[facility]    ICU TEAM Progress Note    Name: Fatuma Pan   : 1935   MRN: [id number]   Date: 2020      Assessment:     ICU Problems:  Left MCA thromboembolic stroke  Right subdural hemorrhage  Mild dementia  Hypertension  Hypothyroidism    ICU Comprehensive Plan of Care:   Plans for this Shift:   1. Continue PT/OT and speech therapy  2. Advance diet as tolerated. Passed swallow test.  3. Urine culture  4. Continue medical management. 5. Continue Aspirin. 6. Continue docusate. 7. Continue levothyroxine. 8. Continue Namenda. Subjective:   Progress Note: 2020      Reason for ICU Admission: Left MCA stroke     Overnight Events: No acute events. Active Problem List:     Problem List  Date Reviewed: 2018          Codes Class    Stroke (cerebrum) (HCC) ICD-10-CM: I63.9  ICD-9-CM: 434.91         UTI (urinary tract infection) ICD-10-CM: N39.0  ICD-9-CM: 599.0               Past Medical History:      has a past medical history of Breast CA ([facility] [address] Fluid retention in legs, Hypertension, and Hypothyroidism. Past Surgical History:      has a past surgical history that includes hx orthopaedic () and pr breast surgery procedure unlisted. Home Medications:     Prior to Admission medications    Medication Sig Start Date End Date Taking? Authorizing Provider   levothyroxine (SYNTHROID) 50 mcg tablet Take 50 mcg by mouth every . Take 100 mcg by mouth Monday through Saturday. Take half-tablet (50 mcg) by mouth on    Yes Provider, Historical   donepeziL (ARICEPT) 5 mg tablet Take 5 mg by mouth nightly. Yes Provider, Historical   memantine ER (NAMENDA XR) 28 mg capsule Take 28 mg by mouth daily. Yes Provider, Historical   OTHER 1 Tab daily. Swiss Zuly Herbal Laxative: Take 1 tablet by mouth daily   Indications: constipation   Yes Provider, Historical   cranberry 500 mg capsule Take 500 mg by mouth daily.  18  Yes Provider, Historical multivit-min/iron/folic/lutein (CENTRUM SILVER WOMEN PO) Take 1,500 mg by mouth daily. Yes Timmy Alejandre MD   escitalopram oxalate (LEXAPRO) 20 mg tablet Take 20 mg by mouth daily. Yes Provider, Historical   levothyroxine (SYNTHROID) 100 mcg tablet Take 100 mcg by mouth six (6) days a week. Take 100 mcg by mouth Monday through Saturday. Take half-tablet (50 mcg) by mouth on    Yes Provider, Historical       Allergies/Social/Family History: Allergies   Allergen Reactions    Sulfamethoxazole-Trimethoprim Hives     Treated with diphenhydramine    Darvocet A500 [Propoxyphene N-Acetaminophen] Itching    Percocet [Oxycodone-Acetaminophen] Itching    Rocephin [Ceftriaxone] Swelling     Questionable allergy, had facial swelling morning after IV administration    Statins-Hmg-Coa Reductase Inhibitors Unknown (comments)      Social History     Tobacco Use    Smoking status: Current Every Day Smoker    Smokeless tobacco: Never Used   Substance Use Topics    Alcohol use: Yes     Alcohol/week: 0.0 standard drinks      History reviewed. No pertinent family history. Review of Systems:     Review of systems not obtained due to patient factors. Objective:   Vital Signs:  Visit Vitals  BP (!) 134/94   Pulse 60   Temp 97.7 °F (36.5 °C)   Resp 12   Ht 5' 6\" (1.676 m)   Wt 56.8 kg (125 lb 3.5 oz)   SpO2 91%   Breastfeeding No   BMI 20.21 kg/m²      O2 Device: Room air Temp (24hrs), Av.9 °F (36.6 °C), Min:97.6 °F (36.4 °C), Max:98.1 °F (36.7 °C)     Intake/Output:     Intake/Output Summary (Last 24 hours) at 2020 1438  Last data filed at 2020 1200  Gross per 24 hour   Intake 1106.79 ml   Output 1650 ml   Net -543.21 ml       Physical Exam:    General:   Awake, cooperative, well nourished, well developed, appears stated age   Eyes:  Sclera anicteric. Pupils equally round and reactive to light.    Mouth/Throat: Mucous membranes normal, oral pharynx clear   Neck: Supple   Lungs:   Clear to auscultation bilaterally, good effort   CV:  Regular rate and rhythm, no murmur, click, rub or gallop   Abdomen:   Soft, non-tender. Bowel sounds normal. Non-distended. Extremities: No cyanosis or edema. Skin: Skin color, texture, turgor normal. No rash or lesions. Musculoskeletal: No swelling or deformity. Lines/Devices:  Intact, no erythema, drainage or tenderness   Psych/Neuro: Expressive aphasia. Seems to understand what is being said to her. Follows commands. LABS AND  DATA: Personally reviewed  Recent Labs     01/18/20  1032   WBC 7.5   HGB 11.6   HCT 36.4   *     Recent Labs     01/18/20  1032      K 4.6      CO2 27   BUN 25*   CREA 1.15*   GLU 98   CA 8.5     Recent Labs     01/18/20  1032   SGOT 36   AP 54   TP 6.5   ALB 3.3*   GLOB 3.2     Recent Labs     01/18/20  1121   INR 1.0   PTP 10.5   APTT 21.6*     Recent Labs     01/18/20  [address] <0.05     MEDS: Reviewed    Multidisciplinary Rounds Completed: Yes    ABCDEF Bundle/Checklist  Pain Medications: None  Target RASS: 0 - Alert & Calm - Spontaneously pays attention to caregiver  Sedation Medications: None  CAM-ICU:  Unable to assess. Mobility: Up with assistance  PT/OT: PT consulted and on board, OT consulted and on board and Speech therapy consulted and on board   Restraints: None needed at this time  Discussed Plan of Care (goals of care): Yes  Addressed Code Status: Full Code    CARDIOVASCULAR  Cardiac Gtts: None  SBP Goal of: > 90 mmHg  MAP Goal of: > 65 mmHg  Transfusion Trigger (Hgb): <7 g/dL    RESPIRATORY  Vent Goals:   N/A  DVT Prophylaxis (if no, list reason): SCD's or Sequential Compression Device   SPO2 Goal: > 92%  Pulmonary toilet: Incentive Spirometry     GI/  Winters Catheter Present: Yes  GI Prophylaxis: Not at this time   Nutrition: Yes Regular, passed swallow.   IVFs: Yes, LR at 75 cc/hr  Bowel Movement: No  Bowel Regimen: MiraLax and Docusate (Colace)  Insulin: N/A    T/L/D  Tubes: None  Lines: Peripheral IV  Drains: Winters Catheter    SPECIAL EQUIPMENT  None    DISPOSITION  Transfer to non-ICU bed    CRITICAL CARE CONSULTANT NOTE  I had a face to face encounter with the patient, reviewed and interpreted patient data including clinical events, labs, images, vital signs, I/O's, and examined patient. I have discussed the case and the plan and management of the patient's care with the consulting services, the bedside nurses and the respiratory therapist.      NOTE OF PERSONAL INVOLVEMENT IN CARE   This patient has a high probability of imminent, clinically significant deterioration, which requires the highest level of preparedness to intervene urgently. I participated in the decision-making and personally managed or directed the management of the following life and organ supporting interventions that required my frequent assessment to treat or prevent imminent deterioration. I personally spent 40 minutes of critical care time. This is time spent at this critically ill patient's bedside actively involved in patient care as well as the coordination of care and discussions with the patient's family. This does not include any procedural time which has been billed separately.     ABDIRAHMAN Mora-BC, MSN  [facility]

## 2020-01-20 NOTE — ACP (ADVANCE CARE PLANNING)
Advance Care Planning Note    Name: Erlin Reed  YOB: 1935  MRN: [id number]  Admission Date: 1/18/2020  9:46 AM    Date of discussion: 1/20/2020    Active Diagnoses:    Hospital Problems  Date Reviewed: 7/19/2018          Codes Class Noted POA    Stroke (cerebrum) [facility]) ICD-10-CM: I63.9  ICD-9-CM: 434.91  1/18/2020 Unknown               These active diagnoses are of sufficient risk that focused discussion on advance care planning is indicated in order to allow the patient to thoughtfully consider personal goals of care, and if situations arise that prevent the ability to personally give input, to ensure appropriate representation of their personal desires for different levels and aggressiveness of care. Discussion:     Persons present and participating in discussion: Lakshmi Campbell NP, two of patient's sons, and several other family members    Discussion: Discussion initiated due to patient's age and health condition but also because patient had no CODE STATUS order in the computer. Patient is alert and oriented x3 today with some expressive aphasia, which has significantly improved today. It takes her some time to get out her thoughts, but she is able to do so effectively. Patient's 2 sons and other family members are also present. Patient and sons agree that patient does have an advanced directive, however, they do not have a copy of it at this time. It was previously requested to be brought in. Patient son confirms that patient does want to be a DNR. Patient also confirms this. At this time patient lives alone at home with her  who has Parkinson's and receives home health physical therapy. Discussed importance of considering hiring a personal caregiver or transitioning into assisted living type of facility. Sons have been thinking about that for a while they state.   Patient is not a fan of this idea but does acknowledge that she will need help at home. Discussed also patient's very elevated cholesterol levels and her reaction to statin, which is leg cramping/some weakness. Discussed risks and benefits of statin and not having a statin including recurrent stroke, heart attack, death. At this time patient and family agree that the benefits of taking a statin outweigh the possibility of cramping and weakness in her legs which can be treated separately and are agreeable to start statin. Will change patient's CODE STATUS to DNR. [place] sheet is already filled out by ICU doc. We will go ahead and fill out DDNR with family now. Time Spent:     Total time spent face-to-face in education and discussion: 20 minutes.      Ebony Forde NP  1/20/2020  5:47 PM

## 2020-01-20 NOTE — PROGRESS NOTES
SLP Contact Note    SLP evaluation complete. Pt cleared for regular diet/thin liquids. Pt presents with mild receptive and moderate expressive non-fluent aphasia most consistent with a Broca's aphasia at this time. Full note to follow.       Thank you,  ALLEN AcostaEd, [id number] [facility] [place]  Speech-Language Pathologist

## 2020-01-20 NOTE — PROGRESS NOTES
Problem: Self Care Deficits Care Plan (Adult)  Goal: *Acute Goals and Plan of Care (Insert Text)  Description  FUNCTIONAL STATUS PRIOR TO ADMISSION: Patient was independent and active without use of DME. Has a cane and RW but does not use them regularly. HOME SUPPORT: The patient lived with  but did not require assist. Patient has good supportive family in area. Occupational Therapy Goals  Initiated 1/19/2020   1. Patient will perform grooming standing at sink for 10 minutes with supervision/set-up within 7 day(s). 2.  Patient will perform bathing using most appropriate DME with supervision/set-up within 7 day(s). 3.  Patient will perform lower body dressing with supervision/set-up within 7 day(s). 4.  Patient will perform toilet transfers with supervision/set-up within 7 day(s). 5.  Patient will perform all aspects of toileting with supervision/set-up within 7 day(s). 6.  Patient will participate in upper extremity therapeutic exercise/activities with supervision/set-up for 5 minutes within 7 day(s). 7.  Patient will utilize energy conservation techniques during functional activities with verbal cues within 7 day(s). Outcome: Progressing Towards Goal    OCCUPATIONAL THERAPY TREATMENT  Patient: Juan Buenrostro (80 y.o. female)  Date: 1/20/2020  Diagnosis: Stroke (cerebrum) [facility]) [I63.9]   <principal problem not specified>       Precautions: Fall  Chart, occupational therapy assessment, plan of care, and goals were reviewed. ASSESSMENT  Patient continues with skilled OT services and is progressing towards goals. Patient received sitting EOB with RN, preparing to transfer to toilet for toileting. Patient HHAx1 for functional mobility in room. Patient required up to min A for toileting 2* balance. Patient stood at sink with CGA to brush teeth and then required min A to change her gown 2* lines.  Patient continues to be limited by impaired balance, expressive aphasia, impaired sequencing and increased need for processing for complex instructions. Patient left sitting in chair with call bell in reach, family bedside, RN present and all needs met. Will continue to follow, recommend IPR once medically cleared for D/C. Current Level of Function Impacting Discharge (ADLs): up to min A for ADLs    Other factors to consider for discharge: IND at baseline, expressive aphasia - concern for safety          PLAN :  Patient continues to benefit from skilled intervention to address the above impairments. Continue treatment per established plan of care. to address goals. Recommend with staff: Recommend with nursing patient to complete as able in order to maintain strength, endurance and independence: ADLs with supervision/setup, OOB to chair 3x/day and mobilizing to the bathroom for toileting with 1 assist. Thank you for your assistance. Recommend next OT session: bathing routine at sink    Recommendation for discharge: (in order for the patient to meet his/her long term goals)  Therapy 3 hours per day 5-7 days per week    This discharge recommendation:  Has been made in collaboration with the attending provider and/or case management    IF patient discharges home will need the following DME: shower chair       SUBJECTIVE:   Patient stated My daughter in law is an. .. (then pointed to tag with \"OT\" on it.     OBJECTIVE DATA SUMMARY:   Cognitive/Behavioral Status:  Neurologic State: Alert  Orientation Level: Unable to verbalize(expressive aphasia)  Cognition: Appropriate for age attention/concentration; Follows commands(simple commands)  Perception: Appears intact  Perseveration: No perseveration noted  Safety/Judgement: Awareness of environment; Fall prevention    Functional Mobility and Transfers for ADLs:  Bed Mobility:   NT - received EOB    Transfers:  Sit to Stand: Contact guard assistance  Functional Transfers  Toilet Transfer : Minimum assistance  Cues: Physical assistance;Verbal cues provided  Adaptive Equipment: Grab bars       Balance:  Sitting: Intact  Standing: Impaired; With support  Standing - Static: Good;Constant support  Standing - Dynamic : Fair;Constant support    ADL Intervention:    Grooming  Position Performed: Standing  Brushing Teeth: Contact guard assistance    Upper Body [address]: Minimum  assistance(min A 2* lines and leads)    Toileting  Bladder Hygiene: Total assistance (dependent)  Bowel Hygiene: Supervision  Clothing Management: Minimum assistance  Cues: Tactile cues provided;Verbal cues provided  Adaptive Equipment: Grab bars    Cognitive Retraining  Safety/Judgement: Awareness of environment; Fall prevention      Activity Tolerance:   Good and requires rest breaks  Please refer to the flowsheet for vital signs taken during this treatment.     After treatment patient left in no apparent distress:   Sitting in chair, Call bell within reach, Caregiver / family present, and RN aware     COMMUNICATION/COLLABORATION:   The patients plan of care was discussed with: Physical Therapist, Registered Nurse, and     Riley Sheffield OT  Time Calculation: 23 mins

## 2020-01-20 NOTE — PROGRESS NOTES
0730: Bedside and Verbal shift change report given to Beryl Ravi RN (oncoming nurse) by Claudell Catching, RN (offgoing nurse). Report included the following information SBAR, Kardex, Intake/Output, MAR, Recent Results, Cardiac Rhythm NSR/Sinus Bradycardia, Alarm Parameters  and Dual Neuro Assessment. 1805: TRANSFER - OUT REPORT:    Verbal report given to Rolo Cantu RN on Pearla Cart  being transferred to [facility]U, Room 676 for routine progression of care       Report consisted of patients Situation, Background, Assessment and   Recommendations(SBAR). Information from the following report(s) SBAR, Kardex, Intake/Output, MAR, Recent Results, Cardiac Rhythm NSR, Alarm Parameters  and Dual Neuro Assessment was reviewed with the receiving nurse. Lines:   Peripheral IV 01/19/20 Anterior;Distal;Left Forearm (Active)   Site Assessment Clean, dry, & intact 1/20/2020  4:00 PM   Phlebitis Assessment 0 1/20/2020  4:00 PM   Infiltration Assessment 0 1/20/2020  4:00 PM   Dressing Status Clean, dry, & intact 1/20/2020  4:00 PM   Dressing Type Transparent;Tape 1/20/2020  4:00 PM   Hub Color/Line Status Pink; Infusing 1/20/2020  4:00 PM   Action Taken Open ports on tubing capped 1/20/2020  4:00 PM   Alcohol Cap Used Yes 1/20/2020  4:00 PM        Opportunity for questions and clarification was provided.       Patient transported with:   Monitor  Registered Nurse  Tech

## 2020-01-21 LAB
ANION GAP SERPL CALC-SCNC: 6 MMOL/L (ref 5–15)
BASOPHILS # BLD: 0 K/UL (ref 0–0.1)
BASOPHILS NFR BLD: 0 % (ref 0–1)
BUN SERPL-MCNC: 20 MG/DL (ref 6–20)
BUN/CREAT SERPL: 21 (ref 12–20)
CALCIUM SERPL-MCNC: 8.8 MG/DL (ref 8.5–10.1)
CHLORIDE SERPL-SCNC: 111 MMOL/L (ref 97–108)
CO2 SERPL-SCNC: 25 MMOL/L (ref 21–32)
CREAT SERPL-MCNC: 0.96 MG/DL (ref 0.55–1.02)
DIFFERENTIAL METHOD BLD: ABNORMAL
EOSINOPHIL # BLD: 0.7 K/UL (ref 0–0.4)
EOSINOPHIL NFR BLD: 8 % (ref 0–7)
ERYTHROCYTE [DISTWIDTH] IN BLOOD BY AUTOMATED COUNT: 13.7 % (ref 11.5–14.5)
GLUCOSE SERPL-MCNC: 99 MG/DL (ref 65–100)
HCT VFR BLD AUTO: 35.6 % (ref 35–47)
HGB BLD-MCNC: 11.9 G/DL (ref 11.5–16)
IMM GRANULOCYTES # BLD AUTO: 0 K/UL (ref 0–0.04)
IMM GRANULOCYTES NFR BLD AUTO: 0 % (ref 0–0.5)
LYMPHOCYTES # BLD: 3.3 K/UL (ref 0.8–3.5)
LYMPHOCYTES NFR BLD: 34 % (ref 12–49)
MCH RBC QN AUTO: 34.7 PG (ref 26–34)
MCHC RBC AUTO-ENTMCNC: 33.4 G/DL (ref 30–36.5)
MCV RBC AUTO: 103.8 FL (ref 80–99)
MONOCYTES # BLD: 1.1 K/UL (ref 0–1)
MONOCYTES NFR BLD: 12 % (ref 5–13)
NEUTS SEG # BLD: 4.5 K/UL (ref 1.8–8)
NEUTS SEG NFR BLD: 46 % (ref 32–75)
NRBC # BLD: 0 K/UL (ref 0–0.01)
NRBC BLD-RTO: 0 PER 100 WBC
PLATELET # BLD AUTO: 120 K/UL (ref 150–400)
PMV BLD AUTO: 12.6 FL (ref 8.9–12.9)
POTASSIUM SERPL-SCNC: 3.6 MMOL/L (ref 3.5–5.1)
RBC # BLD AUTO: 3.43 M/UL (ref 3.8–5.2)
SODIUM SERPL-SCNC: 142 MMOL/L (ref 136–145)
WBC # BLD AUTO: 9.7 K/UL (ref 3.6–11)

## 2020-01-21 PROCEDURE — 92507 TX SP LANG VOICE COMM INDIV: CPT

## 2020-01-21 PROCEDURE — 65270000029 HC RM PRIVATE

## 2020-01-21 PROCEDURE — 92526 ORAL FUNCTION THERAPY: CPT

## 2020-01-21 PROCEDURE — 85025 COMPLETE CBC W/AUTO DIFF WBC: CPT

## 2020-01-21 PROCEDURE — 74011250637 HC RX REV CODE- 250/637: Performed by: PSYCHIATRY & NEUROLOGY

## 2020-01-21 PROCEDURE — 74011250637 HC RX REV CODE- 250/637: Performed by: NURSE PRACTITIONER

## 2020-01-21 PROCEDURE — 80048 BASIC METABOLIC PNL TOTAL CA: CPT

## 2020-01-21 PROCEDURE — 36415 COLL VENOUS BLD VENIPUNCTURE: CPT

## 2020-01-21 PROCEDURE — 74011250636 HC RX REV CODE- 250/636: Performed by: FAMILY MEDICINE

## 2020-01-21 PROCEDURE — 97535 SELF CARE MNGMENT TRAINING: CPT

## 2020-01-21 RX ORDER — LEVOFLOXACIN 5 MG/ML
500 INJECTION, SOLUTION INTRAVENOUS EVERY 24 HOURS
Status: DISCONTINUED | OUTPATIENT
Start: 2020-01-21 | End: 2020-01-22

## 2020-01-21 RX ADMIN — DOCUSATE SODIUM 100 MG: 100 CAPSULE, LIQUID FILLED ORAL at 08:45

## 2020-01-21 RX ADMIN — LEVOTHYROXINE SODIUM 100 MCG: 50 TABLET ORAL at 07:28

## 2020-01-21 RX ADMIN — MEMANTINE HYDROCHLORIDE 10 MG: 10 TABLET ORAL at 18:00

## 2020-01-21 RX ADMIN — ASPIRIN 325 MG ORAL TABLET 325 MG: 325 PILL ORAL at 08:44

## 2020-01-21 RX ADMIN — MEMANTINE HYDROCHLORIDE 10 MG: 10 TABLET ORAL at 08:45

## 2020-01-21 RX ADMIN — LEVOFLOXACIN 500 MG: 5 INJECTION, SOLUTION INTRAVENOUS at 13:45

## 2020-01-21 RX ADMIN — ESCITALOPRAM OXALATE 20 MG: 10 TABLET ORAL at 08:45

## 2020-01-21 RX ADMIN — DOCUSATE SODIUM 100 MG: 100 CAPSULE, LIQUID FILLED ORAL at 18:00

## 2020-01-21 RX ADMIN — DONEPEZIL HYDROCHLORIDE 5 MG: 5 TABLET, FILM COATED ORAL at 22:41

## 2020-01-21 RX ADMIN — ATORVASTATIN CALCIUM 40 MG: 40 TABLET, FILM COATED ORAL at 22:41

## 2020-01-21 NOTE — PROGRESS NOTES
TRANSFER - IN REPORT:    Verbal report received from Porfirio Gutierrez RN(name) on Elaina Armstrong  being received from ICU(unit) for routine progression of care      Report consisted of patients Situation, Background, Assessment and   Recommendations(SBAR). Information from the following report(s) SBAR, Kardex, Intake/Output, MAR, Recent Results and Cardiac Rhythm NSR was reviewed with the receiving nurse. Opportunity for questions and clarification was provided. Assessment completed upon patients arrival to unit and care assumed.

## 2020-01-21 NOTE — PROGRESS NOTES
[facility] Adult  Hospitalist Group                                                                                          Hospitalist Progress Note  Dorota Torres MD  Answering service: [phone] from in house phone        Date of Service:  2020  NAME:  Dorota Murcia  :  1935  MRN:  [id number]      Admission Summary:     82 yo female living independently with  last know well 20 @ 1045. Valentina Vazquez heard patient fall this morning around 9 am and called EMS. Pt brought to [facility] ED and stroke code called. When I   Saw her in the ER she had Left gaze preference , head also tilted somewhat to the left, with paresis  Of the right upper extremity, sometimes says ok vs nothing to most questions. Perfusion study shows infarcted left MCA  territory distal to thrombus; Perfusion defect also noted in WALDEMAR territory corresponding with a small area of ischemia with small thrombus noted in WALDEMAR pericallosal branch; filling distal to both emboli noted.  The patient will be admitted to the ICU for neuro checks and close observation. And optimization of blood pressure. \"     Interval history / Subjective:       Patient has no acute complaint. Wanted to go home. Family expressed concern that patient is unsteady on her gait and lives with her  who is not capable of taking care of her fully at this time. 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 Symptoms of right arm weakness with expressive aphasia. Improving. Echo shows EF of 55 to 60%. Neurology evaluated the patient. Continue aspirin and statin. PT, OT, speech therapy following. Fall  Status post mechanical fall with right occipital scalp swelling and hematoma. Now resolved. Urinary tract infection  Urine culture grew Enterobacter. Start IV Levaquin. Change to p.o. at the time of discharge. Intracranial hemorrhage  Noted right parafalcine and tentorial hemorrhage. Resolved per MRI. Continue aspirin per neuro. Dyslipidemia  On statin. Patient tolerating the medications. Dementia  Continue Aricept and Namenda. Family planning for possible long-term placement once acute rehab phase is over. Hypothyroidism  Continue Synthroid. Tobacco use  Counseling has been done. Code status: DNR  DVT prophylaxis: SCDs    Care Plan discussed with: Patient/Family, Nurse and   Disposition: [address] [place] [address] Problems  Date Reviewed: 7/19/2018          Codes Class Noted POA    Hypothyroidism ICD-10-CM: E03.9  ICD-9-CM: 244.9  1/20/2020 Yes        Hyperlipidemia ICD-10-CM: E78.5  ICD-9-CM: 272.4  1/20/2020 Yes        Expressive aphasia ICD-10-CM: R47.01  ICD-9-CM: 784.3  1/20/2020 Yes        Stroke (cerebrum) ([facility] [address] ICD-10-CM: I63.9  ICD-9-CM: 434.91  1/18/2020 Unknown        UTI (urinary tract infection) ICD-10-CM: N39.0  ICD-9-CM: 599.0  7/19/2018 Yes                Review of Systems:   A comprehensive review of systems was negative except for that written in the HPI. Vital Signs:    Last 24hrs VS reviewed since prior progress note. Most recent are:  Visit Vitals  /58   Pulse 63   Temp 97.3 °F (36.3 °C)   Resp 16   Ht 5' 6\" (1.676 m)   Wt 58.1 kg (128 lb 1.4 oz)   SpO2 98%   Breastfeeding No   BMI 20.67 kg/m²         Intake/Output Summary (Last 24 hours) at 1/21/2020 1701  Last data filed at 1/21/2020 0600  Gross per 24 hour   Intake 30 ml   Output    Net 30 ml        Physical Examination:             Constitutional:  No acute distress, cooperative, pleasant    ENT:  Oral mucous moist, oropharynx benign. Resp:  CTA bilaterally. No wheezing/rhonchi/rales. No accessory muscle use   CV:  Regular rhythm, normal rate, no murmurs, gallops, rubs    GI:  Soft, non distended, non tender. normoactive bowel sounds, no hepatosplenomegaly     Musculoskeletal:  No edema, warm, 2+ pulses throughout    Neurologic:  Moves all extremities.   AAOx3, CN II-XII reviewed     Skin:  Good turgor, no rashes or ulcers       Data Review:    Review and/or order of tests in the radiology section of [facility]      Labs:     Recent Labs     01/21/20  0149   WBC 9.7   HGB 11.9   HCT 35.6   *     Recent Labs     01/21/20  0149      K 3.6   *   CO2 25   BUN 20   CREA 0.96   GLU 99   CA 8.8     No results for input(s): SGOT, GPT, ALT, AP, TBIL, TBILI, TP, ALB, GLOB, GGT, AML, LPSE in the last 72 hours. No lab exists for component: AMYP, HLPSE  No results for input(s): INR, PTP, APTT, INREXT in the last 72 hours. No results for input(s): FE, TIBC, PSAT, FERR in the last 72 hours. No results found for: FOL, RBCF   No results for input(s): PH, PCO2, PO2 in the last 72 hours. No results for input(s): CPK, CKNDX, TROIQ in the last 72 hours.     No lab exists for component: CPKMB  Lab Results   Component Value Date/Time    Cholesterol, total 252 (H) 01/19/2020 03:56 AM    HDL Cholesterol 30 01/19/2020 03:56 AM    LDL, calculated 169 (H) 01/19/2020 03:56 AM    Triglyceride 265 (H) 01/19/2020 03:56 AM    CHOL/HDL Ratio 8.4 (H) 01/19/2020 03:56 AM     No results found for: GLUCPOC  Lab Results   Component Value Date/Time    Color YELLOW/STRAW 01/18/2020 11:13 AM    Appearance TURBID (A) 01/18/2020 11:13 AM    Specific gravity 1.015 01/18/2020 11:13 AM    Specific gravity 1.014 11/12/2018 10:19 PM    pH (UA) 7.0 01/18/2020 11:13 AM    Protein TRACE (A) 01/18/2020 11:13 AM    Glucose NEGATIVE  01/18/2020 11:13 AM    Ketone NEGATIVE  01/18/2020 11:13 AM    Bilirubin NEGATIVE  01/18/2020 11:13 AM    Urobilinogen 0.2 01/18/2020 11:13 AM    Nitrites NEGATIVE  01/18/2020 11:13 AM    Leukocyte Esterase LARGE (A) 01/18/2020 11:13 AM    Epithelial cells FEW 01/18/2020 11:13 AM    Bacteria 1+ (A) 01/18/2020 11:13 AM    WBC >100 (H) 01/18/2020 11:13 AM    RBC 20-50 01/18/2020 11:13 AM         Medications Reviewed:     Current Facility-Administered Medications   Medication Dose Route Frequency    levoFLOXacin (LEVAQUIN) 500 mg in D5W IVPB  500 mg IntraVENous Q24H    aspirin tablet 325 mg  325 mg Oral DAILY    donepeziL (ARICEPT) tablet 5 mg  5 mg Oral QHS    levothyroxine (SYNTHROID) tablet 100 mcg  100 mcg Oral Once per day on Mon Tue Wed Thu Fri Sat    And    [START ON 1/26/2020] levothyroxine (SYNTHROID) tablet 50 mcg  50 mcg Oral every Sunday    docusate sodium (COLACE) capsule 100 mg  100 mg Oral BID    memantine (NAMENDA) tablet 10 mg  10 mg Oral BID    escitalopram oxalate (LEXAPRO) tablet 20 mg  20 mg Oral DAILY    ondansetron (ZOFRAN) injection 4 mg  4 mg IntraVENous Q4H PRN    melatonin tablet 3 mg  3 mg Oral QHS PRN    acetaminophen (TYLENOL) tablet 650 mg  650 mg Oral Q4H PRN    hydrALAZINE (APRESOLINE) tablet 10 mg  10 mg Oral Q8H PRN    atorvastatin (LIPITOR) tablet 40 mg  40 mg Oral QHS     ______________________________________________________________________  EXPECTED LENGTH OF STAY: 3d 2h  ACTUAL LENGTH OF STAY:          3                 Julienne Agrawal MD

## 2020-01-21 NOTE — PROGRESS NOTES
Problem: Dysphagia (Adult)  Goal: *Acute Goals and Plan of Care (Insert Text)  Description  Speech Therapy Goals  Initiated 1/20/2020    1. Patient will tolerate regular diet/thin liquids without adverse effects within 7 days. Outcome: Resolved/Met     Problem: Communication Impaired (Adult)  Goal: *Acute Goals and Plan of Care (Insert Text)  Description  Speech Therapy Goals  Initiated 1/20/2020    1. Patient will answer complex yes/no questions with 80% accuracy within 7 days. 2. Patient will complete modified melodic intonation therapy tasks with 80% accuracy with mod cues within 7 days. 3. Patient will complete sentence completion tasks with 80% accuracy within 7 days. 4. Patient will complete repetition tasks with 80% accuracy within 7 days. Outcome: Progressing Towards Goal     SPEECH LANGUAGE PATHOLOGY DYSPHAGIA AND SPEECH TREATMENT  Patient: Fawad Cali (62 y.o. female)  Date: 1/21/2020  Diagnosis: Stroke (cerebrum) ([facility] [address] [I63.9]   <principal problem not specified>       Precautions: Fall    ASSESSMENT:  Pt with functional oropharyngeal phases of swallow on this date without any overt s/s of aspiration. Recommend pt continue on regular diet/thin liquids. No further swallowing intervention indicated. Pt with improvement noted on this date, however, still noted deficits in both expressive-receptive language. Receptive language deficits still noted in complex auditory comprehension tasks, and pt benefits from repetition, slow rate of speech, and extra processing time. Expressive language deficits noted including phonemic and verbal paraphasias, perseveration noted on this date (\"no\"), and decreased initiation. However, pt with strong participation and responded well to multimodality cues. Continue to feel pt is an excellent candidate for inpatient rehabilitation from SLP standpoint.       PLAN:  Recommendations and Planned Interventions:  --regular diet/thin liquids  --no further swallowing intervention  --allow ample time for communication and processing. Patient continues to benefit from skilled intervention to address the above impairments. Continue treatment per established plan of care. Discharge Recommendations:  Inpatient Rehab     SUBJECTIVE:   Patient stated, \"Country roads, take me no singing to [employer]". OBJECTIVE:   Cognitive and Communication Status:  Neurologic State: Alert  Orientation Level: Oriented X4  Cognition: Appropriate decision making, Appropriate for age attention/concentration, Appropriate safety awareness  Perception: Appears intact  Perseveration: No perseveration noted  Safety/Judgement: Awareness of environment, Fall prevention    Dysphagia Treatment and Interventions:  P.O. Trials:  Patient Position: upright in bed  Vocal quality prior to P.O.: No impairment  Consistency Presented: Thin liquid; Solid  How Presented: Self-fed/presented;Cup/sip;Straw     Bolus Acceptance: No impairment  Bolus Formation/Control: No impairment     Propulsion: No impairment  Oral Residue: None  Initiation of Swallow: No impairment  Laryngeal Elevation: Functional  Aspiration Signs/Symptoms: None  Pharyngeal Phase Characteristics: No impairment, issues, or problems   Effective Modifications: None  Cues for Modifications: None       Oral Phase Severity: No impairment  Pharyngeal Phase Severity : No impairment      Speech Treatment and Interventions:    Language Comprehension and Expression:  Auditory Comprehension   Auditory Impairment: Yes  Response to Basic Yes/No Questions (%): 100 %  Response to Complex Yes/No Questions (%) : 70 %  One-Step Basic Commands (%): 100 %  Three-Step Basic Commands (%): (requires repetition)  Interfering Components: Processing speed  Effective Techniques: Extra processing time;Repetition; Slowed speech;Stressing words  Verbal Expression  Verbal Expression  Primary Mode of Expression: Verbal  Initiation: Impaired (%)  Automatic Speech Task: Impaired (comment)  Automatic speech task cueing type: Multi modality(modified melodic intonation therapy)  Repetition: Impaired(phonemic and verbal paraphasias)    Pain:  Pain Scale 1: Numeric (0 - 10)  Pain Intensity 1: 0       After treatment:   Patient left in no apparent distress in bed, Call bell within reach, Nursing notified, Caregiver / family present, and Bed / chair alarm activated    COMMUNICATION/EDUCATION:   Patient was educated regarding her deficit(s) of aphasia as this relates to her diagnosis of stroke. She demonstrated fair understanding as evidenced. The patient's plan of care including recommendations, planned interventions, and recommended diet changes were discussed with: Registered Nurse.       Jacinta Campbell SLP  Time Calculation: 23 mins

## 2020-01-21 NOTE — PROGRESS NOTES
Primary Nurse Anette Cisneros, RN and Alfredo Padilla, TORY, RN performed a dual skin assessment on this patient Impairment noted- see wound doc flow sheet  Torsten score is 17  Small abrasion on right elbow

## 2020-01-21 NOTE — PROGRESS NOTES
LANA:    -Expected to discharge within 24 hrs pending medical clearance. Met with patient, her son and daughter-in-law at bedside discuss rehab options. Therapy recommended IPR placement. The Plan for Transition of Care is related to the following treatment goals: IPR placement. Met with patient, her son and daughter-in-law at bedside. Family selected [id number] [facility]. Sent referral via [facility]; Awaiting response. The Patient and/or patient representative were provided with a choice of provider and agrees with the discharge plan. [x] Yes [] No    Freedom of choice list was provided with basic dialogue that supports the patient's individualized plan of care/goals, treatment preferences and shares the quality data associated with the providers. [x] Yes [] No    -Provided list of Assisted Living Facilities per request and directed family to contact for info. BANG to follow.  YESICA Rhodes,CRM

## 2020-01-21 NOTE — PROGRESS NOTES
Problem: Falls - Risk of  Goal: *Absence of Falls  Description  Document Pedro Haddad Fall Risk and appropriate interventions in the flowsheet. Outcome: Progressing Towards Goal  Note: Fall Risk Interventions:  Mobility Interventions: Patient to call before getting OOB    Mentation Interventions: Adequate sleep, hydration, pain control, Door open when patient unattended    Medication Interventions: Teach patient to arise slowly, Patient to call before getting OOB    Elimination Interventions: Call light in reach    History of Falls Interventions: Consult care management for discharge planning, Evaluate medications/consider consulting pharmacy         Problem: Patient Education: Go to Patient Education Activity  Goal: Patient/Family Education  Outcome: Progressing Towards Goal     Problem: Pressure Injury - Risk of  Goal: *Prevention of pressure injury  Description  Document Torsten Scale and appropriate interventions in the flowsheet.   Outcome: Progressing Towards Goal  Note: Pressure Injury Interventions:  Sensory Interventions: Assess changes in LOC    Moisture Interventions: Absorbent underpads    Activity Interventions: Increase time out of bed, Pressure redistribution bed/mattress(bed type)    Mobility Interventions: Pressure redistribution bed/mattress (bed type)    Nutrition Interventions: Document food/fluid/supplement intake    Friction and Shear Interventions: Lift sheet                Problem: Patient Education: Go to Patient Education Activity  Goal: Patient/Family Education  Outcome: Progressing Towards Goal     Problem: Patient Education: Go to Patient Education Activity  Goal: Patient/Family Education  Outcome: Progressing Towards Goal     Problem: TIA/CVA Stroke: Day 2 Until Discharge  Goal: Off Pathway (Use only if patient is Off Pathway)  Outcome: Progressing Towards Goal  Goal: Activity/Safety  Outcome: Progressing Towards Goal  Goal: Diagnostic Test/Procedures  Outcome: Progressing Towards Goal  Goal: Nutrition/Diet  Outcome: Progressing Towards Goal  Goal: Discharge Planning  Outcome: Progressing Towards Goal  Goal: Medications  Outcome: Progressing Towards Goal  Goal: Respiratory  Outcome: Progressing Towards Goal  Goal: Treatments/Interventions/Procedures  Outcome: Progressing Towards Goal  Goal: Psychosocial  Outcome: Progressing Towards Goal  Goal: *Verbalizes anxiety and depression are reduced or absent  Outcome: Progressing Towards Goal  Goal: *Absence of aspiration  Outcome: Progressing Towards Goal  Goal: *Absence of deep venous thrombosis signs and symptoms(Stroke Metric)  Outcome: Progressing Towards Goal  Goal: *Optimal pain control at patient's stated goal  Outcome: Progressing Towards Goal  Goal: *Tolerating diet  Outcome: Progressing Towards Goal  Goal: *Ability to perform ADLs and demonstrates progressive mobility and function  Outcome: Progressing Towards Goal  Goal: *Stroke education continued(Stroke Metric)  Outcome: Progressing Towards Goal     Problem: Ischemic Stroke: Discharge Outcomes  Goal: *Verbalizes anxiety and depression are reduced or absent  Outcome: Progressing Towards Goal  Goal: *Verbalize understanding of risk factor modification(Stroke Metric)  Outcome: Progressing Towards Goal  Goal: *Hemodynamically stable  Outcome: Progressing Towards Goal  Goal: *Absence of aspiration pneumonia  Outcome: Progressing Towards Goal  Goal: *Aware of needed dietary changes  Outcome: Progressing Towards Goal  Goal: *Verbalize understanding of prescribed medications including anti-coagulants, anti-lipid, and/or anti-platelets(Stroke Metric)  Outcome: Progressing Towards Goal  Goal: *Tolerating diet  Outcome: Progressing Towards Goal  Goal: *Aware of follow-up diagnostics related to anticoagulants  Outcome: Progressing Towards Goal  Goal: *Ability to perform ADLs and demonstrates progressive mobility and function  Outcome: Progressing Towards Goal  Goal: *Absence of DVT(Stroke Metric)  Outcome: Progressing Towards Goal  Goal: *Absence of aspiration  Outcome: Progressing Towards Goal  Goal: *Optimal pain control at patient's stated goal  Outcome: Progressing Towards Goal  Goal: *Home safety concerns addressed  Outcome: Progressing Towards Goal  Goal: *Describes available resources and support systems  Outcome: Progressing Towards Goal  Goal: *Verbalizes understanding of activation of EMS(911) for stroke symptoms(Stroke Metric)  Outcome: Progressing Towards Goal  Goal: *Understands and describes signs and symptoms to report to providers(Stroke Metric)  Outcome: Progressing Towards Goal  Goal: *Neurolgocially stable (absence of additional neurological deficits)  Outcome: Progressing Towards Goal  Goal: *Verbalizes importance of follow-up with primary care physician(Stroke Metric)  Outcome: Progressing Towards Goal  Goal: *Smoking cessation discussed,if applicable(Stroke Metric)  Outcome: Progressing Towards Goal  Goal: *Depression screening completed(Stroke Metric)  Outcome: Progressing Towards Goal     Problem: Patient Education: Go to Patient Education Activity  Goal: Patient/Family Education  Outcome: Progressing Towards Goal     Problem: Patient Education: Go to Patient Education Activity  Goal: Patient/Family Education  Outcome: Progressing Towards Goal     Problem: Patient Education: Go to Patient Education Activity  Goal: Patient/Family Education  Outcome: Progressing Towards Goal     Problem: Patient Education: Go to Patient Education Activity  Goal: Patient/Family Education  Outcome: Progressing Towards Goal

## 2020-01-21 NOTE — PROGRESS NOTES
Physical Therapy: Defer    Chart reviewed and attempted to see pt for PT treatment. Pt currently working with speech therapy. Will defer at this time and follow up as able and appropriate. Thank you.     Tabitha Fabian, PT, DPT

## 2020-01-21 NOTE — PROGRESS NOTES
Bedside and Verbal shift change report given to Jai Luo RN (oncoming nurse) by Jaquelin Gomez RN (offgoing nurse). Report included the following information SBAR, Kardex, Intake/Output, MAR and Recent Results.

## 2020-01-21 NOTE — PROGRESS NOTES
Bedside shift change report given to Jaylin Ryder RN (oncoming nurse) by [facility] [address], RN (offgoing nurse). Report included the following information SBAR, Kardex, Intake/Output, Accordion, Recent Results, Cardiac Rhythm NSR/Sinus Lossie Steffi and Dual Neuro Assessment.

## 2020-01-21 NOTE — INTERDISCIPLINARY ROUNDS
During interdisciplinary rounds at 0930. Care management, physical therapy, and nursing discussed discharge and plan of care. See clinical pathway and/or care plan for interventions and desired outcomes.

## 2020-01-21 NOTE — PROGRESS NOTES
Problem: Ischemic Stroke: Discharge Outcomes  Goal: *Absence of DVT(Stroke Metric)  Outcome: Progressing Towards Goal  Goal: *Absence of aspiration  Outcome: Progressing Towards Goal  Goal: *Home safety concerns addressed  Outcome: Progressing Towards Goal  Goal: *Describes available resources and support systems  Outcome: Progressing Towards Goal  Goal: *Neurolgocially stable (absence of additional neurological deficits)  Outcome: Progressing Towards Goal

## 2020-01-22 VITALS
HEART RATE: 62 BPM | BODY MASS INDEX: 20.69 KG/M2 | RESPIRATION RATE: 14 BRPM | HEIGHT: 66 IN | DIASTOLIC BLOOD PRESSURE: 71 MMHG | OXYGEN SATURATION: 98 % | SYSTOLIC BLOOD PRESSURE: 138 MMHG | TEMPERATURE: 97.3 F | WEIGHT: 128.75 LBS

## 2020-01-22 LAB
BACTERIA SPEC CULT: ABNORMAL
CC UR VC: ABNORMAL
SERVICE CMNT-IMP: ABNORMAL

## 2020-01-22 PROCEDURE — 97116 GAIT TRAINING THERAPY: CPT

## 2020-01-22 PROCEDURE — 74011250636 HC RX REV CODE- 250/636: Performed by: FAMILY MEDICINE

## 2020-01-22 PROCEDURE — 97530 THERAPEUTIC ACTIVITIES: CPT

## 2020-01-22 PROCEDURE — 74011250637 HC RX REV CODE- 250/637: Performed by: PSYCHIATRY & NEUROLOGY

## 2020-01-22 PROCEDURE — 74011250637 HC RX REV CODE- 250/637: Performed by: NURSE PRACTITIONER

## 2020-01-22 RX ORDER — ASPIRIN 325 MG
325 TABLET ORAL DAILY
Qty: 30 TAB | Refills: 0 | Status: SHIPPED | OUTPATIENT
Start: 2020-01-23 | End: 2022-07-04

## 2020-01-22 RX ORDER — LEVOTHYROXINE SODIUM 50 UG/1
50 TABLET ORAL
Status: DISCONTINUED | OUTPATIENT
Start: 2020-01-26 | End: 2020-01-22 | Stop reason: SDUPTHER

## 2020-01-22 RX ORDER — ATORVASTATIN CALCIUM 40 MG/1
40 TABLET, FILM COATED ORAL
Qty: 30 TAB | Refills: 0 | Status: SHIPPED | OUTPATIENT
Start: 2020-01-22 | End: 2022-07-04

## 2020-01-22 RX ORDER — LEVOTHYROXINE SODIUM 100 UG/1
100 TABLET ORAL
Status: DISCONTINUED | OUTPATIENT
Start: 2020-01-23 | End: 2020-01-22 | Stop reason: SDUPTHER

## 2020-01-22 RX ORDER — LEVOFLOXACIN 250 MG/1
250 TABLET ORAL DAILY
Qty: 5 TAB | Refills: 0 | Status: SHIPPED | OUTPATIENT
Start: 2020-01-22 | End: 2022-07-04

## 2020-01-22 RX ORDER — LANOLIN ALCOHOL/MO/W.PET/CERES
3 CREAM (GRAM) TOPICAL
Qty: 30 TAB | Refills: 0 | Status: SHIPPED | OUTPATIENT
Start: 2020-01-22 | End: 2022-10-21

## 2020-01-22 RX ORDER — LEVOFLOXACIN 5 MG/ML
250 INJECTION, SOLUTION INTRAVENOUS EVERY 24 HOURS
Status: DISCONTINUED | OUTPATIENT
Start: 2020-01-22 | End: 2020-01-22 | Stop reason: HOSPADM

## 2020-01-22 RX ADMIN — LEVOTHYROXINE SODIUM 100 MCG: 50 TABLET ORAL at 06:00

## 2020-01-22 RX ADMIN — MEMANTINE HYDROCHLORIDE 10 MG: 10 TABLET ORAL at 18:02

## 2020-01-22 RX ADMIN — DOCUSATE SODIUM 100 MG: 100 CAPSULE, LIQUID FILLED ORAL at 08:57

## 2020-01-22 RX ADMIN — MEMANTINE HYDROCHLORIDE 10 MG: 10 TABLET ORAL at 08:57

## 2020-01-22 RX ADMIN — ASPIRIN 325 MG ORAL TABLET 325 MG: 325 PILL ORAL at 08:57

## 2020-01-22 RX ADMIN — ESCITALOPRAM OXALATE 20 MG: 10 TABLET ORAL at 08:57

## 2020-01-22 RX ADMIN — DOCUSATE SODIUM 100 MG: 100 CAPSULE, LIQUID FILLED ORAL at 18:02

## 2020-01-22 RX ADMIN — LEVOFLOXACIN 250 MG: 5 INJECTION, SOLUTION INTRAVENOUS at 15:18

## 2020-01-22 NOTE — PROGRESS NOTES
Bedside and Verbal shift change report given to Blanca Canseco (oncoming nurse) by Viral Escobedo (offgoing nurse). Report included the following information SBAR, Kardex, Recent Results and Dual Neuro Assessment.

## 2020-01-22 NOTE — PROGRESS NOTES
[facility]                                                                                          Hospitalist Progress Note  Cain Morales MD  Answering service: [phone] from in house phone        Date of Service:  2020  NAME:  Dodie Davies  :  1935  MRN:  [id number]      Admission Summary:     80 yo female living independently with  last know well 20 @ 1045. Anushka Degrootk heard patient fall this morning around 9 am and called EMS. Pt brought to [facility] ED and stroke code called. When I   Saw her in the ER she had Left gaze preference , head also tilted somewhat to the left, with paresis  Of the right upper extremity, sometimes says ok vs nothing to most questions. Perfusion study shows infarcted left MCA  territory distal to thrombus; Perfusion defect also noted in WALDEMAR territory corresponding with a small area of ischemia with small thrombus noted in WALDEMAR pericallosal branch; filling distal to both emboli noted.  The patient will be admitted to the ICU for neuro checks and close observation. And optimization of blood pressure. \"    Interval history / Subjective:       No acute complaint today. Awaiting placement. Assessment & Plan:     Acute CVA  CVA non traumatic. Left MCA and WALDEMAR territories. Symptoms of right arm weakness with expressive aphasia. Improving. Echo shows EF of 55 to 60%. Neurology evaluated the patient. Continue aspirin and statin. PT, OT, speech therapy following.     Fall  Status post mechanical fall with right occipital scalp swelling and hematoma. Now resolved.     Urinary tract infection  Urine culture grew Enterobacter. Start IV Levaquin. Change to p.o. at the time of discharge.     Intracranial hemorrhage  Noted right parafalcine and tentorial hemorrhage. Resolved per MRI. Continue aspirin per neuro.     Dyslipidemia  On statin. Patient tolerating the medications.     Dementia  Continue Aricept and Namenda. Family planning for possible long-term placement once acute rehab phase is over.     Hypothyroidism  Continue Synthroid.     Tobacco use  Counseling has been done.     Code status: DNR  DVT prophylaxis: SCDs     Care Plan discussed with: Patient/Family, Nurse and   Disposition: [facility] Problems  Date Reviewed: 7/19/2018          Codes Class Noted POA    Hypothyroidism ICD-10-CM: E03.9  ICD-9-CM: 244.9  1/20/2020 Yes        Hyperlipidemia ICD-10-CM: E78.5  ICD-9-CM: 272.4  1/20/2020 Yes        Expressive aphasia ICD-10-CM: R47.01  ICD-9-CM: 784.3  1/20/2020 Yes        Stroke (cerebrum) ([facility] [address] ICD-10-CM: I63.9  ICD-9-CM: 434.91  1/18/2020 Unknown        UTI (urinary tract infection) ICD-10-CM: N39.0  ICD-9-CM: 599.0  7/19/2018 Yes                Review of Systems:   A comprehensive review of systems was negative except for that written in the HPI. Vital Signs:    Last 24hrs VS reviewed since prior progress note. Most recent are:  Visit Vitals  /51   Pulse (!) 59   Temp 98 °F (36.7 °C)   Resp 15   Ht 5' 6\" (1.676 m)   Wt 58.4 kg (128 lb 12 oz)   SpO2 94%   Breastfeeding No   BMI 20.78 kg/m²         Intake/Output Summary (Last 24 hours) at 1/22/2020 1551  Last data filed at 1/21/2020 2154  Gross per 24 hour   Intake 360 ml   Output 900 ml   Net -540 ml        Physical Examination:             Constitutional:  No acute distress, cooperative, pleasant    ENT:  Oral mucous moist, oropharynx benign. Resp:  CTA bilaterally. No wheezing/rhonchi/rales. No accessory muscle use   CV:  Regular rhythm, normal rate, no murmurs, gallops, rubs    GI:  Soft, non distended, non tender. normoactive bowel sounds, no hepatosplenomegaly     Musculoskeletal:  No edema, warm, 2+ pulses throughout    Neurologic:  Moves all extremities.   AAOx3, CN II-XII reviewed     Skin:  Good turgor, no rashes or ulcers       Data Review:    Review and/or order of clinical lab test      Labs:     Recent Labs 01/21/20  0149   WBC 9.7   HGB 11.9   HCT 35.6   *     Recent Labs     01/21/20  0149      K 3.6   *   CO2 25   BUN 20   CREA 0.96   GLU 99   CA 8.8     No results for input(s): SGOT, GPT, ALT, AP, TBIL, TBILI, TP, ALB, GLOB, GGT, AML, LPSE in the last 72 hours. No lab exists for component: AMYP, HLPSE  No results for input(s): INR, PTP, APTT, INREXT in the last 72 hours. No results for input(s): FE, TIBC, PSAT, FERR in the last 72 hours. No results found for: FOL, RBCF   No results for input(s): PH, PCO2, PO2 in the last 72 hours. No results for input(s): CPK, CKNDX, TROIQ in the last 72 hours.     No lab exists for component: CPKMB  Lab Results   Component Value Date/Time    Cholesterol, total 252 (H) 01/19/2020 03:56 AM    HDL Cholesterol 30 01/19/2020 03:56 AM    LDL, calculated 169 (H) 01/19/2020 03:56 AM    Triglyceride 265 (H) 01/19/2020 03:56 AM    CHOL/HDL Ratio 8.4 (H) 01/19/2020 03:56 AM     No results found for: GLUCPOC  Lab Results   Component Value Date/Time    Color YELLOW/STRAW 01/18/2020 11:13 AM    Appearance TURBID (A) 01/18/2020 11:13 AM    Specific gravity 1.015 01/18/2020 11:13 AM    Specific gravity 1.014 11/12/2018 10:19 PM    pH (UA) 7.0 01/18/2020 11:13 AM    Protein TRACE (A) 01/18/2020 11:13 AM    Glucose NEGATIVE  01/18/2020 11:13 AM    Ketone NEGATIVE  01/18/2020 11:13 AM    Bilirubin NEGATIVE  01/18/2020 11:13 AM    Urobilinogen 0.2 01/18/2020 11:13 AM    Nitrites NEGATIVE  01/18/2020 11:13 AM    Leukocyte Esterase LARGE (A) 01/18/2020 11:13 AM    Epithelial cells FEW 01/18/2020 11:13 AM    Bacteria 1+ (A) 01/18/2020 11:13 AM    WBC >100 (H) 01/18/2020 11:13 AM    RBC 20-50 01/18/2020 11:13 AM         Medications Reviewed:     Current Facility-Administered Medications   Medication Dose Route Frequency    levoFLOXacin (LEVAQUIN) 250 mg in D5W IVPB  250 mg IntraVENous Q24H    aspirin tablet 325 mg  325 mg Oral DAILY    donepeziL (ARICEPT) tablet 5 mg  5 mg Oral QHS    levothyroxine (SYNTHROID) tablet 100 mcg  100 mcg Oral Once per day on Mon Tue Wed Thu Fri Sat    And    [START ON 1/26/2020] levothyroxine (SYNTHROID) tablet 50 mcg  50 mcg Oral every Sunday    docusate sodium (COLACE) capsule 100 mg  100 mg Oral BID    memantine (NAMENDA) tablet 10 mg  10 mg Oral BID    escitalopram oxalate (LEXAPRO) tablet 20 mg  20 mg Oral DAILY    ondansetron (ZOFRAN) injection 4 mg  4 mg IntraVENous Q4H PRN    melatonin tablet 3 mg  3 mg Oral QHS PRN    acetaminophen (TYLENOL) tablet 650 mg  650 mg Oral Q4H PRN    hydrALAZINE (APRESOLINE) tablet 10 mg  10 mg Oral Q8H PRN    atorvastatin (LIPITOR) tablet 40 mg  40 mg Oral QHS     ______________________________________________________________________  EXPECTED LENGTH OF STAY: 3d 2h  ACTUAL LENGTH OF STAY:          4                 Julienne MD Nghia

## 2020-01-22 NOTE — DISCHARGE SUMMARY
Discharge Summary       PATIENT ID: Neto Zapata  MRN: [id number]   YOB: 1935    DATE OF ADMISSION: 1/18/2020  9:46 AM    DATE OF DISCHARGE: 1/22/2020   PRIMARY CARE PROVIDER: Johanna Horan MD     ATTENDING PHYSICIAN: Toshia Morris  DISCHARGING PROVIDER: Mainor Belcher MD    To contact this individual call [phone] and ask the  to page. If unavailable ask to be transferred the Adult Hospitalist Department. CONSULTATIONS: IP CONSULT TO NEUROLOGY    PROCEDURES/SURGERIES: * No surgery found *    ADMITTING DIAGNOSES & HOSPITAL COURSE:     HPI    80 yo female living independently with  last know well 1/18/20 @ 1045. Pete Lennon heard patient fall this morning around 9 am and called EMS. Pt brought to [facility] ED and stroke code called. When I   Saw her in the ER she had Left gaze preference , head also tilted somewhat to the left, with paresis  Of the right upper extremity, sometimes says ok vs nothing to most questions. Perfusion study shows infarcted left MCA  territory distal to thrombus; Perfusion defect also noted in WALDEMAR territory corresponding with a small area of ischemia with small thrombus noted in WALDEMAR pericallosal branch; filling distal to both emboli noted.  The patient will be admitted to the ICU for neuro checks and close observation. And optimization of blood pressure. \"     Hospital course    1. Acute CVA  Left MCA and WALDEMAR territories. Neurology evaluated the patient and patient started on aspirin and statin. PT, OT and speech therapy evaluated the patient. [facility]. 2.  Urinary tract infection  Urine culture grew Enterobacter. Patient started on IV Levaquin during this hospitalization, will be changed to p.o. Levaquin on discharge to complete total of 7-day course of antibiotics. 3.  Intracranial hemorrhage  Patient with status post fall.   Initial CT of the head shows acute then right parafalcine and tentorial subdural hemorrhage with small right occipital right scalp swelling and hematoma. SDH associated with mechanical fall. Follow-up scans and MRI does not show hemorrhage. DISCHARGE DIAGNOSES / PLAN:      1. Acute CVA  2. Urinary tract infection  3. Status post mechanical fall  4. Dyslipidemia  5. Dementia  6. Hypothyroidism  7. Tobacco use     ADDITIONAL CARE RECOMMENDATIONS:     None     PENDING TEST RESULTS:   At the time of discharge the following test results are still pending: None    FOLLOW UP APPOINTMENTS:    Follow-up Information     Follow up With Specialties Details Why Contact Info    Clinton Ivey MD [facility]   [address] [facility]  [address] [place] [address] [phone]               DIET: Cardiac Diet  Oral Nutritional Supplements: No Oral Supplement prescribed    ACTIVITY: PT/OT Eval and Treat    WOUND CARE: none    EQUIPMENT needed: None      DISCHARGE MEDICATIONS:  Current Discharge Medication List      START taking these medications    Details   aspirin (ASPIRIN) 325 mg tablet Take 1 Tab by mouth daily. Qty: 30 Tab, Refills: 0      atorvastatin (LIPITOR) 40 mg tablet Take 1 Tab by mouth nightly. Qty: 30 Tab, Refills: 0      melatonin 3 mg tablet Take 1 Tab by mouth nightly as needed (sleep). Qty: 30 Tab, Refills: 0      levoFLOXacin (LEVAQUIN) 250 mg tablet Take 1 Tab by mouth daily. Qty: 5 Tab, Refills: 0         CONTINUE these medications which have NOT CHANGED    Details   !! levothyroxine (SYNTHROID) 50 mcg tablet Take 50 mcg by mouth every Sunday. Take 100 mcg by mouth Monday through Saturday. Take half-tablet (50 mcg) by mouth on Sunday      donepeziL (ARICEPT) 5 mg tablet Take 5 mg by mouth nightly. memantine ER (NAMENDA XR) 28 mg capsule Take 28 mg by mouth daily. cranberry 500 mg capsule Take 500 mg by mouth daily. multivit-min/iron/folic/lutein (CENTRUM SILVER WOMEN PO) Take 1,500 mg by mouth daily.       escitalopram oxalate (LEXAPRO) 20 mg tablet Take 20 mg by mouth daily. !! levothyroxine (SYNTHROID) 100 mcg tablet Take 100 mcg by mouth six (6) days a week. Take 100 mcg by mouth Monday through Saturday. Take half-tablet (50 mcg) by mouth on Sunday       !! - Potential duplicate medications found. Please discuss with provider. STOP taking these medications       OTHER Comments:   Reason for Stopping:                 NOTIFY YOUR PHYSICIAN FOR ANY OF THE FOLLOWING:   Fever over 101 degrees for 24 hours. Chest pain, shortness of breath, fever, chills, nausea, vomiting, diarrhea, change in mentation, falling, weakness, bleeding. Severe pain or pain not relieved by medications. Or, any other signs or symptoms that you may have questions about. DISPOSITION:    Home With:   OT  PT  HH  RN       Long term SNF/Inpatient Rehab    Independent/assisted living    Hospice    Other:       PATIENT CONDITION AT DISCHARGE:     Functional status    Poor     Deconditioned     Independent      Cognition     Lucid     Forgetful     Dementia      Catheters/lines (plus indication)    Winters     PICC     PEG     None      Code status     Full code     DNR      PHYSICAL EXAMINATION AT DISCHARGE:  General:          Alert, cooperative, no distress, appears stated age. HEENT:           Atraumatic, anicteric sclerae, pink conjunctivae                          No oral ulcers, mucosa moist, throat clear, dentition fair  Neck:               Supple, symmetrical  Lungs:             Clear to auscultation bilaterally. No Wheezing or Rhonchi. No rales. Chest wall:      No tenderness  No Accessory muscle use. Heart:              Regular  rhythm,  No  murmur   No edema  Abdomen:        Soft, non-tender. Not distended. Bowel sounds normal  Extremities:     No cyanosis. No clubbing,                            Skin turgor normal, Capillary refill normal  Skin:                Not pale. Not Jaundiced  No rashes   Psych:             Not anxious or agitated.   Neurologic: Alert, moves all extremities, answers questions appropriately and responds to commands       CHRONIC MEDICAL DIAGNOSES:  Problem List as of 1/22/2020 Date Reviewed: 7/19/2018          Codes Class Noted - Resolved    Hypothyroidism ICD-10-CM: E03.9  ICD-9-CM: 244.9  1/20/2020 - Present        Hyperlipidemia ICD-10-CM: E78.5  ICD-9-CM: 272.4  1/20/2020 - Present        Expressive aphasia ICD-10-CM: R47.01  ICD-9-CM: 784.3  1/20/2020 - Present        Stroke (cerebrum) ([facility] [address] ICD-10-CM: I63.9  ICD-9-CM: 434.91  1/18/2020 - Present        UTI (urinary tract infection) ICD-10-CM: N39.0  ICD-9-CM: 599.0  7/19/2018 - Present              Greater than 30 minutes were spent with the patient on counseling and coordination of care    Signed:   Mohini Sanchez MD  1/22/2020  4:55 PM

## 2020-01-22 NOTE — PROGRESS NOTES
TRANSFER - OUT REPORT: 
 
Verbal report given to TORY Zapata(name) on Marga Barton  being transferred to (unit) for routine progression of care Report consisted of patients Situation, Background, Assessment and  
Recommendations(SBAR). Information from the following report(s) SBAR, Kardex, Intake/Output, MAR, Recent Results and Cardiac Rhythm Sinus Bradycardia was reviewed with the receiving nurse. Lines:  
Peripheral IV 01/19/20 Anterior;Distal;Left Forearm (Active) Site Assessment Clean, dry, & intact 1/22/2020 12:00 PM  
Phlebitis Assessment 0 1/22/2020 12:00 PM  
Infiltration Assessment 0 1/22/2020 12:00 PM  
Dressing Status Clean, dry, & intact 1/22/2020 12:00 PM  
Dressing Type Transparent;Tape 1/22/2020 12:00 PM  
Hub Color/Line Status Pink;Capped;Flushed 1/22/2020 12:00 PM  
Action Taken Open ports on tubing capped 1/22/2020 12:00 PM  
Alcohol Cap Used Yes 1/22/2020 12:00 PM  
  
 
Opportunity for questions and clarification was provided. Patient transported with: 
 Registered Nurse

## 2020-01-22 NOTE — PROGRESS NOTES
Bedside shift change report given to Babita (oncoming nurse) by Noah Foss (offgoing nurse). Report included the following information SBAR, Kardex, Procedure Summary, MAR, Accordion, Recent Results, Cardiac Rhythm NSR/SB and Dual Neuro Assessment.

## 2020-01-22 NOTE — PROGRESS NOTES
Problem: Falls - Risk of  Goal: *Absence of Falls  Description  Document Hira Dyson Fall Risk and appropriate interventions in the flowsheet. Outcome: Progressing Towards Goal  Note: Fall Risk Interventions:  Mobility Interventions: Bed/chair exit alarm, Patient to call before getting OOB, PT Consult for mobility concerns    Mentation Interventions: Adequate sleep, hydration, pain control, Bed/chair exit alarm, Door open when patient unattended, More frequent rounding, Reorient patient, Room close to nurse's station    Medication Interventions: Patient to call before getting OOB, Teach patient to arise slowly, Bed/chair exit alarm    Elimination Interventions: Bed/chair exit alarm, Call light in reach, Patient to call for help with toileting needs, Stay With Me (per policy), Toileting schedule/hourly rounds    History of Falls Interventions: Bed/chair exit alarm, Consult care management for discharge planning, Door open when patient unattended, Investigate reason for fall, Evaluate medications/consider consulting pharmacy, Room close to nurse's station         Problem: Patient Education: Go to Patient Education Activity  Goal: Patient/Family Education  Outcome: Progressing Towards Goal     Problem: Pressure Injury - Risk of  Goal: *Prevention of pressure injury  Description  Document Torsten Scale and appropriate interventions in the flowsheet.   Outcome: Progressing Towards Goal  Note: Pressure Injury Interventions:  Sensory Interventions: Assess changes in LOC, Keep linens dry and wrinkle-free, Maintain/enhance activity level, Minimize linen layers, Pressure redistribution bed/mattress (bed type)    Moisture Interventions: Absorbent underpads, Minimize layers    Activity Interventions: PT/OT evaluation, Pressure redistribution bed/mattress(bed type), Increase time out of bed    Mobility Interventions: Pressure redistribution bed/mattress (bed type), PT/OT evaluation    Nutrition Interventions: Document food/fluid/supplement intake, Offer support with meals,snacks and hydration    Friction and Shear Interventions: Apply protective barrier, creams and emollients                Problem: Patient Education: Go to Patient Education Activity  Goal: Patient/Family Education  Outcome: Progressing Towards Goal     Problem: Patient Education: Go to Patient Education Activity  Goal: Patient/Family Education  Outcome: Progressing Towards Goal     Problem: TIA/CVA Stroke: Day 2 Until Discharge  Goal: Activity/Safety  Outcome: Progressing Towards Goal  Goal: Diagnostic Test/Procedures  Outcome: Progressing Towards Goal  Goal: Nutrition/Diet  Outcome: Progressing Towards Goal  Goal: Discharge Planning  Outcome: Progressing Towards Goal  Goal: Medications  Outcome: Progressing Towards Goal  Goal: Respiratory  Outcome: Progressing Towards Goal  Goal: Treatments/Interventions/Procedures  Outcome: Progressing Towards Goal  Goal: Psychosocial  Outcome: Progressing Towards Goal  Goal: *Verbalizes anxiety and depression are reduced or absent  Outcome: Progressing Towards Goal  Goal: *Absence of aspiration  Outcome: Progressing Towards Goal  Goal: *Absence of deep venous thrombosis signs and symptoms(Stroke Metric)  Outcome: Progressing Towards Goal  Goal: *Optimal pain control at patient's stated goal  Outcome: Progressing Towards Goal  Goal: *Tolerating diet  Outcome: Progressing Towards Goal  Goal: *Ability to perform ADLs and demonstrates progressive mobility and function  Outcome: Progressing Towards Goal  Goal: *Stroke education continued(Stroke Metric)  Outcome: Progressing Towards Goal     Problem: Pain  Goal: *Control of Pain  Outcome: Progressing Towards Goal     Problem: Patient Education: Go to Patient Education Activity  Goal: Patient/Family Education  Outcome: Progressing Towards Goal

## 2020-01-22 NOTE — PROGRESS NOTES
TRANSFER - IN REPORT:    Verbal report received from Jewels(name) on Kirit Echevarria  being received from S(unit) for routine progression of care      Report consisted of patients Situation, Background, Assessment and   Recommendations(SBAR). Information from the following report(s) SBAR, Kardex and Intake/Output was reviewed with the receiving nurse. Opportunity for questions and clarification was provided. Assessment completed upon patients arrival to unit and care assumed.

## 2020-01-22 NOTE — PROGRESS NOTES
LANA:    Update 16:00PM- Patient has been accepted to [facility] today.   -Accepted MD: Dr. Crow Moreno MD   -Call report to [phone]   -Completed EMTALA form   -Family will provide transportation to facility   -Will fax discharge summary/MAR to [phone]    CM available in case needs arise. YESICA Rodriguez,CRM         -Spoke with Leticia Meneses, Liaison [phone] to check referral status. [facility] accepted but has no bed availability until possibility next week. Met with patient, her son and daughter-in-law at bedside to offer choice. Family selected [facility] (1st choice) and [facility] (2nd choice). Sent referrals via [employer]; Awaiting response. -CM will arrange transportation upon discharge. CM to follow.  YESICA Rodriguez,CRM

## 2020-01-22 NOTE — PROGRESS NOTES
Problem: Falls - Risk of  Goal: *Absence of Falls  Description  Document Winnie Dillard Fall Risk and appropriate interventions in the flowsheet. Outcome: Progressing Towards Goal  Note: Fall Risk Interventions:  Mobility Interventions: Bed/chair exit alarm, Communicate number of staff needed for ambulation/transfer, Patient to call before getting OOB    Mentation Interventions: Adequate sleep, hydration, pain control, Bed/chair exit alarm, Room close to nurse's station, Reorient patient    Medication Interventions: Bed/chair exit alarm, Patient to call before getting OOB, Teach patient to arise slowly    Elimination Interventions: Bed/chair exit alarm, Call light in reach, Patient to call for help with toileting needs, Stay With Me (per policy)    History of Falls Interventions: Bed/chair exit alarm, Room close to nurse's station         Problem: Pressure Injury - Risk of  Goal: *Prevention of pressure injury  Description  Document Torsten Scale and appropriate interventions in the flowsheet.   Outcome: Progressing Towards Goal  Note: Pressure Injury Interventions:  Sensory Interventions: Assess changes in LOC, Float heels, Keep linens dry and wrinkle-free, Maintain/enhance activity level, Minimize linen layers, Monitor skin under medical devices, Pad between skin to skin    Moisture Interventions: Absorbent underpads, Minimize layers    Activity Interventions: Increase time out of bed, PT/OT evaluation    Mobility Interventions: PT/OT evaluation    Nutrition Interventions: Document food/fluid/supplement intake    Friction and Shear Interventions: Lift sheet, Minimize layers                Problem: TIA/CVA Stroke: Day 2 Until Discharge  Goal: Activity/Safety  Outcome: Progressing Towards Goal  Goal: Diagnostic Test/Procedures  Outcome: Progressing Towards Goal  Goal: Nutrition/Diet  Outcome: Progressing Towards Goal  Goal: Discharge Planning  Outcome: Progressing Towards Goal  Goal: Medications  Outcome: Progressing Towards Goal  Goal: Respiratory  Outcome: Progressing Towards Goal  Goal: Treatments/Interventions/Procedures  Outcome: Progressing Towards Goal  Goal: *Absence of aspiration  Outcome: Progressing Towards Goal  Goal: *Tolerating diet  Outcome: Progressing Towards Goal     Problem: Ischemic Stroke: Discharge Outcomes  Goal: *Hemodynamically stable  Outcome: Progressing Towards Goal  Goal: *Absence of aspiration pneumonia  Outcome: Progressing Towards Goal  Goal: *Aware of needed dietary changes  Outcome: Progressing Towards Goal     Problem: Pain  Goal: *Control of Pain  Outcome: Progressing Towards Goal

## 2020-01-22 NOTE — PROGRESS NOTES
Problem: Mobility Impaired (Adult and Pediatric)  Goal: *Acute Goals and Plan of Care (Insert Text)  Description  FUNCTIONAL STATUS PRIOR TO ADMISSION: Pt was indep with mobility, no use of device. Per son, pt primarily ambulated household distances. HOME SUPPORT PRIOR TO ADMISSION: The patient lived with  but did not require assist. Local children. Physical Therapy Goals  Initiated 1/19/2020  1. Patient will move from supine to sit and sit to supine  in bed with supervision/set-up within 7 day(s). 2.  Patient will transfer from bed to chair and chair to bed with supervision/set-up using the least restrictive device within 7 day(s). 3.  Patient will perform sit to stand with supervision/set-up within 7 day(s). 4.  Patient will ambulate with minimal assistance/contact guard assist for 150 feet with the least restrictive device within 7 day(s). 5.  Patient will ascend/descend at least 4 stairs with single handrail(s) with minimal assistance/contact guard assist within 7 day(s). Outcome: Progressing Towards Goal     PHYSICAL THERAPY TREATMENT  Patient: Matthew Blackwood (29 y.o. female)  Date: 1/22/2020  Diagnosis: Stroke (cerebrum) [facility]) [I63.9]   <principal problem not specified>       Precautions: Fall  Chart, physical therapy assessment, plan of care and goals were reviewed. ASSESSMENT  Patient continues with skilled PT services and is progressing towards goals however remains most limited by expressive>receptive aphasia, slight R inattention, impaired balance, and impaired gait leading to increased dependency and falls risk from baseline level. Emphasis today on progressive ambulation without AD  - overall CGA to min A with integration of head movements, directional changes, obstacle avoidance, multi level reaching, and dual task completion. Demos several small LOBs/scissor steps requiring assist to correct from.  Performed repeated sit<>stands with large target reaching to R side for strengthening and balance . Provided education on impairments and strategies to improve R sided attention. Remained up in chair with family present, NAD. At this time, she remains far below her baseline level and would greatly benefit from multidisciplinary rehab efforts- recommend short IPR stay prior to returning home. Current Level of Function Impacting Discharge (mobility/balance): up to min A for mobility; profound expressive aphasia     Other factors to consider for discharge: indep at [facility]          PLAN :  Patient continues to benefit from skilled intervention to address the above impairments. Continue treatment per established plan of care. to address goals. Recommendation for discharge: (in order for the patient to meet his/her long term goals)  Therapy 3 hours per day 5-7 days per week    This discharge recommendation:  Has been made in collaboration with the attending provider and/or case management    IF patient discharges home will need the following DME: to be determined (TBD)       SUBJECTIVE:   Patient stated Yes.     OBJECTIVE DATA SUMMARY:   Critical Behavior:  Neurologic State: Alert  Orientation Level: Oriented X4  Cognition: Appropriate for age attention/concentration, Appropriate safety awareness, Follows commands  Safety/Judgement: Awareness of environment, Fall prevention  Functional Mobility Training:  Bed Mobility:     Supine to Sit: Supervision     Transfers:  Sit to Stand: Contact guard assistance  Stand to Sit: Stand-by assistance          Balance:  Sitting: Intact  Standing: Impaired  Standing - Static: Good  Standing - Dynamic : Fair  Ambulation/Gait Training:     Assistive Device: Gait belt  Ambulation - Level of Assistance: Minimal assistance;Contact guard assistance  Gait Abnormalities: Path deviations;Trunk sway increased  Speed/Sonya: Fluctuations    Activity Tolerance:   Good  Please refer to the flowsheet for vital signs taken during this treatment.     After treatment patient left in no apparent distress:   Sitting in chair, Call bell within reach, and Caregiver / family present    COMMUNICATION/COLLABORATION:   The patients plan of care was discussed with: Registered Nurse    Patient was educated regarding Her deficit(s) of R inattention and aphasia  as this relates to Her diagnosis of CVA. She demonstrated Good understanding as evidenced by nodding. Patient and/or family was verbally educated on the BE FAST acronym for signs/symptoms of CVA and TIA. BE FAST was written on patient's communication board  for visual education and reinforcement. All questions answered with patient indicating good understanding.      Tod Singh, PT, DPT   Time Calculation: 23 mins

## 2020-01-23 NOTE — PROGRESS NOTES
Stroke Education documented in Patient Education: YES  Core Measures Documented in Connect [facility]:  Risk Factors: YES  Warning signs of stroke: YES  When to Activate 911: YES  Medication Education for Risk Factors: YES  Smoking cessation if applicable: YES  Written Education Given:  YES    Discharge NIH Completed: YES  Score: 7    BRAINS: YES    Follow Up Appointment Made: YES  Date/Time if applicable: see discharge   Stroke Education documented in Patient Education: YES  Core Measures Documented in Connect [facility]:  Risk Factors: YES  Warning signs of stroke: YES  When to Activate 911: YES  Medication Education for Risk Factors: YES  Smoking cessation if applicable: YES  Written Education Given:  YES    Discharge NIH Completed: YES  Score: 7    BRAINS: YES    Follow Up Appointment Made: YES  Date/Time if applicable: see discharge

## 2020-01-23 NOTE — PROGRESS NOTES
I have reviewed discharge instructions with the patient and son. The patient and son verbalized understanding. All personal belongings packed and sent with patient to [facility]. Peripheral IV removed without difficulty. Patient transported to [facility] in private vehicle with her son. Report called to [facility].

## 2020-01-28 NOTE — CDMP QUERY
Pt admitted with CVA and SDH, noted to be s/p fall. After further study, is it possible to determine the relationship, if any, between CVA, SDH and mechanical fall.  CVA/infarction- traumatic and associated with mechanical fall            SDH- traumatic and associated with mechanical fall  CVA and SDH unrelated to mechanical fall  Other, please specify  Clinically unable to determine The medical record reflects the following: 
  Risk Factors: mechanical fall, advanced age Clinical Indicators: to ED c/o AMS with L side droop and weakness  H&P- Left MCA Thromboembolic Stroke-completed- Left WALDEMAR thrombus with small area of ischemia- Sp Fall c small right occipital right scalp swelling and hematoma. (due to the above most likely)  DCS- Acute CVA- Intracranial hemorrhage- Patient with status post fall. Treatment: CT, MRI, neuro consult, PT/OT consult, d/c to rehab Thank you, Huong June RN 
[facility] 
[phone]

## 2020-01-30 ENCOUNTER — HOME HEALTH ADMISSION (OUTPATIENT)
Dept: HOME HEALTH SERVICES | Facility: HOME HEALTH | Age: 85
End: 2020-01-30
Payer: MEDICARE

## 2020-01-31 NOTE — CDMP QUERY
Pt admitted with CVA, noted to be s/p fall. After further study, is it possible to further specify the CVA as:            CVA/infarction- non-traumatic  CVA/infarction- traumatic  Other, please specify  Clinically unable to determine The medical record reflects the following: 
  Risk Factors: mechanical fall, advanced age Clinical Indicators: to ED c/o AMS with L side droop and weakness  H&P- Left MCA Thromboembolic Stroke-completed- Left WALDEMAR thrombus with small area of ischemia- Sp Fall c small right occipital right scalp swelling and hematoma. (due to the above most likely)  DCS- Acute CVA- Intracranial hemorrhage- Patient with status post fall. Treatment: CT, MRI, neuro consult, PT/OT consult, d/c to rehab Thank you, Anette Linn RN 
[facility] 
[phone]

## 2020-02-01 ENCOUNTER — HOME CARE VISIT (OUTPATIENT)
Dept: SCHEDULING | Facility: HOME HEALTH | Age: 85
End: 2020-02-01
Payer: MEDICARE

## 2020-02-01 VITALS
HEART RATE: 70 BPM | BODY MASS INDEX: 19.99 KG/M2 | HEIGHT: 65 IN | RESPIRATION RATE: 18 BRPM | TEMPERATURE: 99.1 F | DIASTOLIC BLOOD PRESSURE: 70 MMHG | SYSTOLIC BLOOD PRESSURE: 135 MMHG | OXYGEN SATURATION: 98 % | WEIGHT: 120 LBS

## 2020-02-01 PROCEDURE — 3331090001 HH PPS REVENUE CREDIT

## 2020-02-01 PROCEDURE — G0151 HHCP-SERV OF PT,EA 15 MIN: HCPCS

## 2020-02-01 PROCEDURE — 3331090002 HH PPS REVENUE DEBIT

## 2020-02-01 PROCEDURE — 400013 HH SOC

## 2020-02-02 PROCEDURE — 3331090002 HH PPS REVENUE DEBIT

## 2020-02-02 PROCEDURE — 3331090001 HH PPS REVENUE CREDIT

## 2020-02-03 ENCOUNTER — HOME CARE VISIT (OUTPATIENT)
Dept: HOME HEALTH SERVICES | Facility: HOME HEALTH | Age: 85
End: 2020-02-03
Payer: MEDICARE

## 2020-02-03 ENCOUNTER — HOME CARE VISIT (OUTPATIENT)
Dept: SCHEDULING | Facility: HOME HEALTH | Age: 85
End: 2020-02-03
Payer: MEDICARE

## 2020-02-03 VITALS
OXYGEN SATURATION: 98 % | SYSTOLIC BLOOD PRESSURE: 133 MMHG | DIASTOLIC BLOOD PRESSURE: 68 MMHG | HEART RATE: 63 BPM | TEMPERATURE: 98.9 F | RESPIRATION RATE: 18 BRPM

## 2020-02-03 PROCEDURE — 3331090002 HH PPS REVENUE DEBIT

## 2020-02-03 PROCEDURE — 3331090001 HH PPS REVENUE CREDIT

## 2020-02-03 PROCEDURE — G0153 HHCP-SVS OF S/L PATH,EA 15MN: HCPCS

## 2020-02-04 ENCOUNTER — HOME CARE VISIT (OUTPATIENT)
Dept: SCHEDULING | Facility: HOME HEALTH | Age: 85
End: 2020-02-04
Payer: MEDICARE

## 2020-02-04 VITALS
DIASTOLIC BLOOD PRESSURE: 60 MMHG | TEMPERATURE: 98.5 F | RESPIRATION RATE: 18 BRPM | SYSTOLIC BLOOD PRESSURE: 110 MMHG | HEART RATE: 70 BPM | OXYGEN SATURATION: 98 %

## 2020-02-04 PROCEDURE — 3331090002 HH PPS REVENUE DEBIT

## 2020-02-04 PROCEDURE — G0151 HHCP-SERV OF PT,EA 15 MIN: HCPCS

## 2020-02-04 PROCEDURE — 3331090001 HH PPS REVENUE CREDIT

## 2020-02-05 ENCOUNTER — HOME CARE VISIT (OUTPATIENT)
Dept: SCHEDULING | Facility: HOME HEALTH | Age: 85
End: 2020-02-05
Payer: MEDICARE

## 2020-02-05 PROCEDURE — G0153 HHCP-SVS OF S/L PATH,EA 15MN: HCPCS

## 2020-02-05 PROCEDURE — 3331090002 HH PPS REVENUE DEBIT

## 2020-02-05 PROCEDURE — 3331090001 HH PPS REVENUE CREDIT

## 2020-02-06 ENCOUNTER — HOME CARE VISIT (OUTPATIENT)
Dept: SCHEDULING | Facility: HOME HEALTH | Age: 85
End: 2020-02-06
Payer: MEDICARE

## 2020-02-06 VITALS
SYSTOLIC BLOOD PRESSURE: 132 MMHG | OXYGEN SATURATION: 98 % | TEMPERATURE: 98.6 F | HEART RATE: 60 BPM | RESPIRATION RATE: 18 BRPM | DIASTOLIC BLOOD PRESSURE: 74 MMHG

## 2020-02-06 VITALS
SYSTOLIC BLOOD PRESSURE: 120 MMHG | TEMPERATURE: 98.3 F | DIASTOLIC BLOOD PRESSURE: 60 MMHG | HEART RATE: 70 BPM | RESPIRATION RATE: 18 BRPM | OXYGEN SATURATION: 98 %

## 2020-02-06 PROCEDURE — 3331090002 HH PPS REVENUE DEBIT

## 2020-02-06 PROCEDURE — 3331090001 HH PPS REVENUE CREDIT

## 2020-02-06 PROCEDURE — G0151 HHCP-SERV OF PT,EA 15 MIN: HCPCS

## 2020-02-07 PROCEDURE — 3331090001 HH PPS REVENUE CREDIT

## 2020-02-07 PROCEDURE — 3331090002 HH PPS REVENUE DEBIT

## 2020-02-08 PROCEDURE — 3331090002 HH PPS REVENUE DEBIT

## 2020-02-08 PROCEDURE — 3331090001 HH PPS REVENUE CREDIT

## 2020-02-09 PROCEDURE — 3331090002 HH PPS REVENUE DEBIT

## 2020-02-09 PROCEDURE — 3331090001 HH PPS REVENUE CREDIT

## 2020-02-10 ENCOUNTER — HOME CARE VISIT (OUTPATIENT)
Dept: SCHEDULING | Facility: HOME HEALTH | Age: 85
End: 2020-02-10
Payer: MEDICARE

## 2020-02-10 VITALS
OXYGEN SATURATION: 98 % | RESPIRATION RATE: 18 BRPM | HEART RATE: 70 BPM | DIASTOLIC BLOOD PRESSURE: 60 MMHG | SYSTOLIC BLOOD PRESSURE: 120 MMHG | TEMPERATURE: 99.1 F

## 2020-02-10 PROCEDURE — 3331090001 HH PPS REVENUE CREDIT

## 2020-02-10 PROCEDURE — G0151 HHCP-SERV OF PT,EA 15 MIN: HCPCS

## 2020-02-10 PROCEDURE — 3331090002 HH PPS REVENUE DEBIT

## 2020-02-11 ENCOUNTER — HOME CARE VISIT (OUTPATIENT)
Dept: SCHEDULING | Facility: HOME HEALTH | Age: 85
End: 2020-02-11
Payer: MEDICARE

## 2020-02-11 VITALS
HEART RATE: 63 BPM | DIASTOLIC BLOOD PRESSURE: 78 MMHG | OXYGEN SATURATION: 98 % | RESPIRATION RATE: 18 BRPM | TEMPERATURE: 98.4 F | SYSTOLIC BLOOD PRESSURE: 149 MMHG

## 2020-02-11 PROCEDURE — 3331090001 HH PPS REVENUE CREDIT

## 2020-02-11 PROCEDURE — G0153 HHCP-SVS OF S/L PATH,EA 15MN: HCPCS

## 2020-02-11 PROCEDURE — 3331090002 HH PPS REVENUE DEBIT

## 2020-02-12 PROCEDURE — 3331090002 HH PPS REVENUE DEBIT

## 2020-02-12 PROCEDURE — 3331090001 HH PPS REVENUE CREDIT

## 2020-02-13 ENCOUNTER — HOME CARE VISIT (OUTPATIENT)
Dept: SCHEDULING | Facility: HOME HEALTH | Age: 85
End: 2020-02-13
Payer: MEDICARE

## 2020-02-13 ENCOUNTER — HOME CARE VISIT (OUTPATIENT)
Dept: HOME HEALTH SERVICES | Facility: HOME HEALTH | Age: 85
End: 2020-02-13
Payer: MEDICARE

## 2020-02-13 PROCEDURE — G0153 HHCP-SVS OF S/L PATH,EA 15MN: HCPCS

## 2020-02-13 PROCEDURE — 3331090001 HH PPS REVENUE CREDIT

## 2020-02-13 PROCEDURE — 3331090002 HH PPS REVENUE DEBIT

## 2020-02-14 ENCOUNTER — HOME CARE VISIT (OUTPATIENT)
Dept: SCHEDULING | Facility: HOME HEALTH | Age: 85
End: 2020-02-14
Payer: MEDICARE

## 2020-02-14 VITALS
HEART RATE: 61 BPM | DIASTOLIC BLOOD PRESSURE: 62 MMHG | SYSTOLIC BLOOD PRESSURE: 132 MMHG | RESPIRATION RATE: 18 BRPM | OXYGEN SATURATION: 96 % | TEMPERATURE: 98 F

## 2020-02-14 VITALS
OXYGEN SATURATION: 98 % | SYSTOLIC BLOOD PRESSURE: 120 MMHG | DIASTOLIC BLOOD PRESSURE: 60 MMHG | RESPIRATION RATE: 18 BRPM | TEMPERATURE: 99.5 F | HEART RATE: 70 BPM

## 2020-02-14 PROCEDURE — 3331090002 HH PPS REVENUE DEBIT

## 2020-02-14 PROCEDURE — 3331090001 HH PPS REVENUE CREDIT

## 2020-02-14 PROCEDURE — G0151 HHCP-SERV OF PT,EA 15 MIN: HCPCS

## 2020-02-15 PROCEDURE — 3331090002 HH PPS REVENUE DEBIT

## 2020-02-15 PROCEDURE — 3331090001 HH PPS REVENUE CREDIT

## 2020-02-16 PROCEDURE — 3331090001 HH PPS REVENUE CREDIT

## 2020-02-16 PROCEDURE — 3331090002 HH PPS REVENUE DEBIT

## 2020-02-17 ENCOUNTER — HOME CARE VISIT (OUTPATIENT)
Dept: SCHEDULING | Facility: HOME HEALTH | Age: 85
End: 2020-02-17
Payer: MEDICARE

## 2020-02-17 VITALS
SYSTOLIC BLOOD PRESSURE: 120 MMHG | RESPIRATION RATE: 18 BRPM | OXYGEN SATURATION: 98 % | HEART RATE: 82 BPM | TEMPERATURE: 99.8 F | DIASTOLIC BLOOD PRESSURE: 60 MMHG

## 2020-02-17 PROCEDURE — 3331090001 HH PPS REVENUE CREDIT

## 2020-02-17 PROCEDURE — G0151 HHCP-SERV OF PT,EA 15 MIN: HCPCS

## 2020-02-17 PROCEDURE — 3331090002 HH PPS REVENUE DEBIT

## 2020-02-18 ENCOUNTER — HOME CARE VISIT (OUTPATIENT)
Dept: SCHEDULING | Facility: HOME HEALTH | Age: 85
End: 2020-02-18
Payer: MEDICARE

## 2020-02-18 ENCOUNTER — HOME CARE VISIT (OUTPATIENT)
Dept: HOME HEALTH SERVICES | Facility: HOME HEALTH | Age: 85
End: 2020-02-18
Payer: MEDICARE

## 2020-02-18 VITALS
DIASTOLIC BLOOD PRESSURE: 60 MMHG | OXYGEN SATURATION: 98 % | TEMPERATURE: 98.6 F | HEART RATE: 56 BPM | RESPIRATION RATE: 18 BRPM | SYSTOLIC BLOOD PRESSURE: 128 MMHG

## 2020-02-18 PROCEDURE — 3331090001 HH PPS REVENUE CREDIT

## 2020-02-18 PROCEDURE — G0153 HHCP-SVS OF S/L PATH,EA 15MN: HCPCS

## 2020-02-18 PROCEDURE — 3331090002 HH PPS REVENUE DEBIT

## 2020-02-19 ENCOUNTER — HOME CARE VISIT (OUTPATIENT)
Dept: SCHEDULING | Facility: HOME HEALTH | Age: 85
End: 2020-02-19
Payer: MEDICARE

## 2020-02-19 VITALS
TEMPERATURE: 98.5 F | HEART RATE: 62 BPM | SYSTOLIC BLOOD PRESSURE: 120 MMHG | DIASTOLIC BLOOD PRESSURE: 60 MMHG | RESPIRATION RATE: 18 BRPM | OXYGEN SATURATION: 96 %

## 2020-02-19 PROCEDURE — G0151 HHCP-SERV OF PT,EA 15 MIN: HCPCS

## 2020-02-19 PROCEDURE — 3331090001 HH PPS REVENUE CREDIT

## 2020-02-19 PROCEDURE — 3331090002 HH PPS REVENUE DEBIT

## 2020-02-20 ENCOUNTER — HOME CARE VISIT (OUTPATIENT)
Dept: HOME HEALTH SERVICES | Facility: HOME HEALTH | Age: 85
End: 2020-02-20
Payer: MEDICARE

## 2020-02-20 PROCEDURE — 3331090002 HH PPS REVENUE DEBIT

## 2020-02-20 PROCEDURE — 3331090001 HH PPS REVENUE CREDIT

## 2020-02-21 PROCEDURE — 3331090002 HH PPS REVENUE DEBIT

## 2020-02-21 PROCEDURE — 3331090001 HH PPS REVENUE CREDIT

## 2020-02-22 PROCEDURE — 3331090002 HH PPS REVENUE DEBIT

## 2020-02-22 PROCEDURE — 3331090001 HH PPS REVENUE CREDIT

## 2020-02-23 PROCEDURE — 3331090001 HH PPS REVENUE CREDIT

## 2020-02-23 PROCEDURE — 3331090002 HH PPS REVENUE DEBIT

## 2020-02-24 PROCEDURE — 3331090002 HH PPS REVENUE DEBIT

## 2020-02-24 PROCEDURE — 3331090001 HH PPS REVENUE CREDIT

## 2020-02-25 PROCEDURE — 3331090001 HH PPS REVENUE CREDIT

## 2020-02-25 PROCEDURE — 3331090002 HH PPS REVENUE DEBIT

## 2020-02-26 ENCOUNTER — HOME CARE VISIT (OUTPATIENT)
Dept: SCHEDULING | Facility: HOME HEALTH | Age: 85
End: 2020-02-26
Payer: MEDICARE

## 2020-02-26 ENCOUNTER — HOME CARE VISIT (OUTPATIENT)
Dept: HOME HEALTH SERVICES | Facility: HOME HEALTH | Age: 85
End: 2020-02-26
Payer: MEDICARE

## 2020-02-26 VITALS
TEMPERATURE: 99.3 F | DIASTOLIC BLOOD PRESSURE: 60 MMHG | RESPIRATION RATE: 18 BRPM | HEART RATE: 68 BPM | OXYGEN SATURATION: 98 % | SYSTOLIC BLOOD PRESSURE: 110 MMHG

## 2020-02-26 PROCEDURE — 3331090002 HH PPS REVENUE DEBIT

## 2020-02-26 PROCEDURE — 3331090001 HH PPS REVENUE CREDIT

## 2020-02-26 PROCEDURE — G0151 HHCP-SERV OF PT,EA 15 MIN: HCPCS

## 2020-02-27 ENCOUNTER — HOME CARE VISIT (OUTPATIENT)
Dept: HOME HEALTH SERVICES | Facility: HOME HEALTH | Age: 85
End: 2020-02-27
Payer: MEDICARE

## 2020-02-27 PROCEDURE — 3331090002 HH PPS REVENUE DEBIT

## 2020-02-27 PROCEDURE — 3331090001 HH PPS REVENUE CREDIT

## 2020-02-27 PROCEDURE — G0153 HHCP-SVS OF S/L PATH,EA 15MN: HCPCS

## 2020-02-28 ENCOUNTER — HOME CARE VISIT (OUTPATIENT)
Dept: SCHEDULING | Facility: HOME HEALTH | Age: 85
End: 2020-02-28
Payer: MEDICARE

## 2020-02-28 VITALS
HEART RATE: 59 BPM | OXYGEN SATURATION: 96 % | DIASTOLIC BLOOD PRESSURE: 51 MMHG | SYSTOLIC BLOOD PRESSURE: 124 MMHG | TEMPERATURE: 99 F

## 2020-02-28 PROCEDURE — 3331090002 HH PPS REVENUE DEBIT

## 2020-02-28 PROCEDURE — 3331090001 HH PPS REVENUE CREDIT

## 2020-02-28 PROCEDURE — G0151 HHCP-SERV OF PT,EA 15 MIN: HCPCS

## 2020-02-29 VITALS
RESPIRATION RATE: 18 BRPM | SYSTOLIC BLOOD PRESSURE: 120 MMHG | OXYGEN SATURATION: 95 % | DIASTOLIC BLOOD PRESSURE: 60 MMHG | TEMPERATURE: 99.8 F | HEART RATE: 54 BPM

## 2020-02-29 PROCEDURE — 3331090001 HH PPS REVENUE CREDIT

## 2020-02-29 PROCEDURE — 3331090002 HH PPS REVENUE DEBIT

## 2020-03-01 PROCEDURE — 3331090002 HH PPS REVENUE DEBIT

## 2020-03-01 PROCEDURE — 3331090001 HH PPS REVENUE CREDIT

## 2021-01-05 ENCOUNTER — HOME HEALTH ADMISSION (OUTPATIENT)
Dept: HOME HEALTH SERVICES | Facility: HOME HEALTH | Age: 86
End: 2021-01-05
Payer: MEDICARE

## 2021-01-06 ENCOUNTER — HOME CARE VISIT (OUTPATIENT)
Dept: SCHEDULING | Facility: HOME HEALTH | Age: 86
End: 2021-01-06
Payer: MEDICARE

## 2021-01-06 VITALS
DIASTOLIC BLOOD PRESSURE: 60 MMHG | HEART RATE: 68 BPM | TEMPERATURE: 98.3 F | OXYGEN SATURATION: 98 % | RESPIRATION RATE: 18 BRPM | SYSTOLIC BLOOD PRESSURE: 110 MMHG

## 2021-01-06 PROCEDURE — 3331090001 HH PPS REVENUE CREDIT

## 2021-01-06 PROCEDURE — G0151 HHCP-SERV OF PT,EA 15 MIN: HCPCS

## 2021-01-06 PROCEDURE — 400018 HH-NO PAY CLAIM PROCEDURE

## 2021-01-06 PROCEDURE — 3331090002 HH PPS REVENUE DEBIT

## 2021-01-06 PROCEDURE — 400013 HH SOC

## 2021-01-07 PROCEDURE — 3331090001 HH PPS REVENUE CREDIT

## 2021-01-07 PROCEDURE — 3331090002 HH PPS REVENUE DEBIT

## 2021-01-08 ENCOUNTER — HOME CARE VISIT (OUTPATIENT)
Dept: SCHEDULING | Facility: HOME HEALTH | Age: 86
End: 2021-01-08
Payer: MEDICARE

## 2021-01-08 VITALS
TEMPERATURE: 98.8 F | DIASTOLIC BLOOD PRESSURE: 70 MMHG | OXYGEN SATURATION: 98 % | HEART RATE: 70 BPM | SYSTOLIC BLOOD PRESSURE: 120 MMHG | RESPIRATION RATE: 18 BRPM

## 2021-01-08 PROCEDURE — 3331090001 HH PPS REVENUE CREDIT

## 2021-01-08 PROCEDURE — 3331090002 HH PPS REVENUE DEBIT

## 2021-01-08 PROCEDURE — G0151 HHCP-SERV OF PT,EA 15 MIN: HCPCS

## 2021-01-09 PROCEDURE — 3331090001 HH PPS REVENUE CREDIT

## 2021-01-09 PROCEDURE — 3331090002 HH PPS REVENUE DEBIT

## 2021-01-10 PROCEDURE — 3331090002 HH PPS REVENUE DEBIT

## 2021-01-10 PROCEDURE — 3331090001 HH PPS REVENUE CREDIT

## 2021-01-11 ENCOUNTER — HOME CARE VISIT (OUTPATIENT)
Dept: SCHEDULING | Facility: HOME HEALTH | Age: 86
End: 2021-01-11
Payer: MEDICARE

## 2021-01-11 VITALS
TEMPERATURE: 98.5 F | RESPIRATION RATE: 18 BRPM | DIASTOLIC BLOOD PRESSURE: 60 MMHG | SYSTOLIC BLOOD PRESSURE: 120 MMHG | OXYGEN SATURATION: 98 % | HEART RATE: 68 BPM

## 2021-01-11 PROCEDURE — G0151 HHCP-SERV OF PT,EA 15 MIN: HCPCS

## 2021-01-11 PROCEDURE — 3331090001 HH PPS REVENUE CREDIT

## 2021-01-11 PROCEDURE — 3331090002 HH PPS REVENUE DEBIT

## 2021-01-12 PROCEDURE — 3331090001 HH PPS REVENUE CREDIT

## 2021-01-12 PROCEDURE — 3331090002 HH PPS REVENUE DEBIT

## 2021-01-13 PROCEDURE — 3331090001 HH PPS REVENUE CREDIT

## 2021-01-13 PROCEDURE — 3331090002 HH PPS REVENUE DEBIT

## 2021-01-14 ENCOUNTER — HOME CARE VISIT (OUTPATIENT)
Dept: SCHEDULING | Facility: HOME HEALTH | Age: 86
End: 2021-01-14
Payer: MEDICARE

## 2021-01-14 PROCEDURE — G0151 HHCP-SERV OF PT,EA 15 MIN: HCPCS

## 2021-01-14 PROCEDURE — 3331090002 HH PPS REVENUE DEBIT

## 2021-01-14 PROCEDURE — 3331090001 HH PPS REVENUE CREDIT

## 2021-01-15 PROCEDURE — 3331090002 HH PPS REVENUE DEBIT

## 2021-01-15 PROCEDURE — 3331090001 HH PPS REVENUE CREDIT

## 2021-01-16 PROCEDURE — 3331090002 HH PPS REVENUE DEBIT

## 2021-01-16 PROCEDURE — 3331090001 HH PPS REVENUE CREDIT

## 2021-01-17 PROCEDURE — 3331090001 HH PPS REVENUE CREDIT

## 2021-01-17 PROCEDURE — 3331090002 HH PPS REVENUE DEBIT

## 2021-01-18 ENCOUNTER — HOME CARE VISIT (OUTPATIENT)
Dept: SCHEDULING | Facility: HOME HEALTH | Age: 86
End: 2021-01-18
Payer: MEDICARE

## 2021-01-18 VITALS
OXYGEN SATURATION: 98 % | TEMPERATURE: 98.1 F | HEART RATE: 54 BPM | RESPIRATION RATE: 18 BRPM | SYSTOLIC BLOOD PRESSURE: 120 MMHG | DIASTOLIC BLOOD PRESSURE: 60 MMHG

## 2021-01-18 PROCEDURE — 3331090002 HH PPS REVENUE DEBIT

## 2021-01-18 PROCEDURE — G0151 HHCP-SERV OF PT,EA 15 MIN: HCPCS

## 2021-01-18 PROCEDURE — 3331090001 HH PPS REVENUE CREDIT

## 2021-01-19 PROCEDURE — 3331090001 HH PPS REVENUE CREDIT

## 2021-01-19 PROCEDURE — 3331090002 HH PPS REVENUE DEBIT

## 2021-01-20 PROCEDURE — 3331090002 HH PPS REVENUE DEBIT

## 2021-01-20 PROCEDURE — 3331090001 HH PPS REVENUE CREDIT

## 2021-01-21 ENCOUNTER — HOME CARE VISIT (OUTPATIENT)
Dept: SCHEDULING | Facility: HOME HEALTH | Age: 86
End: 2021-01-21
Payer: MEDICARE

## 2021-01-21 VITALS
DIASTOLIC BLOOD PRESSURE: 60 MMHG | RESPIRATION RATE: 18 BRPM | OXYGEN SATURATION: 98 % | SYSTOLIC BLOOD PRESSURE: 120 MMHG | HEART RATE: 64 BPM | TEMPERATURE: 98.4 F

## 2021-01-21 PROCEDURE — G0151 HHCP-SERV OF PT,EA 15 MIN: HCPCS

## 2021-01-21 PROCEDURE — 3331090001 HH PPS REVENUE CREDIT

## 2021-01-21 PROCEDURE — 3331090002 HH PPS REVENUE DEBIT

## 2021-01-22 PROCEDURE — 3331090001 HH PPS REVENUE CREDIT

## 2021-01-22 PROCEDURE — 3331090002 HH PPS REVENUE DEBIT

## 2021-01-23 PROCEDURE — 3331090001 HH PPS REVENUE CREDIT

## 2021-01-23 PROCEDURE — 3331090002 HH PPS REVENUE DEBIT

## 2021-01-24 PROCEDURE — 3331090002 HH PPS REVENUE DEBIT

## 2021-01-24 PROCEDURE — 3331090001 HH PPS REVENUE CREDIT

## 2021-01-25 ENCOUNTER — HOME CARE VISIT (OUTPATIENT)
Dept: SCHEDULING | Facility: HOME HEALTH | Age: 86
End: 2021-01-25
Payer: MEDICARE

## 2021-01-25 VITALS
SYSTOLIC BLOOD PRESSURE: 120 MMHG | OXYGEN SATURATION: 99 % | HEART RATE: 66 BPM | DIASTOLIC BLOOD PRESSURE: 60 MMHG | RESPIRATION RATE: 18 BRPM | TEMPERATURE: 98.5 F

## 2021-01-25 PROCEDURE — G0151 HHCP-SERV OF PT,EA 15 MIN: HCPCS

## 2021-01-25 PROCEDURE — 3331090001 HH PPS REVENUE CREDIT

## 2021-01-25 PROCEDURE — 3331090002 HH PPS REVENUE DEBIT

## 2021-01-26 PROCEDURE — 3331090001 HH PPS REVENUE CREDIT

## 2021-01-26 PROCEDURE — 3331090002 HH PPS REVENUE DEBIT

## 2021-01-27 PROCEDURE — 3331090002 HH PPS REVENUE DEBIT

## 2021-01-27 PROCEDURE — 3331090001 HH PPS REVENUE CREDIT

## 2021-01-28 ENCOUNTER — HOME CARE VISIT (OUTPATIENT)
Dept: SCHEDULING | Facility: HOME HEALTH | Age: 86
End: 2021-01-28
Payer: MEDICARE

## 2021-01-28 VITALS
HEART RATE: 67 BPM | DIASTOLIC BLOOD PRESSURE: 60 MMHG | TEMPERATURE: 98.5 F | SYSTOLIC BLOOD PRESSURE: 110 MMHG | RESPIRATION RATE: 18 BRPM | OXYGEN SATURATION: 98 %

## 2021-01-28 PROCEDURE — G0151 HHCP-SERV OF PT,EA 15 MIN: HCPCS

## 2021-01-28 PROCEDURE — 3331090002 HH PPS REVENUE DEBIT

## 2021-01-28 PROCEDURE — 3331090001 HH PPS REVENUE CREDIT

## 2021-01-29 PROCEDURE — 3331090001 HH PPS REVENUE CREDIT

## 2021-01-29 PROCEDURE — 3331090002 HH PPS REVENUE DEBIT

## 2021-01-30 PROCEDURE — 3331090002 HH PPS REVENUE DEBIT

## 2021-01-30 PROCEDURE — 3331090001 HH PPS REVENUE CREDIT

## 2021-01-31 PROCEDURE — 3331090001 HH PPS REVENUE CREDIT

## 2021-01-31 PROCEDURE — 3331090002 HH PPS REVENUE DEBIT

## 2021-02-01 ENCOUNTER — HOME CARE VISIT (OUTPATIENT)
Dept: SCHEDULING | Facility: HOME HEALTH | Age: 86
End: 2021-02-01
Payer: MEDICARE

## 2021-02-01 VITALS
OXYGEN SATURATION: 96 % | SYSTOLIC BLOOD PRESSURE: 130 MMHG | HEART RATE: 70 BPM | RESPIRATION RATE: 18 BRPM | TEMPERATURE: 98.3 F | DIASTOLIC BLOOD PRESSURE: 70 MMHG

## 2021-02-01 PROCEDURE — G0151 HHCP-SERV OF PT,EA 15 MIN: HCPCS

## 2021-02-01 PROCEDURE — 3331090002 HH PPS REVENUE DEBIT

## 2021-02-01 PROCEDURE — 3331090001 HH PPS REVENUE CREDIT

## 2021-02-02 PROCEDURE — 3331090001 HH PPS REVENUE CREDIT

## 2021-02-02 PROCEDURE — 3331090002 HH PPS REVENUE DEBIT

## 2021-02-03 PROCEDURE — 3331090001 HH PPS REVENUE CREDIT

## 2021-02-03 PROCEDURE — 3331090002 HH PPS REVENUE DEBIT

## 2021-02-04 ENCOUNTER — HOME CARE VISIT (OUTPATIENT)
Dept: SCHEDULING | Facility: HOME HEALTH | Age: 86
End: 2021-02-04
Payer: MEDICARE

## 2021-02-04 VITALS
TEMPERATURE: 98.1 F | DIASTOLIC BLOOD PRESSURE: 60 MMHG | HEART RATE: 68 BPM | RESPIRATION RATE: 18 BRPM | SYSTOLIC BLOOD PRESSURE: 110 MMHG | OXYGEN SATURATION: 97 %

## 2021-02-04 PROCEDURE — 3331090001 HH PPS REVENUE CREDIT

## 2021-02-04 PROCEDURE — G0151 HHCP-SERV OF PT,EA 15 MIN: HCPCS

## 2021-02-04 PROCEDURE — 3331090002 HH PPS REVENUE DEBIT

## 2021-02-05 PROCEDURE — 3331090001 HH PPS REVENUE CREDIT

## 2021-02-05 PROCEDURE — 400018 HH-NO PAY CLAIM PROCEDURE

## 2021-02-05 PROCEDURE — 3331090002 HH PPS REVENUE DEBIT

## 2021-02-06 PROCEDURE — 3331090001 HH PPS REVENUE CREDIT

## 2021-02-06 PROCEDURE — 3331090002 HH PPS REVENUE DEBIT

## 2021-02-07 PROCEDURE — 3331090001 HH PPS REVENUE CREDIT

## 2021-02-07 PROCEDURE — 3331090002 HH PPS REVENUE DEBIT

## 2021-02-08 ENCOUNTER — HOME CARE VISIT (OUTPATIENT)
Dept: SCHEDULING | Facility: HOME HEALTH | Age: 86
End: 2021-02-08
Payer: MEDICARE

## 2021-02-08 VITALS
HEART RATE: 68 BPM | TEMPERATURE: 98.4 F | RESPIRATION RATE: 18 BRPM | SYSTOLIC BLOOD PRESSURE: 120 MMHG | DIASTOLIC BLOOD PRESSURE: 60 MMHG | OXYGEN SATURATION: 98 %

## 2021-02-08 PROCEDURE — G0151 HHCP-SERV OF PT,EA 15 MIN: HCPCS

## 2021-02-08 PROCEDURE — 3331090002 HH PPS REVENUE DEBIT

## 2021-02-08 PROCEDURE — 400013 HH SOC

## 2021-02-08 PROCEDURE — 3331090001 HH PPS REVENUE CREDIT

## 2021-02-09 PROCEDURE — 3331090001 HH PPS REVENUE CREDIT

## 2021-02-09 PROCEDURE — 3331090002 HH PPS REVENUE DEBIT

## 2021-02-10 ENCOUNTER — HOME CARE VISIT (OUTPATIENT)
Dept: SCHEDULING | Facility: HOME HEALTH | Age: 86
End: 2021-02-10
Payer: MEDICARE

## 2021-02-10 VITALS
DIASTOLIC BLOOD PRESSURE: 60 MMHG | SYSTOLIC BLOOD PRESSURE: 120 MMHG | HEART RATE: 58 BPM | OXYGEN SATURATION: 98 % | TEMPERATURE: 98.3 F | RESPIRATION RATE: 18 BRPM

## 2021-02-10 PROCEDURE — 3331090001 HH PPS REVENUE CREDIT

## 2021-02-10 PROCEDURE — G0151 HHCP-SERV OF PT,EA 15 MIN: HCPCS

## 2021-02-10 PROCEDURE — 3331090002 HH PPS REVENUE DEBIT

## 2021-10-06 NOTE — PROGRESS NOTES
Problem: Self Care Deficits Care Plan (Adult)  Goal: *Acute Goals and Plan of Care (Insert Text)  Description  FUNCTIONAL STATUS PRIOR TO ADMISSION: Patient was independent and active without use of DME. Has a cane and RW but does not use them regularly. HOME SUPPORT: The patient lived with  but did not require assist. Patient has good supportive family in area. Occupational Therapy Goals  Initiated 1/19/2020   1. Patient will perform grooming standing at sink for 10 minutes with supervision/set-up within 7 day(s). 2.  Patient will perform bathing using most appropriate DME with supervision/set-up within 7 day(s). 3.  Patient will perform lower body dressing with supervision/set-up within 7 day(s). 4.  Patient will perform toilet transfers with supervision/set-up within 7 day(s). 5.  Patient will perform all aspects of toileting with supervision/set-up within 7 day(s). 6.  Patient will participate in upper extremity therapeutic exercise/activities with supervision/set-up for 5 minutes within 7 day(s). 7.  Patient will utilize energy conservation techniques during functional activities with verbal cues within 7 day(s). Outcome: Progressing Towards Goal     OCCUPATIONAL THERAPY TREATMENT  Patient: Fawad Cali (38 y.o. 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 ASSESSMENT  Patient continues with skilled OT services and is progressing towards goals. Patient is below functional baseline s/p acute L MCA CVA and presents with expressive > receptive aphasia, impaired standing balance, impaired short-term memory, and impaired task planning/ sequencing/ execution.   Patient mobilized with contact guard assistance for toilet transfer and standing ADLs, but required maximum verbal and visual cues to remember and complete 4 planned Pt called with lab results   ADLs (toileting, washing face, washing hands, and brushing teeth). Recommend inpatient rehab at d/c to maximize patient's functional/ neurological recovery. Current Level of Function Impacting Discharge (ADLs): contact guard assistance, maximum multimodal cues         PLAN :  Patient continues to benefit from skilled intervention to address the above impairments. Continue treatment per established plan of care. to address goals. Recommendation for discharge: (in order for the patient to meet his/her long term goals)  Therapy 3 hours per day 5-7 days per week    This discharge recommendation:  Has been made in collaboration with the attending provider and/or case management       SUBJECTIVE:   Patient stated After stroke, still need humor.     OBJECTIVE DATA SUMMARY:   Cognitive/Behavioral Status:  Neurologic State: Alert  Orientation Level: Oriented X4  Cognition: Appropriate decision making; Appropriate for age attention/concentration; Appropriate safety awareness      Functional Mobility and Transfers for ADLs:    Transfers:  Sit to Stand: Contact guard assistance  Functional Transfers  Toilet Transfer : Contact guard assistance  Bed to Chair: Contact guard assistance    Balance:  Sitting: Intact  Standing: Impaired  Standing - Static: Fair;Occasional  Standing - Dynamic : Fair;Occasional    ADL Intervention:       Grooming  Position Performed: Standing  Washing Face: Contact guard assistance  Washing Hands: Contact guard assistance  Brushing Teeth: Contact guard assistance       Lower Body Dressing Assistance  Socks: Set-up(seated in chair, tailor sitting)    Toileting  Bladder Hygiene: Contact guard assistance       Pain:  Patient did not indicate pain    Activity Tolerance:   Good    After treatment patient left in no apparent distress:   Sitting in chair, Chair alarm activated, family present, Call bell left within reach    COMMUNICATION/COLLABORATION:   The patients plan of care was discussed with: Physical Therapist and Registered Nurse. Patient was educated regarding Her deficit(s) of aphasia and impaired balance as this relates to Her diagnosis of CVA. She demonstrated Fair understanding as evidenced by verbalization. Patient and/or family was verbally educated on the BE FAST acronym for signs/symptoms of CVA and TIA. Provided with BEFAST handout. All questions answered with patient indicating fair understanding.      Mila Funez OT  Time Calculation: 38 mins

## 2022-03-18 PROBLEM — I63.9 STROKE (CEREBRUM) (HCC): Status: ACTIVE | Noted: 2020-01-18

## 2022-03-19 PROBLEM — R47.01 EXPRESSIVE APHASIA: Status: ACTIVE | Noted: 2020-01-20

## 2022-03-19 PROBLEM — E78.5 HYPERLIPIDEMIA: Status: ACTIVE | Noted: 2020-01-20

## 2022-03-19 PROBLEM — E03.9 HYPOTHYROIDISM: Status: ACTIVE | Noted: 2020-01-20

## 2022-03-20 PROBLEM — N39.0 UTI (URINARY TRACT INFECTION): Status: ACTIVE | Noted: 2018-07-19

## 2022-07-03 ENCOUNTER — HOSPITAL ENCOUNTER (INPATIENT)
Age: 87
LOS: 1 days | Discharge: HOME HEALTH | DRG: 177 | End: 2022-07-04
Attending: STUDENT IN AN ORGANIZED HEALTH CARE EDUCATION/TRAINING PROGRAM | Admitting: INTERNAL MEDICINE
Payer: MEDICARE

## 2022-07-03 ENCOUNTER — APPOINTMENT (OUTPATIENT)
Dept: CT IMAGING | Age: 87
DRG: 177 | End: 2022-07-03
Attending: EMERGENCY MEDICINE
Payer: MEDICARE

## 2022-07-03 ENCOUNTER — APPOINTMENT (OUTPATIENT)
Dept: GENERAL RADIOLOGY | Age: 87
DRG: 177 | End: 2022-07-03
Attending: STUDENT IN AN ORGANIZED HEALTH CARE EDUCATION/TRAINING PROGRAM
Payer: MEDICARE

## 2022-07-03 DIAGNOSIS — R50.9 ACUTE FEBRILE ILLNESS: ICD-10-CM

## 2022-07-03 DIAGNOSIS — R41.0 CONFUSION: Primary | ICD-10-CM

## 2022-07-03 DIAGNOSIS — R82.81 PYURIA: ICD-10-CM

## 2022-07-03 PROBLEM — R41.82 ALTERED MENTAL STATUS: Status: ACTIVE | Noted: 2022-07-03

## 2022-07-03 PROBLEM — U07.1 COVID-19: Status: ACTIVE | Noted: 2022-07-03

## 2022-07-03 LAB
ALBUMIN SERPL-MCNC: 3.7 G/DL (ref 3.5–5)
ALBUMIN/GLOB SERPL: 1 {RATIO} (ref 1.1–2.2)
ALP SERPL-CCNC: 74 U/L (ref 45–117)
ALT SERPL-CCNC: 24 U/L (ref 12–78)
ANION GAP SERPL CALC-SCNC: 2 MMOL/L (ref 5–15)
APPEARANCE UR: ABNORMAL
AST SERPL-CCNC: 32 U/L (ref 15–37)
BACTERIA URNS QL MICRO: NEGATIVE /HPF
BASOPHILS # BLD: 0 K/UL (ref 0–0.1)
BASOPHILS NFR BLD: 0 % (ref 0–1)
BILIRUB SERPL-MCNC: 0.4 MG/DL (ref 0.2–1)
BILIRUB UR QL: NEGATIVE
BUN SERPL-MCNC: 28 MG/DL (ref 6–20)
BUN/CREAT SERPL: 21 (ref 12–20)
CALCIUM SERPL-MCNC: 9.3 MG/DL (ref 8.5–10.1)
CHLORIDE SERPL-SCNC: 106 MMOL/L (ref 97–108)
CO2 SERPL-SCNC: 31 MMOL/L (ref 21–32)
COLOR UR: ABNORMAL
COMMENT, HOLDF: NORMAL
COVID-19 RAPID TEST, COVR: DETECTED
CREAT SERPL-MCNC: 1.33 MG/DL (ref 0.55–1.02)
CRP SERPL-MCNC: 1.04 MG/DL (ref 0–0.6)
D DIMER PPP FEU-MCNC: 1.53 MG/L FEU (ref 0–0.65)
DIFFERENTIAL METHOD BLD: ABNORMAL
EOSINOPHIL # BLD: 0 K/UL (ref 0–0.4)
EOSINOPHIL NFR BLD: 0 % (ref 0–7)
EPITH CASTS URNS QL MICRO: ABNORMAL /LPF
ERYTHROCYTE [DISTWIDTH] IN BLOOD BY AUTOMATED COUNT: 14 % (ref 11.5–14.5)
GLOBULIN SER CALC-MCNC: 3.6 G/DL (ref 2–4)
GLUCOSE SERPL-MCNC: 124 MG/DL (ref 65–100)
GLUCOSE UR STRIP.AUTO-MCNC: NEGATIVE MG/DL
HCT VFR BLD AUTO: 40 % (ref 35–47)
HGB BLD-MCNC: 13.2 G/DL (ref 11.5–16)
HGB UR QL STRIP: ABNORMAL
HYALINE CASTS URNS QL MICRO: ABNORMAL /LPF (ref 0–5)
IMM GRANULOCYTES # BLD AUTO: 0.1 K/UL (ref 0–0.04)
IMM GRANULOCYTES NFR BLD AUTO: 1 % (ref 0–0.5)
KETONES UR QL STRIP.AUTO: NEGATIVE MG/DL
LACTATE SERPL-SCNC: 1.5 MMOL/L (ref 0.4–2)
LEUKOCYTE ESTERASE UR QL STRIP.AUTO: ABNORMAL
LYMPHOCYTES # BLD: 1 K/UL (ref 0.8–3.5)
LYMPHOCYTES NFR BLD: 10 % (ref 12–49)
MCH RBC QN AUTO: 34.6 PG (ref 26–34)
MCHC RBC AUTO-ENTMCNC: 33 G/DL (ref 30–36.5)
MCV RBC AUTO: 104.7 FL (ref 80–99)
MONOCYTES # BLD: 1.5 K/UL (ref 0–1)
MONOCYTES NFR BLD: 16 % (ref 5–13)
NEUTS SEG # BLD: 7.1 K/UL (ref 1.8–8)
NEUTS SEG NFR BLD: 73 % (ref 32–75)
NITRITE UR QL STRIP.AUTO: NEGATIVE
NRBC # BLD: 0 K/UL (ref 0–0.01)
NRBC BLD-RTO: 0 PER 100 WBC
PH UR STRIP: 6 [PH] (ref 5–8)
PLATELET # BLD AUTO: 129 K/UL (ref 150–400)
PMV BLD AUTO: 11.9 FL (ref 8.9–12.9)
POTASSIUM SERPL-SCNC: 3.7 MMOL/L (ref 3.5–5.1)
PROT SERPL-MCNC: 7.3 G/DL (ref 6.4–8.2)
PROT UR STRIP-MCNC: 30 MG/DL
RBC # BLD AUTO: 3.82 M/UL (ref 3.8–5.2)
RBC #/AREA URNS HPF: ABNORMAL /HPF (ref 0–5)
SAMPLES BEING HELD,HOLD: NORMAL
SODIUM SERPL-SCNC: 139 MMOL/L (ref 136–145)
SOURCE, COVRS: ABNORMAL
SP GR UR REFRACTOMETRY: 1.01 (ref 1–1.03)
TROPONIN-HIGH SENSITIVITY: 35 NG/L (ref 0–51)
UR CULT HOLD, URHOLD: NORMAL
UROBILINOGEN UR QL STRIP.AUTO: 0.2 EU/DL (ref 0.2–1)
WBC # BLD AUTO: 9.6 K/UL (ref 3.6–11)
WBC URNS QL MICRO: >100 /HPF (ref 0–4)

## 2022-07-03 PROCEDURE — 71045 X-RAY EXAM CHEST 1 VIEW: CPT

## 2022-07-03 PROCEDURE — 87635 SARS-COV-2 COVID-19 AMP PRB: CPT

## 2022-07-03 PROCEDURE — 87086 URINE CULTURE/COLONY COUNT: CPT

## 2022-07-03 PROCEDURE — 65270000029 HC RM PRIVATE

## 2022-07-03 PROCEDURE — 36415 COLL VENOUS BLD VENIPUNCTURE: CPT

## 2022-07-03 PROCEDURE — 96374 THER/PROPH/DIAG INJ IV PUSH: CPT

## 2022-07-03 PROCEDURE — 74011250636 HC RX REV CODE- 250/636: Performed by: EMERGENCY MEDICINE

## 2022-07-03 PROCEDURE — 99285 EMERGENCY DEPT VISIT HI MDM: CPT

## 2022-07-03 PROCEDURE — 85379 FIBRIN DEGRADATION QUANT: CPT

## 2022-07-03 PROCEDURE — 83605 ASSAY OF LACTIC ACID: CPT

## 2022-07-03 PROCEDURE — 85025 COMPLETE CBC W/AUTO DIFF WBC: CPT

## 2022-07-03 PROCEDURE — 81001 URINALYSIS AUTO W/SCOPE: CPT

## 2022-07-03 PROCEDURE — 93005 ELECTROCARDIOGRAM TRACING: CPT

## 2022-07-03 PROCEDURE — 86140 C-REACTIVE PROTEIN: CPT

## 2022-07-03 PROCEDURE — 70450 CT HEAD/BRAIN W/O DYE: CPT

## 2022-07-03 PROCEDURE — 84484 ASSAY OF TROPONIN QUANT: CPT

## 2022-07-03 PROCEDURE — 80053 COMPREHEN METABOLIC PANEL: CPT

## 2022-07-03 PROCEDURE — 74011000250 HC RX REV CODE- 250: Performed by: INTERNAL MEDICINE

## 2022-07-03 RX ORDER — ONDANSETRON 4 MG/1
4 TABLET, ORALLY DISINTEGRATING ORAL
Status: DISCONTINUED | OUTPATIENT
Start: 2022-07-03 | End: 2022-07-04 | Stop reason: HOSPADM

## 2022-07-03 RX ORDER — LEVOFLOXACIN 5 MG/ML
750 INJECTION, SOLUTION INTRAVENOUS
Status: COMPLETED | OUTPATIENT
Start: 2022-07-03 | End: 2022-07-03

## 2022-07-03 RX ORDER — ENOXAPARIN SODIUM 100 MG/ML
40 INJECTION SUBCUTANEOUS EVERY 12 HOURS
Status: DISCONTINUED | OUTPATIENT
Start: 2022-07-03 | End: 2022-07-04

## 2022-07-03 RX ORDER — LEVOFLOXACIN 500 MG/1
500 TABLET, FILM COATED ORAL EVERY 24 HOURS
Status: DISCONTINUED | OUTPATIENT
Start: 2022-07-04 | End: 2022-07-04

## 2022-07-03 RX ORDER — ACETAMINOPHEN 650 MG/1
650 SUPPOSITORY RECTAL
Status: DISCONTINUED | OUTPATIENT
Start: 2022-07-03 | End: 2022-07-04 | Stop reason: HOSPADM

## 2022-07-03 RX ORDER — POLYETHYLENE GLYCOL 3350 17 G/17G
17 POWDER, FOR SOLUTION ORAL DAILY PRN
Status: DISCONTINUED | OUTPATIENT
Start: 2022-07-03 | End: 2022-07-04 | Stop reason: HOSPADM

## 2022-07-03 RX ORDER — ONDANSETRON 2 MG/ML
4 INJECTION INTRAMUSCULAR; INTRAVENOUS
Status: DISCONTINUED | OUTPATIENT
Start: 2022-07-03 | End: 2022-07-04 | Stop reason: HOSPADM

## 2022-07-03 RX ORDER — ENOXAPARIN SODIUM 100 MG/ML
40 INJECTION SUBCUTANEOUS DAILY
Status: DISCONTINUED | OUTPATIENT
Start: 2022-07-04 | End: 2022-07-03

## 2022-07-03 RX ORDER — SODIUM CHLORIDE 0.9 % (FLUSH) 0.9 %
5-40 SYRINGE (ML) INJECTION AS NEEDED
Status: DISCONTINUED | OUTPATIENT
Start: 2022-07-03 | End: 2022-07-04 | Stop reason: HOSPADM

## 2022-07-03 RX ORDER — SODIUM CHLORIDE 0.9 % (FLUSH) 0.9 %
5-40 SYRINGE (ML) INJECTION EVERY 8 HOURS
Status: DISCONTINUED | OUTPATIENT
Start: 2022-07-03 | End: 2022-07-04 | Stop reason: HOSPADM

## 2022-07-03 RX ORDER — ACETAMINOPHEN 325 MG/1
650 TABLET ORAL
Status: DISCONTINUED | OUTPATIENT
Start: 2022-07-03 | End: 2022-07-04 | Stop reason: HOSPADM

## 2022-07-03 RX ADMIN — SODIUM CHLORIDE 500 ML: 9 INJECTION, SOLUTION INTRAVENOUS at 16:09

## 2022-07-03 RX ADMIN — SODIUM CHLORIDE, PRESERVATIVE FREE 10 ML: 5 INJECTION INTRAVENOUS at 23:32

## 2022-07-03 RX ADMIN — LEVOFLOXACIN 750 MG: 5 INJECTION, SOLUTION INTRAVENOUS at 16:09

## 2022-07-03 NOTE — Clinical Note
Status[de-identified] INPATIENT [101]   Type of Bed: Medical [8]   Cardiac Monitoring Required?: No   Inpatient Hospitalization Certified Necessary for the Following Reasons: 3. Patient receiving treatment that can only be provided in an inpatient setting (further clarification in H&P documentation)   Admitting Diagnosis: Altered mental status [780.97. ICD-9-CM]   Admitting Physician: Lanell Goldmann   Attending Physician: Lanell Goldmann   Estimated Length of Stay: 3-4 Midnights   Discharge Plan[de-identified] Home with Office Follow-up

## 2022-07-03 NOTE — ED NOTES
TRANSFER - OUT REPORT:    Verbal report given to Jeremiah Jackson RN(name) on Lyla Card  being transferred to [facility](unit) for routine progression of care       Report consisted of patients Situation, Background, Assessment and   Recommendations(SBAR). Information from the following report(s) SBAR, ED Summary and OR Summary was reviewed with the receiving nurse. Lines:   Peripheral IV 07/03/22 Right Antecubital (Active)   Site Assessment Clean, dry, & intact 07/03/22 1354   Phlebitis Assessment 0 07/03/22 1354   Infiltration Assessment 0 07/03/22 1354   Dressing Status Clean, dry, & intact 07/03/22 1354   Dressing Type 4 X 4 07/03/22 1354        Opportunity for questions and clarification was provided.       Patient transported with:   [employer]

## 2022-07-03 NOTE — ED PROVIDER NOTES
Date of Service:  7/3/2022    Patient:  Dennis Barboza    Chief Complaint:  Altered mental status and Fever       HPI:  Dennis Barboza is a 81 y.o.  female who presents for evaluation of confusion. Patient arrives awake alert confused from her residence where it is noted that she had an episode of facial droop and dropped her coffee that immediately resolved. According to EMS last known well was yesterday, unknown what time. Patient unable to provide much history. Patient denies any pain or problem but she is aware that her son thought she may have had a stroke and that is why she is at the hospital.  Otherwise, son is not at bedside, on the way to the hospital.  No other information readily available. There is reported history of recent TIA symptoms           Past Medical History:   Diagnosis Date    Breast CA ([address]     Fluid retention in legs     Hypertension     Hypothyroidism        Past Surgical History:   Procedure Laterality Date    HX ORTHOPAEDIC  2007    left hip replacement    NM BREAST SURGERY PROCEDURE UNLISTED      Left lumpectomy         History reviewed. No pertinent family history. Social History     Socioeconomic History    Marital status:      Spouse name: Not on file    Number of children: Not on file    Years of education: Not on file    Highest education level: Not on file   Occupational History    Not on file   Tobacco Use    Smoking status: Current Every Day Smoker    Smokeless tobacco: Never Used   Substance and Sexual Activity    Alcohol use:  Yes     Alcohol/week: 0.0 standard drinks    Drug use: No    Sexual activity: Never   Other Topics Concern    Not on file   Social History Narrative    Not on file     Social Determinants of Health     Financial Resource Strain:     Difficulty of Paying Living Expenses: Not on file   Food Insecurity:     Worried About Running Out of Food in the Last Year: Not on file    Netta of Food in the Last Year: Not on file Transportation Needs:     Lack of Transportation (Medical): Not on file    Lack of Transportation (Non-Medical): Not on file   Physical Activity:     Days of Exercise per Week: Not on file    Minutes of Exercise per Session: Not on file   Stress:     Feeling of Stress : Not on file   Social Connections:     Frequency of Communication with Friends and Family: Not on file    Frequency of Social Gatherings with Friends and Family: Not on file    Attends Congregation Services: Not on file    Active Member of [address] [place] [id number] or Organizations: Not on file    Attends Club or Organization Meetings: Not on file    Marital Status: Not on file   Intimate Partner Violence:     Fear of Current or Ex-Partner: Not on file    Emotionally Abused: Not on file    Physically Abused: Not on file    Sexually Abused: Not on file   Housing Stability:     Unable to Pay for Housing in the Last Year: Not on file    Number of [place] in the Last Year: Not on file    Unstable Housing in the Last Year: Not on file         ALLERGIES: Sulfamethoxazole-trimethoprim, Statins-hmg-coa reductase inhibitors, Darvocet a500 [propoxyphene n-acetaminophen], Percocet [oxycodone-acetaminophen], and Rocephin [ceftriaxone]    Review of Systems   Unable to perform ROS: Mental status change       Vitals:    07/03/22 1351   BP: (!) 158/72   Pulse: 77   Resp: 22   Temp: 100 °F (37.8 °C)   SpO2: 94%            Physical Exam  Vitals and nursing note reviewed. Constitutional:       Appearance: She is well-developed. HENT:      Head: Normocephalic and atraumatic. Mouth/Throat:      Mouth: Mucous membranes are moist.      Pharynx: Oropharynx is clear. Cardiovascular:      Rate and Rhythm: Normal rate and regular rhythm. Pulmonary:      Effort: Pulmonary effort is normal.      Breath sounds: Normal breath sounds. Abdominal:      Palpations: Abdomen is soft. Musculoskeletal:         General: Normal range of motion.    Skin:     General: Skin is warm.      Capillary Refill: Capillary refill takes less than 2 seconds. Neurological:      Mental Status: She is alert. She is confused. Cranial Nerves: No cranial nerve deficit, dysarthria or facial asymmetry. Motor: No weakness. Psychiatric:         Mood and Affect: Mood normal.          [facility]  ED Course as of 07/03/22 1555   Sun Jul 03, 2022   1403 EKG 1354  Normal sinus rhythm at 71 bpm left axis deviation left bundle branch block. No STEMI [GG]   1440 WBC(!): >100 [GG]   1440 Leukocyte Esterase(!): MODERATE [GG]      ED Course User Index  [GG] Kirkpatrick DO Rupesh     VITAL SIGNS:  Patient Vitals for the past 4 hrs:   Temp Pulse Resp BP SpO2   07/03/22 1351 100 °F (37.8 °C) 77 22 (!) 158/72 94 %         LABS:  Recent Results (from the past 6 hour(s))   SAMPLES BEING HELD    Collection Time: 07/03/22  1:49 PM   Result Value Ref Range    SAMPLES BEING HELD 1RED 1BLU 1BC (SILV)     COMMENT        Add-on orders for these samples will be processed based on acceptable specimen integrity and analyte stability, which may vary by analyte. CBC WITH AUTOMATED DIFF    Collection Time: 07/03/22  1:49 PM   Result Value Ref Range    WBC 9.6 3.6 - 11.0 K/uL    RBC 3.82 3.80 - 5.20 M/uL    HGB 13.2 11.5 - 16.0 g/dL    HCT 40.0 35.0 - 47.0 %    .7 (H) 80.0 - 99.0 FL    MCH 34.6 (H) 26.0 - 34.0 PG    MCHC 33.0 30.0 - 36.5 g/dL    RDW 14.0 11.5 - 14.5 %    PLATELET 465 (L) 603 - 400 K/uL    MPV 11.9 8.9 - 12.9 FL    NRBC 0.0 0  WBC    ABSOLUTE NRBC 0.00 0.00 - 0.01 K/uL    NEUTROPHILS 73 32 - 75 %    LYMPHOCYTES 10 (L) 12 - 49 %    MONOCYTES 16 (H) 5 - 13 %    EOSINOPHILS 0 0 - 7 %    BASOPHILS 0 0 - 1 %    IMMATURE GRANULOCYTES 1 (H) 0.0 - 0.5 %    ABS. NEUTROPHILS 7.1 1.8 - 8.0 K/UL    ABS. LYMPHOCYTES 1.0 0.8 - 3.5 K/UL    ABS. MONOCYTES 1.5 (H) 0.0 - 1.0 K/UL    ABS. EOSINOPHILS 0.0 0.0 - 0.4 K/UL    ABS. BASOPHILS 0.0 0.0 - 0.1 K/UL    ABS. IMM.  GRANS. 0.1 (H) 0.00 - 0.04 K/UL    DF AUTOMATED METABOLIC PANEL, COMPREHENSIVE    Collection Time: 07/03/22  1:49 PM   Result Value Ref Range    Sodium 139 136 - 145 mmol/L    Potassium 3.7 3.5 - 5.1 mmol/L    Chloride 106 97 - 108 mmol/L    CO2 31 21 - 32 mmol/L    Anion gap 2 (L) 5 - 15 mmol/L    Glucose 124 (H) 65 - 100 mg/dL    BUN 28 (H) 6 - 20 MG/DL    Creatinine 1.33 (H) 0.55 - 1.02 MG/DL    BUN/Creatinine ratio 21 (H) 12 - 20      GFR est AA 46 (L) >60 ml/min/1.73m2    GFR est non-AA 38 (L) >60 ml/min/1.73m2    Calcium 9.3 8.5 - 10.1 MG/DL    Bilirubin, total 0.4 0.2 - 1.0 MG/DL    ALT (SGPT) 24 12 - 78 U/L    AST (SGOT) 32 15 - 37 U/L    Alk.  phosphatase 74 45 - 117 U/L    Protein, total 7.3 6.4 - 8.2 g/dL    Albumin 3.7 3.5 - 5.0 g/dL    Globulin 3.6 2.0 - 4.0 g/dL    A-G Ratio 1.0 (L) 1.1 - 2.2     TROPONIN-HIGH SENSITIVITY    Collection Time: 07/03/22  1:49 PM   Result Value Ref Range    Troponin-High Sensitivity 35 0 - 51 ng/L   EKG, 12 LEAD, INITIAL    Collection Time: 07/03/22  1:54 PM   Result Value Ref Range    Ventricular Rate 71 BPM    Atrial Rate 71 BPM    P-R Interval 120 ms    QRS Duration 160 ms    Q-T Interval 484 ms    QTC Calculation (Bezet) 525 ms    Calculated P Axis 16 degrees    Calculated R Axis -39 degrees    Calculated T Axis 138 degrees    Diagnosis       Normal sinus rhythm  Left axis deviation  Left bundle branch block  Abnormal ECG  When compared with ECG of 18-JAN-2020 10:47,  T wave inversion now evident in Lateral leads     LACTIC ACID    Collection Time: 07/03/22  1:59 PM   Result Value Ref Range    Lactic acid 1.5 0.4 - 2.0 MMOL/L   URINALYSIS W/MICROSCOPIC    Collection Time: 07/03/22  2:11 PM   Result Value Ref Range    Color YELLOW/STRAW      Appearance CLOUDY (A) CLEAR      Specific gravity 1.013 1.003 - 1.030      pH (UA) 6.0 5.0 - 8.0      Protein 30 (A) NEG mg/dL    Glucose Negative NEG mg/dL    Ketone Negative NEG mg/dL    Bilirubin Negative NEG      Blood TRACE (A) NEG      Urobilinogen 0.2 0.2 - 1.0 EU/dL Nitrites Negative NEG      Leukocyte Esterase MODERATE (A) NEG      WBC >100 (H) 0 - 4 /hpf    RBC 0-5 0 - 5 /hpf    Epithelial cells FEW FEW /lpf    Bacteria Negative NEG /hpf    Hyaline cast 0-2 0 - 5 /lpf   URINE CULTURE HOLD SAMPLE    Collection Time: 07/03/22  2:11 PM    Specimen: Serum; Urine   Result Value Ref Range    Urine culture hold        Urine on hold in Microbiology dept for 2 days. If unpreserved urine is submitted, it cannot be used for addtional testing after 24 hours, recollection will be required. IMAGING:  CT HEAD WO CONT   Final Result   No evidence of acute process. XR CHEST PORT   Final Result   No acute cardiopulmonary process. Medications During Visit:  Medications   sodium chloride 0.9 % bolus infusion 500 mL (has no administration in time range)   levoFLOXacin (LEVAQUIN) 750 mg in D5W IVPB (has no administration in time range)         DECISION MAKING:  Darling Rodgers is a 81 y.o. female who comes in as above. Patient x-ray imaging and fever, antibiotics started. Later found to have the patient to have COVID. This most likely the cause of all of her symptoms. Will admit for further management. Patient and family agree to plan      IMPRESSION:  1. Confusion    2. Pyuria    3. Acute febrile illness        DISPOSITION:  Admitted    Perfect Serve Consult for Admission  3:55 PM    ED Room Number: IQ73/22  Patient Name and age:  Darling Rodgers 81 y.o.  female  Working Diagnosis:   1. Confusion    2. Pyuria    3. Acute febrile illness        COVID-19 Suspicion:  no  Sepsis present:  no  Reassessment needed: no  Code Status:  Full Code  Readmission: no  Isolation Requirements:  no  Recommended Level of Care:  telemetry  Department:[facility] Adult ED - 21   Other:  Several days of being off. Some confusion, fever today. Similar to previous UTIs. Awake and alert, confused. Son at bedside has long story about how she has been zoning out the last several days. History of CVA as well, they were concerned for stroke. No overt symptoms of CVA here, CT okay.        Procedures

## 2022-07-03 NOTE — ED NOTES
Has a final transfer review been performed? Yes    Reason for Admission: AMS  Patient comes from: Home  Mental Status: Confused  ADL:total assistance  Ambulation: no ambulation at this time due to difficulty following commands. Pertinent Info/Safety Concerns: Covid+  COVID Status: Positive  MEWS Score: 1  Vitals:    07/03/22 1351 07/03/22 1614 07/03/22 1723   BP: (!) 158/72  (!) 157/74   Pulse: 77  67   Resp: 22  16   Temp: 100 °F (37.8 °C)  98.9 °F (37.2 °C)   SpO2: 94%     Height:  5' 4.96\" (1.65 m)      Transport mode:ED stretcher    TRANSFER - OUT REPORT:    Jeff Garcia  being transferred to [facility](unit) for routine progression of care     \"Notification of etransfer note given to (Name) Nathan Mcnulty (Title) RN    Report consisted of patient's Situation, Background, Assessment and   Recommendations(SBAR). Lines:   Peripheral IV 07/03/22 Right Antecubital (Active)   Site Assessment Clean, dry, & intact 07/03/22 1354   Phlebitis Assessment 0 07/03/22 1354   Infiltration Assessment 0 07/03/22 1354   Dressing Status Clean, dry, & intact 07/03/22 1354   Dressing Type 4 X 4 07/03/22 1354        Opportunity for questions and clarification was provided.

## 2022-07-03 NOTE — H&P
[address] [facility] Adult  Hospitalist Group  History and Physical    Date of Service:  7/3/2022  Primary Care Provider: Elizabeth Platt MD  Source of information: The patient and Spouse/family member    Chief Complaint: Altered mental status and Fever      History of Presenting Illness:   Roopa Heck is a 81 y.o. female with pmh of HTN, Hypothyroidism, and prior CVA, dementia who presents with confusion, and fever. Pt is a poor historian, currently denies any specific complaints. Her son is present and provides most of the history. Pt had been in her usual state of health until Friday when she started having periods of staring off, and acting more confused than usual. In addition she has been much weaker and having difficulty ambulating. She typically is able to get around using a cane. Today she had an episode where her  (who also has dementia) noted she was starting off and not responding. This prompted the family to call 911. They deny any specific areas of weakness, numbness, aphasia. EMS thought she maybe had an asymmetric smile, however was not the case here. Pt denies any frequency, urgency, or dysuria. They deny any fevers, chills, runny nose, cough, abdominal pain, N/V/D/C, no MSK pain. In the ER pt noted to have low grade temp to 100, and UA with sterile pyuria. Rapid covid test was positive. She was then referred to admission to the hospital.   Patient is vaccinated x 2, no booster. REVIEW OF SYSTEMS:  A comprehensive review of systems was negative except for that written in the History of Present Illness. Past Medical History:   Diagnosis Date    Breast CA ([address]     Fluid retention in legs     Hypertension     Hypothyroidism       Past Surgical History:   Procedure Laterality Date    HX ORTHOPAEDIC  2007    left hip replacement    [place] BREAST SURGERY PROCEDURE UNLISTED      Left lumpectomy     Prior to Admission medications    Medication Sig Start Date End Date Taking? Authorizing Provider   diclofenac (VOLTAREN) 1 % gel Apply 2 g to affected area daily. 1/12/21   Provider, Historical   haloperidoL (HALDOL) 0.5 mg tablet Take 0.5 mg by mouth every other day. 12/6/20   Provider, Historical   aspirin delayed-release 81 mg tablet Take 81 mg by mouth daily. 1/22/20   Provider, Historical   acetaminophen (TYLENOL) 500 mg tablet Take 500 mg by mouth every six (6) hours as needed for Pain. 1/22/20   Provider, Historical   aspirin (ASPIRIN) 325 mg tablet Take 1 Tab by mouth daily. Patient not taking: Reported on 2/1/2020 1/23/20   Fernando Gamino MD   atorvastatin (LIPITOR) 40 mg tablet Take 1 Tab by mouth nightly. 1/22/20   Fernando Gamino MD   melatonin 3 mg tablet Take 1 Tab by mouth nightly as needed (sleep). Patient not taking: Reported on 1/6/2021 1/22/20   Fernando Gamino MD   levoFLOXacin (LEVAQUIN) 250 mg tablet Take 1 Tab by mouth daily. Patient not taking: Reported on 2/1/2020 1/22/20   Fernando Gamino MD   donepeziL (ARICEPT) 5 mg tablet Take 5 mg by mouth nightly. Provider, Historical   memantine ER (NAMENDA XR) 28 mg capsule Take 28 mg by mouth daily. Provider, Historical   cranberry 500 mg capsule Take 500 mg by mouth daily. 7/31/18   Provider, Historical   multivit-min/iron/folic/lutein (CENTRUM SILVER WOMEN PO) Take 1,500 mg by mouth daily. Sania Liu MD   escitalopram oxalate (LEXAPRO) 20 mg tablet Take 20 mg by mouth daily. Provider, Historical   levothyroxine (SYNTHROID) 100 mcg tablet Take 75 mcg by mouth six (6) days a week. Take 75 mcg by mouth Monday through Saturday.  Take half-tablet (50 mcg) by mouth on Sunday 2/12/20   Provider, Historical     Allergies   Allergen Reactions    Sulfamethoxazole-Trimethoprim Hives     Treated with diphenhydramine    Statins-Hmg-Coa Reductase Inhibitors Unknown (comments)     Leg cramps/weakness    Darvocet A500 [Propoxyphene N-Acetaminophen] Itching    Percocet [Oxycodone-Acetaminophen] Itching    Rocephin [Ceftriaxone] Swelling     Questionable allergy, had facial swelling morning after IV administration      FH: denies h/o CVA or heart attack     Social History:  reports that she has been smoking. She has never used smokeless tobacco. She reports current alcohol use. She reports that she does not use drugs. Family and social history were personally reviewed, all pertinent and relevant details are outlined as above. Objective:     Visit Vitals  BP (!) 158/72 (BP 1 Location: Left upper arm, BP Patient Position: At rest;Lying)   Pulse 77   Temp 100 °F (37.8 °C)   Resp 22   Ht 5' 4.96\" (1.65 m) Comment: Previous encounter   SpO2 94%   BMI 19.99 kg/m²      O2 Device: None (Room air)    PHYSICAL EXAM:   General: Alert x oriented x 1 (self), awake, no acute distress, resting in bed, pleasant female, appears to be stated age  HEENT: PEERL, EOMI, moist mucus membranes  Chest: Clear to auscultation bilaterally, no increased work of breathing, speaking in full sentences    CVS: RRR, S1 S2 heard, no murmurs/rubs/gallops  Abd: Soft, non-tender, non-distended, +bowel sounds   Ext: No clubbing, no cyanosis, no edema  Neuro/Psych: Pleasant mood and affect, CN 2-12 grossly intact, sensory grossly within normal limit, Strength 5/5 in all extremities  Cap refill: Brisk, less than 3 seconds  Pulses: 2+, symmetric in all extremities  Skin: Warm, dry, without rashes or lesions    Data Review: All diagnostic labs and studies have been reviewed.     Abnormal Labs Reviewed   URINALYSIS W/MICROSCOPIC - Abnormal; Notable for the following components:       Result Value    Appearance CLOUDY (*)     Protein 30 (*)     Blood TRACE (*)     Leukocyte Esterase MODERATE (*)     WBC >100 (*)     All other components within normal limits   CBC WITH AUTOMATED DIFF - Abnormal; Notable for the following components:    .7 (*)     MCH 34.6 (*)     PLATELET 129 (*)     LYMPHOCYTES 10 (*)     MONOCYTES 16 (*)     IMMATURE GRANULOCYTES 1 (*) ABS. MONOCYTES 1.5 (*)     ABS. IMM. GRANS. 0.1 (*)     All other components within normal limits   METABOLIC PANEL, COMPREHENSIVE - Abnormal; Notable for the following components:    Anion gap 2 (*)     Glucose 124 (*)     BUN 28 (*)     Creatinine 1.33 (*)     BUN/Creatinine ratio 21 (*)     GFR est AA 46 (*)     GFR est non-AA 38 (*)     A-G Ratio 1.0 (*)     All other components within normal limits       All Micro Results     Procedure Component Value Units Date/Time    CULTURE, URINE [[id number]]     Order Status: Sent Specimen: Urine from Clean catch     COVID-19 RAPID TEST [[id number]] Collected: 07/03/22 1602    Order Status: Completed Updated: 07/03/22 1623    URINE CULTURE HOLD SAMPLE [[id number]] Collected: 07/03/22 1411    Order Status: Completed Specimen: Urine from Serum Updated: 07/03/22 1427     Urine culture hold       Urine on hold in Microbiology dept for 2 days. If unpreserved urine is submitted, it cannot be used for addtional testing after 24 hours, recollection will be required. IMAGING:   CT HEAD WO CONT   Final Result   No evidence of acute process. XR CHEST PORT   Final Result   No acute cardiopulmonary process.               ECG/ECHO:    Results for orders placed or performed during the hospital encounter of 07/03/22   EKG, 12 LEAD, INITIAL   Result Value Ref Range    Ventricular Rate 71 BPM    Atrial Rate 71 BPM    P-R Interval 120 ms    QRS Duration 160 ms    Q-T Interval 484 ms    QTC Calculation (Bezet) 525 ms    Calculated P Axis 16 degrees    Calculated R Axis -39 degrees    Calculated T Axis 138 degrees    Diagnosis       Normal sinus rhythm  Left axis deviation  Left bundle branch block  Abnormal ECG  When compared with ECG of 18-JAN-2020 10:47,  T wave inversion now evident in Lateral leads          Assessment:   Given the patient's current clinical presentation, there is a high level of concern for decompensation if discharged from the emergency department. Complex decision making was performed, which includes reviewing the patient's available past medical records, laboratory results, and imaging studies. Active Problems:    Altered mental status (7/3/2022)      Plan:   [id number]   Metabolic encephalopathy  - Check inflammatory markers   - CT head negative, monitor for CVA symptoms. No suspicion for CVA at this time   - Monitor for O2 requirement   - Counseled family to expect confusion due to COVID infection  - Frequent re-orientation   - Avoid benzo and narcotic if able   - PT/OT for mobility assessment   - If no O2 requirement, then would expect pt to be able to DC tomorrow    SHERINE stage 1  (Cr up to 1.3 from baseline 0.9)- secondary to COVID  - Will hold off on IVF given COVID  - Avoid nephrotoxins   - s/p bolus in ER  - Promote PO hydration   - Repeat BMP in am     Possible UTI - sterile pyuria  - Send for culture  - empiric levofloxacin, suspect symptoms are from COVID     H/o CVA  - Continue ASA, statin     H/o Dementia - home namenda, haldol, aricept   Depression - lexapro     DIET: ADULT DIET Regular   ISOLATION PRECAUTIONS: There are currently no Active Isolations  CODE STATUS: DNR   DVT PROPHYLAXIS: Lovenox  FUNCTIONAL STATUS PRIOR TO HOSPITALIZATION: Ambulatory and capable of self-care but relies on assistive devices (rolling walker/cane). EARLY MOBILITY ASSESSMENT: Recommend an assessment from physical therapy and/or occupational therapy  ANTICIPATED DISCHARGE: less than 24 hours. EMERGENCY CONTACT/SURROGATE DECISION MAKER: All children     CRITICAL CARE WAS PERFORMED FOR THIS ENCOUNTER: NO.      Signed By: Claudetta Grange, MD     July 3, 2022         Please note that this dictation may have been completed with Dragon, the [employer] voice recognition software. Quite often unanticipated grammatical, syntax, homophones, and other interpretive errors are inadvertently transcribed by the computer software. Please disregard these errors. Please excuse any errors that have escaped final proofreading.

## 2022-07-03 NOTE — ED TRIAGE NOTES
Pt arrives to ED via EMS from home with c/o AMS and fever. EMS reports right sided deficit after previous neurologic problem.

## 2022-07-04 ENCOUNTER — APPOINTMENT (OUTPATIENT)
Dept: CT IMAGING | Age: 87
DRG: 177 | End: 2022-07-04
Attending: INTERNAL MEDICINE
Payer: MEDICARE

## 2022-07-04 ENCOUNTER — HOME HEALTH ADMISSION (OUTPATIENT)
Dept: HOME HEALTH SERVICES | Facility: HOME HEALTH | Age: 87
End: 2022-07-04
Payer: MEDICARE

## 2022-07-04 VITALS
WEIGHT: 120.2 LBS | DIASTOLIC BLOOD PRESSURE: 64 MMHG | OXYGEN SATURATION: 94 % | SYSTOLIC BLOOD PRESSURE: 96 MMHG | BODY MASS INDEX: 20.03 KG/M2 | RESPIRATION RATE: 15 BRPM | HEIGHT: 65 IN | TEMPERATURE: 98 F | HEART RATE: 73 BPM

## 2022-07-04 LAB
ANION GAP SERPL CALC-SCNC: 5 MMOL/L (ref 5–15)
BACTERIA SPEC CULT: NORMAL
BASOPHILS # BLD: 0 K/UL (ref 0–0.1)
BASOPHILS NFR BLD: 0 % (ref 0–1)
BUN SERPL-MCNC: 25 MG/DL (ref 6–20)
BUN/CREAT SERPL: 22 (ref 12–20)
CALCIUM SERPL-MCNC: 8.2 MG/DL (ref 8.5–10.1)
CC UR VC: NORMAL
CHLORIDE SERPL-SCNC: 108 MMOL/L (ref 97–108)
CO2 SERPL-SCNC: 27 MMOL/L (ref 21–32)
CREAT SERPL-MCNC: 1.13 MG/DL (ref 0.55–1.02)
DIFFERENTIAL METHOD BLD: ABNORMAL
EOSINOPHIL # BLD: 0 K/UL (ref 0–0.4)
EOSINOPHIL NFR BLD: 0 % (ref 0–7)
ERYTHROCYTE [DISTWIDTH] IN BLOOD BY AUTOMATED COUNT: 13.6 % (ref 11.5–14.5)
GLUCOSE SERPL-MCNC: 82 MG/DL (ref 65–100)
HCT VFR BLD AUTO: 35.5 % (ref 35–47)
HGB BLD-MCNC: 11.8 G/DL (ref 11.5–16)
IMM GRANULOCYTES # BLD AUTO: 0 K/UL (ref 0–0.04)
IMM GRANULOCYTES NFR BLD AUTO: 0 % (ref 0–0.5)
LYMPHOCYTES # BLD: 1.1 K/UL (ref 0.8–3.5)
LYMPHOCYTES NFR BLD: 23 % (ref 12–49)
MAGNESIUM SERPL-MCNC: 2.1 MG/DL (ref 1.6–2.4)
MCH RBC QN AUTO: 34.2 PG (ref 26–34)
MCHC RBC AUTO-ENTMCNC: 33.2 G/DL (ref 30–36.5)
MCV RBC AUTO: 102.9 FL (ref 80–99)
MONOCYTES # BLD: 1 K/UL (ref 0–1)
MONOCYTES NFR BLD: 21 % (ref 5–13)
NEUTS SEG # BLD: 2.5 K/UL (ref 1.8–8)
NEUTS SEG NFR BLD: 56 % (ref 32–75)
NRBC # BLD: 0 K/UL (ref 0–0.01)
NRBC BLD-RTO: 0 PER 100 WBC
PHOSPHATE SERPL-MCNC: 2.8 MG/DL (ref 2.6–4.7)
PLATELET # BLD AUTO: 104 K/UL (ref 150–400)
PMV BLD AUTO: 12.4 FL (ref 8.9–12.9)
POTASSIUM SERPL-SCNC: 3.2 MMOL/L (ref 3.5–5.1)
RBC # BLD AUTO: 3.45 M/UL (ref 3.8–5.2)
RBC MORPH BLD: ABNORMAL
SERVICE CMNT-IMP: NORMAL
SODIUM SERPL-SCNC: 140 MMOL/L (ref 136–145)
WBC # BLD AUTO: 4.6 K/UL (ref 3.6–11)

## 2022-07-04 PROCEDURE — 97165 OT EVAL LOW COMPLEX 30 MIN: CPT

## 2022-07-04 PROCEDURE — 83735 ASSAY OF MAGNESIUM: CPT

## 2022-07-04 PROCEDURE — 97162 PT EVAL MOD COMPLEX 30 MIN: CPT

## 2022-07-04 PROCEDURE — 85025 COMPLETE CBC W/AUTO DIFF WBC: CPT

## 2022-07-04 PROCEDURE — 97116 GAIT TRAINING THERAPY: CPT

## 2022-07-04 PROCEDURE — 80048 BASIC METABOLIC PNL TOTAL CA: CPT

## 2022-07-04 PROCEDURE — 74011250637 HC RX REV CODE- 250/637: Performed by: INTERNAL MEDICINE

## 2022-07-04 PROCEDURE — 71275 CT ANGIOGRAPHY CHEST: CPT

## 2022-07-04 PROCEDURE — 97535 SELF CARE MNGMENT TRAINING: CPT

## 2022-07-04 PROCEDURE — 97530 THERAPEUTIC ACTIVITIES: CPT

## 2022-07-04 PROCEDURE — 84100 ASSAY OF PHOSPHORUS: CPT

## 2022-07-04 PROCEDURE — 36415 COLL VENOUS BLD VENIPUNCTURE: CPT

## 2022-07-04 PROCEDURE — 74011000636 HC RX REV CODE- 636: Performed by: RADIOLOGY

## 2022-07-04 RX ORDER — ESCITALOPRAM OXALATE 10 MG/1
20 TABLET ORAL DAILY
Status: DISCONTINUED | OUTPATIENT
Start: 2022-07-04 | End: 2022-07-04 | Stop reason: HOSPADM

## 2022-07-04 RX ORDER — NIRMATRELVIR AND RITONAVIR 300-100 MG
3 KIT ORAL EVERY 12 HOURS
Qty: 1 BOX | Refills: 0 | Status: SHIPPED | OUTPATIENT
Start: 2022-07-04 | End: 2022-07-09

## 2022-07-04 RX ORDER — ASPIRIN 81 MG/1
81 TABLET ORAL DAILY
Status: DISCONTINUED | OUTPATIENT
Start: 2022-07-04 | End: 2022-07-04 | Stop reason: HOSPADM

## 2022-07-04 RX ORDER — ENOXAPARIN SODIUM 100 MG/ML
30 INJECTION SUBCUTANEOUS EVERY 12 HOURS
Status: DISCONTINUED | OUTPATIENT
Start: 2022-07-04 | End: 2022-07-04 | Stop reason: HOSPADM

## 2022-07-04 RX ORDER — DONEPEZIL HYDROCHLORIDE 5 MG/1
5 TABLET, FILM COATED ORAL
Status: DISCONTINUED | OUTPATIENT
Start: 2022-07-04 | End: 2022-07-04 | Stop reason: HOSPADM

## 2022-07-04 RX ORDER — ATORVASTATIN CALCIUM 40 MG/1
40 TABLET, FILM COATED ORAL
Status: DISCONTINUED | OUTPATIENT
Start: 2022-07-04 | End: 2022-07-04 | Stop reason: HOSPADM

## 2022-07-04 RX ORDER — LEVOTHYROXINE SODIUM 75 UG/1
75 TABLET ORAL
Status: DISCONTINUED | OUTPATIENT
Start: 2022-07-04 | End: 2022-07-04 | Stop reason: HOSPADM

## 2022-07-04 RX ORDER — HALOPERIDOL 0.5 MG/1
0.5 TABLET ORAL
Status: DISCONTINUED | OUTPATIENT
Start: 2022-07-04 | End: 2022-07-04 | Stop reason: HOSPADM

## 2022-07-04 RX ORDER — LEVOFLOXACIN 250 MG/1
250 TABLET ORAL EVERY 24 HOURS
Status: DISCONTINUED | OUTPATIENT
Start: 2022-07-04 | End: 2022-07-04 | Stop reason: HOSPADM

## 2022-07-04 RX ADMIN — IOPAMIDOL 80 ML: 755 INJECTION, SOLUTION INTRAVENOUS at 12:00

## 2022-07-04 RX ADMIN — ESCITALOPRAM OXALATE 20 MG: 10 TABLET ORAL at 09:42

## 2022-07-04 RX ADMIN — ASPIRIN 81 MG: 81 TABLET, COATED ORAL at 09:42

## 2022-07-04 NOTE — PROGRESS NOTES
Transition Of Care: Update 1:32pm: [facility] home health can start services on 7/8. MD agrees with the plan. Mitchell Guzman contact info placed on discharge avs.     11:40am: Home health pending and due to holiday, offices are closed. CM sent referral to [facility]. PT/OT eval pending. If the patient discharges today. CM will follow up with home health and the patient's family. CM spoke with the patient's son, Valerie Kennedy via telephone. The patient is confused. The patient and family plan for the patient to discharge home with home health and family to transport when stable for discharge. RUR: 12%    Medicare pt has received, reviewed, and signed  IM letter informing them of their right to appeal the discharge. Signed copied has been placed on pt bedside chart. Care Management Interventions  PCP Verified by CM: Yes  Palliative Care Criteria Met (RRAT>21 & CHF Dx)?: No  Mode of Transport at Discharge: Other (see comment)  Transition of Care Consult (CM Consult): [address]: Yes  MyChart Signup: No  Discharge Durable Medical Equipment: No  Health Maintenance Reviewed: Yes  Physical Therapy Consult: Yes  Occupational Therapy Consult: Yes  Speech Therapy Consult: No  Support Systems: Spouse/Significant Other  Confirm Follow Up Transport: Family  The Plan for Transition of Care is Related to the Following Treatment Goals : The patient plans to discharge home.   The Patient and/or Patient Representative was Provided with a Choice of Provider and Agrees with the Discharge Plan?: Yes  Freedom of Choice List was Provided with Basic Dialogue that Supports the Patient's Individualized Plan of Care/Goals, Treatment Preferences and Shares the Quality Data Associated with the Providers?: Yes   Resource Information Provided?: No  Discharge Location  Patient Expects to be Discharged to[de-identified] Home with home health     Reason for Admission:  Altered Mental Status                     RUR Score:  12% Plan for utilizing home health: The Plan for Transition of Care is related to the following treatment goals: Home Health    The Patient and/or patient representative Alejandra Darnell was provided with a choice of provider and agrees   with the discharge plan. [x] Yes [] No    Freedom of choice list was provided with basic dialogue that supports the patient's individualized plan of care/goals, treatment preferences and shares the quality data associated with the providers. [x] Yes [] No        PCP: First and Last name:  Ulisses Sutherland MD     Name of Practice:    Are you a current patient: Yes/No: Y   Approximate date of last visit: May, 2022   Can you participate in a virtual visit with your PCP: Y                    Current Advanced Directive/Advance Care Plan: DNR      Healthcare Decision Maker:   Click here to complete [address] including selection of the Healthcare Decision Maker Relationship (ie \"Primary\")           Son: Elizabeth Hua, [phone]                    Transition of Care Plan:              CM spoke with the patient's son, Aries Noel on the phone. The patient is confused. The patient lives with her  (per sonAries. His dad recently had a stroke). The patient and her  have family and hired care givers that come daily to help with adl's. The patient was using a cane to assist with mobility and they use [facility] pharmacy on [address]. The patient plans to discharge home with home health and family to transport when stable for discharge. The son, Aries Noel requests [facility] home health. Referral pending. Son, Aries Noel, states that his is currently on vacation and his brother Darrin Olivarez ([phone]) would be assisting with transport. CM following for discharge needs.     Zeeshan Parada RN/CRM

## 2022-07-04 NOTE — DISCHARGE SUMMARY
Discharge Summary       PATIENT ID: Jessica Rosa  MRN: [id number]   YOB: 1935    DATE OF ADMISSION: 7/3/2022  1:39 PM    DATE OF DISCHARGE: 07/04/22     PRIMARY CARE PROVIDER: Sole Mejia MD     DISCHARGING PROVIDER: Darell Shah MD    To contact this individual call [phone] and ask the  to page. If unavailable ask to be transferred the Adult Hospitalist Department. CONSULTATIONS: None    PROCEDURES/SURGERIES: * No surgery found *    DISCHARGE DIAGNOSES:   COVID 19  Metabolic encephalopathy   SHERINE - resolved        [address] [facility] COURSE:   Jessica Rosa is a 81 y.o. female with pmh of HTN, Hypothyroidism, and prior CVA, dementia who presents with confusion, and fever. Pt is a poor historian, currently denies any specific complaints. Her son is present and provides most of the history. Pt had been in her usual state of health until Friday when she started having periods of staring off, and acting more confused than usual. In addition she has been much weaker and having difficulty ambulating. She typically is able to get around using a cane. Today she had an episode where her  (who also has dementia) noted she was starting off and not responding. This prompted the family to call 911. They deny any specific areas of weakness, numbness, aphasia. EMS thought she maybe had an asymmetric smile, however was not the case here. Pt denies any frequency, urgency, or dysuria. They deny any fevers, chills, runny nose, cough, abdominal pain, N/V/D/C, no MSK pain. Pt admitted and diagnosed with COVID19. She then started developing a cough and some sore throat, likely all related to COVID. Afebrile. No O2 requirement, and breathing comfortably on RA. D dimer slightly elevated, and CTA negative.  Will attempt to get LE doppler as well, however no indication for DVT on exam, no calf pain, swelling, and therefore will not keep her inpatient for this if unable to obtain. Instructed to continue to closely monitor her breathing, as most patients get PNA complications on day 9-96 of illness. Given no O2 requirement, will go ahead and prescribe Paxlovid outpatient to reduce risk of rehospitalization. DISCHARGE DIAGNOSES / PLAN:      [id number]   Metabolic encephalopathy  - CRP low which is reassuring.   - D dimer elevated, will check CTA. LE doppler if able. No swelling on exam to suggest DVT, and US not in house. Will defer given low D Dimer in the setting of COVID. Instructed family to watch closely for LE swelling, redness, or pain. - CT head negative, monitor for CVA symptoms. No suspicion for CVA at this time   - Monitor for O2 requirement   - Counseled family to expect confusion due to COVID infection  - Frequent re-orientation   - Avoid benzo and narcotic if able   - PT/OT for mobility assessment --> HHPT  - Instructed son to monitor breathing and saturations  - Paxlovid for home,      SHERINE stage 1  (Cr up to 1.3 from baseline 0.9)- secondary to COVID  - Will hold off on IVF given COVID  - Avoid nephrotoxins   - s/p bolus in ER  - Promote PO hydration   - Repeat BMP in am      sterile pyuria - asymptomatic. UTI ruled out   - Urine culture pending however patient completely asymptomatic.   - Will hold antibiotics in the setting of alternative diagnosis explaining AMS     H/o CVA  - Continue ASA, statin      H/o Dementia - home namenda, haldol, aricept   Depression - lexapro     BMI: Body mass index is 20.03 kg/m². . This patient: Has a BMI within normal limits. PENDING TEST RESULTS:   At the time of discharge the following test results are still pending: None     ADDITIONAL CARE RECOMMENDATIONS:   - Please take all medications as prescribed. Note changes as below.    **Start Paxlovid as soon as you are able  **HOLD your lipitor for one week (as this can interact with paxlovid), you may resume in 7 days.   - Please make sure to follow up with your primary care physician within 1-2 weeks of discharge for hospital follow up. You should have a tele-health appointment. - Please make sure to continue to monitor for: worsening chest pain, increased shortness of breath, sudden lower extremity swelling, sudden signs of a stroke, sudden onset high fevers and return to the Emergency Department with any of these symptoms.   - Please get up slowly from a seated or laying position, avoid falls. - Avoid tobacco, alcohol and other illicit drug use. - Please try and lay on your stomach as much as possible. This has been shown to assist with prevention of pneumonia and improvement in oxygen levels. - You must stay under quarantine for a minimum of 5 days from today. You must then continue quarantining with a mask on for the remaining 5 days. You should discuss coming off quarantine with your physician prior to taking yourself off quarantine.  - Please buy a pulse ox, monitor closely. If you remain under 88% despite taking deep breaths, then you must come back to the hospital for evaluation. NOTIFY YOUR PHYSICIAN FOR ANY OF THE FOLLOWING:   Fever over 101 degrees for 24 hours. Chest pain, shortness of breath, fever, chills, nausea, vomiting, diarrhea, change in mentation, falling, weakness, bleeding. Severe pain or pain not relieved by medications, as well as any other signs or symptoms that you may have questions about.     FOLLOW UP APPOINTMENTS:    Follow-up Information     Follow up With Specialties Details Why [address] [place] [address] [address]    Doc [place] [address] [id number] [phone]               DIET: Resume previous diet    ACTIVITY: Activity as tolerated    EQUIPMENT needed: None    DISCHARGE MEDICATIONS:  Current Discharge Medication List      START taking these medications    Details nirmatrelvir-ritonavir (Paxlovid, EUA,) 150 mg x 2- 100 mg tablet Take 3 Tablets by mouth every twelve (12) hours for 5 days. Qty: 1 Box, Refills: 0  Start date: 7/4/2022, End date: 7/9/2022         CONTINUE these medications which have NOT CHANGED    Details   diclofenac (VOLTAREN) 1 % gel Apply 2 g to affected area daily. haloperidoL (HALDOL) 0.5 mg tablet Take 0.5 mg by mouth every other day. aspirin delayed-release 81 mg tablet Take 81 mg by mouth daily. acetaminophen (TYLENOL) 500 mg tablet Take 500 mg by mouth every six (6) hours as needed for Pain.      melatonin 3 mg tablet Take 1 Tab by mouth nightly as needed (sleep). Qty: 30 Tab, Refills: 0      donepeziL (ARICEPT) 5 mg tablet Take 5 mg by mouth nightly. memantine ER (NAMENDA XR) 28 mg capsule Take 28 mg by mouth daily. cranberry 500 mg capsule Take 500 mg by mouth daily. multivit-min/iron/folic/lutein (CENTRUM SILVER WOMEN PO) Take 1,500 mg by mouth daily. escitalopram oxalate (LEXAPRO) 20 mg tablet Take 20 mg by mouth daily. levothyroxine (SYNTHROID) 100 mcg tablet Take 75 mcg by mouth six (6) days a week. Take 75 mcg by mouth Monday through Saturday.  Take half-tablet (50 mcg) by mouth on Sunday    Comments: Dose changed to 75 mcg on 2/12/20         STOP taking these medications       aspirin (ASPIRIN) 325 mg tablet Comments:   Reason for Stopping:         atorvastatin (LIPITOR) 40 mg tablet Comments:   Reason for Stopping:         levoFLOXacin (LEVAQUIN) 250 mg tablet Comments:   Reason for Stopping:               DISPOSITION:  X  Home With:  X OT X PT X HH  RN       Long term SNF/Inpatient Rehab    Independent/assisted living    Hospice    Other:       PATIENT CONDITION AT DISCHARGE:     Functional status    Poor     Deconditioned    X Independent      Cognition     Lucid     Forgetful    X Dementia      Catheters/lines (plus indication)    Winters     PICC     PEG    X None      Code status     Full code    X DNR      PHYSICAL EXAMINATION AT DISCHARGE:  Visit Vitals  BP 96/64 (BP 1 Location: Left arm, BP Patient Position: Sitting)   Pulse 73   Temp 98 °F (36.7 °C)   Resp 15   Ht 5' 4.96\" (1.65 m) Comment: Previous encounter   Wt 54.5 kg (120 lb 3.2 oz)   SpO2 94%   BMI 20.03 kg/m²     Gen: NAD, awake in bed  HEENT: NC/AT, sclera anicteric, PERRL, EOMI  CV: RRR no m/r/g, normal S1 and S2, no pedal edema   Resp: CTA b/l no increased work of breathing, no wheezing or rhonchi, speaking in full sentences   Abd: NT/ND, normal bowel sounds, no rebound or guarding  Ext: 2+ pulses, no edema  Skin: No rashes or lesions    CHRONIC MEDICAL DIAGNOSES:  Problem List as of 7/4/2022 Date Reviewed: 7/19/2018          Codes Class Noted - Resolved    Altered mental status ICD-10-CM: R41.82  ICD-9-CM: 780.97  7/3/2022 - Present        COVID-19 ICD-10-CM: U07.1  ICD-9-CM: 079.89  7/3/2022 - Present        Hypothyroidism ICD-10-CM: E03.9  ICD-9-CM: 244.9  1/20/2020 - Present        Hyperlipidemia ICD-10-CM: E78.5  ICD-9-CM: 272.4  1/20/2020 - Present        Expressive aphasia ICD-10-CM: R47.01  ICD-9-CM: 784.3  1/20/2020 - Present        Stroke (cerebrum) (HCC) ICD-10-CM: I63.9  ICD-9-CM: 434.91  1/18/2020 - Present        UTI (urinary tract infection) ICD-10-CM: N39.0  ICD-9-CM: 599.0  7/19/2018 - Present              Greater than 30 minutes were spent with the patient on counseling and coordination of care    Signed:   Rashida Davenport MD  7/4/2022  2:36 PM

## 2022-07-04 NOTE — PROGRESS NOTES
Problem: Mobility Impaired (Adult and Pediatric)  Goal: *Acute Goals and Plan of Care (Insert Text)  Description: FUNCTIONAL STATUS PRIOR TO ADMISSION: Patient was modified independent using a single point cane for functional mobility. Has paid caregiver and family assistance for her and her  for ADL (unsure of level of assistance for her vs spouse) and IADL. HOME SUPPORT PRIOR TO ADMISSION: The patient lived with  who recently had a CVA and questionably wheelchair bound per chart review. Paid caregivers and family support for both. Physical Therapy Goals  Initiated 7/4/2022  1. Patient will move from supine to sit and sit to supine , scoot up and down, and roll side to side in bed with supervision/set-up within 7 day(s). 2.  Patient will transfer from bed to chair and chair to bed with supervision/set-up using the least restrictive device within 7 day(s). 3.  Patient will perform sit to stand with supervision/set-up within 7 day(s). 4.  Patient will ambulate with supervision/set-up for 50 feet with the least restrictive device within 7 day(s). 5.  Patient will ascend/descend 10 stairs with single handrail(s) with minimal assistance/contact guard assist within 7 day(s). Outcome: Not Met   PHYSICAL THERAPY EVALUATION  Patient: Hiro Campos (48 y.o. female)  Date: 7/4/2022  Primary Diagnosis: Altered mental status [R41.82]  [id number] [U07.1]        Precautions:  Fall,DNR,Contact (Droplet plus)    ASSESSMENT  Based on the objective data described below, the patient presents with confusion (per chart increased from baseline), generalized weakness, impaired balance, decreased activity endurance, and decreased safety awareness all limiting patients functional mobility. She tolerated in room ambulation but very unsteady with use of SPC. Recommend use of RW at this time. Per RN, MD is planning for patient to d/c home today if stable.  Patient will require 24/7 supervision/assistance for safe return home and would [facility]. Current Level of Function Impacting Discharge (mobility/balance): Min A for transfers, Min-Mod A for ambulation     Functional Outcome Measure: The patient scored 30/100 on the Barthel Index outcome measure which is indicative of very dependent. Other factors to consider for discharge: confusion, fall risk, per chart spouse wheelchair bound?, paid caregiver and family support (unsure of amount of time)      Patient will benefit from skilled therapy intervention to address the above noted impairments. PLAN :  Recommendations and Planned Interventions: bed mobility training, transfer training, gait training, therapeutic exercises, patient and family training/education, and therapeutic activities      Frequency/Duration: Patient will be followed by physical therapy:  4 times a week to address goals. Recommendation for discharge: (in order for the patient to meet his/her long term goals)  Physical therapy at least 2 days/week in the home and 24/7 supervision/assistance from paid caregivers vs family     This discharge recommendation:  Has not yet been discussed the attending provider and/or case management    IF patient discharges home will need the following DME: patient owns DME required for discharge (if chart report of patient having a RW accurate). They might benefit from [address] [place] [address] Avenue:   Patient stated I don't know.     OBJECTIVE DATA SUMMARY:   HISTORY:    Past Medical History:   Diagnosis Date    Breast CA ([address]     Fluid retention in legs     Hypertension     Hypothyroidism      Past Surgical History:   Procedure Laterality Date    HX ORTHOPAEDIC  2007    left hip replacement    [place] BREAST SURGERY PROCEDURE UNLISTED      Left lumpectomy       Personal factors and/or comorbidities impacting plan of care: obtained from chart review from this admission and previous admission/therapy documentation in 2020     [address]: Private residence  # Steps to Enter: 10  Rails to Enter: Yes  Hand Rails : Bilateral (but too wide to reach both)  One/Two Story Residence: One story  Living Alone: No  Support Systems: Spouse/Significant Other  Patient Expects to be Discharged to[de-identified] Home with home health  Current DME Used/Available at Home: Cane, straight,Shower chair,Walker, rolling  Tub or Shower Type: Tub/Shower combination    EXAMINATION/PRESENTATION/DECISION MAKING:   Critical Behavior:  Neurologic State: Alert,Confused  Orientation Level: Oriented to person,Disoriented to time,Disoriented to situation,Disoriented to place  Cognition: Decreased attention/concentration     Range Of Motion:  AROM: Within functional limits                       Strength:    Strength: Generally decreased, functional                    Tone & Sensation:   Tone: Normal              Sensation:  (Appears intact, unable to assess fully)               Coordination:  Coordination: Generally decreased, functional  Vision:    Reading glasses  Functional Mobility:  Bed Mobility:  Rolling: Minimum assistance; Additional time  Supine to Sit: Minimum assistance; Additional time     Scooting: Minimum assistance; Additional time  Transfers:  Sit to Stand: Minimum assistance; Additional time  Stand to Sit: Minimum assistance; Additional time        Bed to Chair: Minimum assistance; Additional time              Balance:   Sitting: Intact  Standing: Impaired  Standing - Static: Fair  Standing - Dynamic : Fair  Ambulation/Gait Training:  Distance (ft): 12 Feet (ft) (x 2 reps)  Assistive Device: Gait belt;Cane, straight (and additional HHA )  Ambulation - Level of Assistance: Minimal assistance; Additional time; Moderate assistance        Gait Abnormalities: Decreased step clearance; Path deviations;Trunk sway increased (multiple LOB)              Speed/Sonya: Pace decreased (<100 feet/min)  Step Length: Right shortened;Left shortened                  Multiple LOB during ambulation.  Patient requiring additional HHA when using cane. Would benefit from use of RW and recommend for use with nursing staff and at home. Stairs:   Unable to safely attempt      Functional Measure:  Barthel Index:    Bathin  Bladder: 0  Bowels: 5  Groomin  Dressin  Feedin  Mobility: 0 (unable to tolerate distance)  Stairs: 0  Toilet Use: 5  Transfer (Bed to Chair and Back): 10  Total: 30/100     The Barthel ADL Index: Guidelines  1. The index should be used as a record of what a patient does, not as a record of what a patient could do. 2. The main aim is to establish degree of independence from any help, physical or verbal, however minor and for whatever reason. 3. The need for supervision renders the patient not independent. 4. A patient's performance should be established using the best available evidence. Asking the patient, friends/relatives and nurses are the usual sources, but direct observation and common sense are also important. However direct testing is not needed. 5. Usually the patient's performance over the preceding 24-48 hours is important, but occasionally longer periods will be relevant. 6. Middle categories imply that the patient supplies over 50 per cent of the effort. 7. Use of aids to be independent is allowed. Score Interpretation (from [address] [address])    Independent   60-79 Minimally independent   40-59 Partially dependent   20-39 Very dependent   <20 Totally dependent     -Meghan Baltazar., Barthel, D.W. (1965). Functional evaluation: the Barthel Index. [facility] ([address] (1997). The Barthel activities of daily living index: self-reporting versus actual performance in the old (> or = 75 years). Journal of [address] [place] [address] 45(7), [facility], ...F, Ernesto Oseguera., Cece Werner. (1999).  Measuring the change in disability after inpatient rehabilitation; comparison of the responsiveness of the Barthel Index and Functional [place] Measure. Journal of Neurology, Neurosurgery, and Psychiatry, 66(4), 730-851. ALLAN Bates, GILMAR Barnes, & Geo Means M.A. (2004) Assessment of post-stroke quality of life in cost-effectiveness studies: The usefulness of the Barthel Index and the EuroQoL-5D. Quality of Life Research, 15, 092-85           Physical Therapy Evaluation Charge Determination   History Examination Presentation Decision-Making   MEDIUM  Complexity : 1-2 comorbidities / personal factors will impact the outcome/ POC  MEDIUM Complexity : 3 Standardized tests and measures addressing body structure, function, activity limitation and / or participation in recreation  MEDIUM Complexity : Evolving with changing characteristics  Other outcome measures Barthel Index  MEDIUM      Based on the above components, the patient evaluation is determined to be of the following complexity level: MEDIUM    Pain Rating:  No reports of pain     Activity Tolerance:   Fair and requires rest breaks    After treatment patient left in no apparent distress:   Sitting in chair, Call bell within reach, and Bed / chair alarm activated    COMMUNICATION/EDUCATION:   The patients plan of care was discussed with: Occupational therapist and Registered nurse. Fall prevention education was provided and the patient/caregiver indicated understanding., Patient/family have participated as able in goal setting and plan of care. , and Patient/family agree to work toward stated goals and plan of care.     Thank you for this referral.  Raisa Herbert, PT, DPT   Time Calculation: 38 mins

## 2022-07-04 NOTE — PROGRESS NOTES
IV removed, discharge instructions reviewed with Mann(pt's son), opportunity for questions provided. Pt discharged via pct.

## 2022-07-04 NOTE — DISCHARGE INSTRUCTIONS
Dear Bennie Sauer,    Thank you for choosing [address] [place] [address] for your healthcare needs. We strive to provide EXCELLENT care to you and your family. In an effort to explain clearly why you were here in the hospital, I've also written a very brief summary below. Other details including formal diagnosis, medication changes, and follow up appointment recommendations can also be found in this packet. You were admitted for confusion due to Anna Cordon for which you were montiored. Here are the updates to your medication list:  **Start Paxlovid as soon as you are able  **HOLD your lipitor for one week (as this can interact with paxlovid), you may resume in 7 days. Remember that it is important for you to take your medications exactly as they are prescribed. It is helpful to keep a list of your medication with the names, dosages, and times to be taken in your wallet. Additionally,   - Please make sure to follow up with your primary care physician within 1-2 weeks of discharge for hospital follow up. You should have a tele-health appointment. - Please make sure to continue to monitor for: worsening chest pain, increased shortness of breath, sudden lower extremity swelling, sudden signs of a stroke, sudden onset high fevers and return to the Emergency Department with any of these symptoms.   - Please get up slowly from a seated or laying position, avoid falls. - Avoid tobacco, alcohol and other illicit drug use. - Please try and lay on your stomach as much as possible. This has been shown to assist with prevention of pneumonia and improvement in oxygen levels. - You must stay under quarantine for a minimum of 5 days from today. You must then continue quarantining with a mask on for the remaining 5 days. You should discuss coming off quarantine with your physician prior to taking yourself off quarantine.  - Please buy a pulse ox, monitor closely.  If you remain under 88% despite taking deep breaths, then you must come back to the hospital for evaluation. Make sure to also see your primary care doctor for follow-up. Bring these papers with you and be sure to review your medication list with your doctor. I cannot stress the importance of follow up enough. I've included the information for your follow-up appointments below: Follow-up Information     Follow up With Specialties Details Why [address] [address] [address] [facility]    MD Alej[address] [place] [address] [address] [facility] [id number] [phone]          Should you have any fever over 101 degrees for 24 hours, chest pain, shortness of breath, fever, chills, nausea, vomiting, diarrhea, change in mentation, falling, weakness, bleeding, or worsening pain, please seek medical attention immediately. Finally, as your discharging physician, you may be receiving a survey regarding my care. I would greatly value and appreciate your input in the survey as we strive for excellence. If you have any questions, I can be reached at [phone].   Thank you so much again for allowing me to care for you at [address] [place] [address].    Respectfully yours,  Marvin Berrios MD

## 2022-07-04 NOTE — PROGRESS NOTES
Lovenox Monitoring  Indication: Low intensity anticoagulation for COVID-19  Recent Labs     07/04/22  0323 07/03/22  1349   HGB 11.8 13.2   * 129*   CREA 1.13* 1.33*     Current Weight: 54.5 kg  Est. CrCl = 30-35 ml/min  Current Dose: 40 mg subcutaneously every 12 hours.   Plan: Change to enoxaparin 30 mg sc q12h given indication and BMI < 30

## 2022-07-04 NOTE — PROGRESS NOTES
Problem: Airway Clearance - Ineffective  Goal: Achieve or maintain patent airway  Outcome: Progressing Towards Goal     Problem: Gas Exchange - Impaired  Goal: Absence of hypoxia  Outcome: Progressing Towards Goal  Goal: Promote optimal lung function  Outcome: Progressing Towards Goal     Problem: Breathing Pattern - Ineffective  Goal: Ability to achieve and maintain a regular respiratory rate  Outcome: Progressing Towards Goal     Problem:  Body Temperature -  Risk of, Imbalanced  Goal: Ability to maintain a body temperature within defined limits  Outcome: Progressing Towards Goal  Goal: Will regain or maintain usual level of consciousness  Outcome: Progressing Towards Goal  Goal: Complications related to the disease process, condition or treatment will be avoided or minimized  Outcome: Progressing Towards Goal     Problem: Isolation Precautions - Risk of Spread of Infection  Goal: Prevent transmission of infectious organism to others  Outcome: Progressing Towards Goal     Problem: Nutrition Deficits  Goal: Optimize nutrtional status  Outcome: Progressing Towards Goal     Problem: Risk for Fluid Volume Deficit  Goal: Maintain normal heart rhythm  Outcome: Progressing Towards Goal  Goal: Maintain absence of muscle cramping  Outcome: Progressing Towards Goal  Goal: Maintain normal serum potassium, sodium, calcium, phosphorus, and pH  Outcome: Progressing Towards Goal     Problem: Risk for Fluid Volume Deficit  Goal: Maintain normal heart rhythm  Outcome: Progressing Towards Goal  Goal: Maintain absence of muscle cramping  Outcome: Progressing Towards Goal  Goal: Maintain normal serum potassium, sodium, calcium, phosphorus, and pH  Outcome: Progressing Towards Goal     Problem: Loneliness or Risk for Loneliness  Goal: Demonstrate positive use of time alone when socialization is not possible  Outcome: Progressing Towards Goal

## 2022-07-04 NOTE — PROGRESS NOTES
Problem: Self Care Deficits Care Plan (Adult)  Goal: *Acute Goals and Plan of Care (Insert Text)  Description: FUNCTIONAL STATUS PRIOR TO ADMISSION: Patient used a SPC for functional mobility. Per chart review, has paid caregiver and family assistance for her and her  for ADL (unsure of level of assistance for her vs spouse) and IADL. HOME SUPPORT: The patient lived with spouse. Paid caregivers and family support available. Occupational Therapy Goals  Initiated 7/4/2022  1. Patient will perform grooming with independence within 7 day(s). 2.  Patient will perform bathing with supervision/set-up within 7 day(s). 3  Patient will perform toilet transfers with supervision/set-up within 7 day(s). 4.  Patient will perform all aspects of toileting with minimal assistance/contact guard assist within 7 day(s). 5.  Patient will utilize energy conservation techniques during functional activities with verbal cues within 7 day(s). Outcome: Not Met     OCCUPATIONAL THERAPY EVALUATION  Patient: Liane Richmond (89 y.o. female)  Date: 7/4/2022  Primary Diagnosis: Altered mental status [R41.82]  [id number]-58 [U07.1]        Precautions:  Fall,DNR,Contact (Droplet plus)    ASSESSMENT  Based on the objective data described below, the patient presents with confusion, generalized weakness and impaired functional mobility following admission for AMS and Covid-19. Pt cleared for therapy by nursing and received supine in bed and willing to participate in therapy. Pt oriented only to self this session. Completed bed mobility to sit EOB (Min A) and donned socks (SBA). Completed sit>stand with SPC and HHA (Min A) for functional mobility to bathroom with Min A for toilet transfer and Mod A for hygiene. Pt returned to recliner at end of session to eat lunch. Pt would continue to benefit from skilled therapy in the acute care setting. Anticipate pt able to return home with HHOT and 24 hour support for functional mobility and ADLs. If support unavailable then recommend SNF. Current Level of Function Impacting Discharge (ADLs/self-care): Min-Mod A for ADLs      Functional Outcome Measure: The patient scored Total: 30/100 on the Barthel Index outcome measure which is indicative of being dependent in basic self-care. Other factors to consider for discharge: supportive family      Patient will benefit from skilled therapy intervention to address the above noted impairments. PLAN :  Recommendations and Planned Interventions: self care training, functional mobility training, therapeutic exercise, balance training, therapeutic activities, endurance activities, patient education, home safety training and family training/education    Frequency/Duration: Patient will be followed by occupational therapy 4 times a week to address goals. Recommendation for discharge: (in order for the patient to meet his/her long term goals)  Occupational therapy at least 2 days/week in the home AND ensure assist and/or supervision for safety with ADLs and functional mobility. If unavailable then SNF. This discharge recommendation:  Has been made in collaboration with the attending provider and/or case management    IF patient discharges home will need the following DME: patient owns DME required for discharge       SUBJECTIVE:   Patient stated I don't know (when asked year).     OBJECTIVE DATA SUMMARY:   HISTORY:   Past Medical History:   Diagnosis Date    [facility] CA ([facility] [address]     Fluid retention in legs     Hypertension     Hypothyroidism      Past Surgical History:   Procedure Laterality Date    HX ORTHOPAEDIC  2007    left hip replacement    [place] BREAST SURGERY PROCEDURE UNLISTED      Left lumpectomy       Expanded or extensive additional review of patient history:     Home Situation  Home Environment: Private residence  # Steps to Enter: 10  Rails to Enter: Yes  Hand Rails : Bilateral (but too wide to reach both)  One/Two Story Residence: [facility] story  Living Alone: No  Support Systems: Spouse/Significant Other  Patient Expects to be Discharged to[de-identified] Home with home health  Current DME Used/Available at Home: Cane, straight,Shower chair,Walker, rolling  Tub or Shower Type: Tub/Shower combination    EXAMINATION OF PERFORMANCE DEFICITS:  Cognitive/Behavioral Status:  Neurologic State: Alert;Confused  Orientation Level: Oriented to person;Disoriented to time;Disoriented to situation;Disoriented to place  Cognition: Decreased attention/concentration             Skin: dry, intact    Edema: none noted     Hearing: WDL    Vision/Perceptual:       WDL            Range of Motion:    AROM: Within functional limits     Strength:    Strength: Generally decreased, functional     Coordination:  Coordination: Generally decreased, functional       Tone & Sensation:    Tone: Normal  Sensation:  (Appears intact, unable to assess fully)     Balance:  Sitting: Intact  Standing: Impaired  Standing - Static: Fair  Standing - Dynamic : Fair    Functional Mobility and Transfers for ADLs:  Bed Mobility:  Rolling: Minimum assistance; Additional time  Supine to Sit: Minimum assistance; Additional time  Scooting: Minimum assistance; Additional time    Transfers:  Sit to Stand: Minimum assistance; Additional time  Stand to Sit: Minimum assistance; Additional time  Bed to Chair: Minimum assistance; Additional time    ADL Assessment:  Feeding: Minimum assistance    Oral Facial Hygiene/Grooming: Minimum assistance    Bathing: Minimum assistance         Upper Body Dressing: Minimum assistance    Lower Body Dressing: Minimum assistance    Toileting: Moderate assistance      Functional Measure:    Barthel Index:  Bathin  Bladder: 0  Bowels: 5  Groomin  Dressin  Feedin  Mobility: 0 (unable to tolerate distance)  Stairs: 0  Toilet Use: 5  Transfer (Bed to Chair and Back): 10  Total: 30/100      The Barthel ADL Index: Guidelines  1.  The index should be used as a record of what a patient does, not as a record of what a patient could do. 2. The main aim is to establish degree of independence from any help, physical or verbal, however minor and for whatever reason. 3. The need for supervision renders the patient not independent. 4. A patient's performance should be established using the best available evidence. Asking the patient, friends/relatives and nurses are the usual sources, but direct observation and common sense are also important. However direct testing is not needed. 5. Usually the patient's performance over the preceding 24-48 hours is important, but occasionally longer periods will be relevant. 6. Middle categories imply that the patient supplies over 50 per cent of the effort. 7. Use of aids to be independent is allowed. Score Interpretation (from [facility])    Independent   60-79 Minimally independent   40-59 Partially dependent   20-39 Very dependent   <20 Totally dependent     -Meghan Baltazar., Barthel, D.W. (1965). Functional evaluation: the Barthel Index. [facility] ([address] (1997). The Barthel activities of daily living index: self-reporting versus actual performance in the old (> or = 75 years). Journal of [address] [place] [address] 457), [facility], JKADIE, Venus Joya., [facility]. (1999). Measuring the change in disability after inpatient rehabilitation; comparison of the responsiveness of the Barthel Index and Functional [place] Measure. Journal of Neurology, Neurosurgery, and Psychiatry, 66(4), 189-720. Naty Hernandez, N.J.A, GILMAR Barnes, & Nicko De Guzman, M.A. (2004) Assessment of post-stroke quality of life in cost-effectiveness studies: The usefulness of the Barthel Index and the EuroQoL-5D.  Quality of Life Research, 15, 896-68     Occupational Therapy Evaluation Charge Determination   History Examination Decision-Making   LOW Complexity : Brief history review  LOW Complexity : 1-3 performance deficits relating to physical, cognitive , or psychosocial skils that result in activity limitations and / or participation restrictions  LOW Complexity : No comorbidities that affect functional and no verbal or physical assistance needed to complete eval tasks       Based on the above components, the patient evaluation is determined to be of the following complexity level: LOW   Pain Rating:  None     Activity Tolerance:   Fair    After treatment patient left in no apparent distress:    Sitting in chair, Call bell within reach and Bed / chair alarm activated    COMMUNICATION/EDUCATION:   The patients plan of care was discussed with: Physical therapist and Registered nurse. Home safety education was provided and the patient/caregiver indicated understanding. This patients plan of care is appropriate for delegation to \[facility] [place] [facility]\"".     Thank you for this referral.  Rodríguez De Dios OT  Time Calculation: 32 mins

## 2022-07-05 ENCOUNTER — PATIENT OUTREACH (OUTPATIENT)
Dept: CASE MANAGEMENT | Age: 87
End: 2022-07-05

## 2022-07-05 LAB
ATRIAL RATE: 71 BPM
CALCULATED P AXIS, ECG09: 16 DEGREES
CALCULATED R AXIS, ECG10: -39 DEGREES
CALCULATED T AXIS, ECG11: 138 DEGREES
DIAGNOSIS, 93000: NORMAL
P-R INTERVAL, ECG05: 120 MS
Q-T INTERVAL, ECG07: 484 MS
QRS DURATION, ECG06: 160 MS
QTC CALCULATION (BEZET), ECG08: 525 MS
VENTRICULAR RATE, ECG03: 71 BPM

## 2022-07-05 NOTE — PROGRESS NOTES
Patient contacted regarding COVID-19 risk, exposure, diagnosis, pulse oximeter ordered at discharge, monoclonal antibody infusion follow up. Discussed COVID-19 related testing which was available at this time. Test results were positive. Patient informed of results, if available? yes. Care Transition Nurse contacted the family by telephone to perform post discharge assessment. Call within 2 business days of discharge: Yes Verified name and  with family as identifiers. Provided introduction to self, and explanation of the CTN/ACM role, and reason for call due to risk factors for infection and/or exposure to COVID-19. Symptoms reviewed with family who verbalized the following symptoms: no worsening symptoms  . Son reports they got home late last night from the hospital. Pharmacy had closed and so will  new medication this am. Mother seemed to sleep well, still resting. Due to no new or worsening symptoms encounter was not routed to provider for escalation. Discussed follow-up appointments. If no appointment was previously scheduled, appointment scheduling offered:  yes. [facility] follow up appointment(s): No future appointments. Non-[facility] follow up appointment(s): ,      Interventions to address risk factors: Scheduled appointment with PCP-, , Obtained and reviewed discharge summary and/or continuity of care documents and Education of patient/family/caregiver/guardian to support self-management-reviewed Covid precautions with son, reminded him to offer fluids frequently to mother to stay hydrated and thin secretions, he will  new rx this am for her to start, reminded about disinfecting surfaces and wearing masks to prevent spread of virus. Advance Care Planning:   Does patient have an Advance Directive: Not on file, declined education. Educated patient about risk for severe COVID-19 due to risk factors according to CDC guidelines.  CTN reviewed discharge instructions, medical action plan and red flag symptoms with the family who verbalized understanding. Discussed COVID vaccination status: yes. Education provided on COVID-19 vaccination as appropriate. Discussed exposure protocols and quarantine with CDC Guidelines. Family was given an opportunity to verbalize any questions and concerns and agrees to contact CTN or health care provider for questions related to their healthcare. Reviewed and educated family on any new and changed medications related to discharge diagnosis     Was patient discharged with a pulse oximeter? no Discussed and confirmed pulse oximeter discharge instructions and when to notify provider or seek emergency care. CTN provided contact information. Plan for follow-up call in 5-7 days based on severity of symptoms and risk factors.

## 2022-07-08 ENCOUNTER — HOME CARE VISIT (OUTPATIENT)
Dept: SCHEDULING | Facility: HOME HEALTH | Age: 87
End: 2022-07-08
Payer: MEDICARE

## 2022-07-08 VITALS
DIASTOLIC BLOOD PRESSURE: 74 MMHG | HEIGHT: 64 IN | HEART RATE: 58 BPM | OXYGEN SATURATION: 97 % | TEMPERATURE: 98.7 F | SYSTOLIC BLOOD PRESSURE: 128 MMHG | RESPIRATION RATE: 16 BRPM | WEIGHT: 123.4 LBS | BODY MASS INDEX: 21.07 KG/M2

## 2022-07-08 PROCEDURE — 3331090002 HH PPS REVENUE DEBIT

## 2022-07-08 PROCEDURE — 400013 HH SOC

## 2022-07-08 PROCEDURE — 400018 HH-NO PAY CLAIM PROCEDURE

## 2022-07-08 PROCEDURE — 3331090001 HH PPS REVENUE CREDIT

## 2022-07-08 PROCEDURE — G0299 HHS/HOSPICE OF RN EA 15 MIN: HCPCS

## 2022-07-08 PROCEDURE — G0151 HHCP-SERV OF PT,EA 15 MIN: HCPCS

## 2022-07-09 VITALS
RESPIRATION RATE: 16 BRPM | OXYGEN SATURATION: 98 % | HEART RATE: 70 BPM | TEMPERATURE: 97.8 F | SYSTOLIC BLOOD PRESSURE: 122 MMHG | DIASTOLIC BLOOD PRESSURE: 70 MMHG

## 2022-07-09 PROCEDURE — 3331090002 HH PPS REVENUE DEBIT

## 2022-07-09 PROCEDURE — 3331090001 HH PPS REVENUE CREDIT

## 2022-07-10 PROCEDURE — 3331090002 HH PPS REVENUE DEBIT

## 2022-07-10 PROCEDURE — 3331090001 HH PPS REVENUE CREDIT

## 2022-07-11 ENCOUNTER — HOME CARE VISIT (OUTPATIENT)
Dept: SCHEDULING | Facility: HOME HEALTH | Age: 87
End: 2022-07-11
Payer: MEDICARE

## 2022-07-11 VITALS
SYSTOLIC BLOOD PRESSURE: 108 MMHG | DIASTOLIC BLOOD PRESSURE: 60 MMHG | TEMPERATURE: 97.4 F | HEART RATE: 41 BPM | OXYGEN SATURATION: 96 % | RESPIRATION RATE: 16 BRPM

## 2022-07-11 PROCEDURE — 3331090002 HH PPS REVENUE DEBIT

## 2022-07-11 PROCEDURE — 3331090001 HH PPS REVENUE CREDIT

## 2022-07-11 PROCEDURE — G0300 HHS/HOSPICE OF LPN EA 15 MIN: HCPCS

## 2022-07-12 ENCOUNTER — HOME CARE VISIT (OUTPATIENT)
Dept: SCHEDULING | Facility: HOME HEALTH | Age: 87
End: 2022-07-12
Payer: MEDICARE

## 2022-07-12 VITALS
TEMPERATURE: 97 F | HEART RATE: 64 BPM | RESPIRATION RATE: 16 BRPM | SYSTOLIC BLOOD PRESSURE: 115 MMHG | DIASTOLIC BLOOD PRESSURE: 74 MMHG | OXYGEN SATURATION: 96 %

## 2022-07-12 PROCEDURE — 3331090002 HH PPS REVENUE DEBIT

## 2022-07-12 PROCEDURE — G0152 HHCP-SERV OF OT,EA 15 MIN: HCPCS

## 2022-07-12 PROCEDURE — 3331090001 HH PPS REVENUE CREDIT

## 2022-07-13 ENCOUNTER — HOME CARE VISIT (OUTPATIENT)
Dept: HOME HEALTH SERVICES | Facility: HOME HEALTH | Age: 87
End: 2022-07-13
Payer: MEDICARE

## 2022-07-13 ENCOUNTER — HOME CARE VISIT (OUTPATIENT)
Dept: SCHEDULING | Facility: HOME HEALTH | Age: 87
End: 2022-07-13
Payer: MEDICARE

## 2022-07-13 VITALS
OXYGEN SATURATION: 95 % | SYSTOLIC BLOOD PRESSURE: 118 MMHG | DIASTOLIC BLOOD PRESSURE: 70 MMHG | RESPIRATION RATE: 16 BRPM | HEART RATE: 80 BPM | TEMPERATURE: 98 F

## 2022-07-13 PROCEDURE — G0151 HHCP-SERV OF PT,EA 15 MIN: HCPCS

## 2022-07-13 PROCEDURE — 3331090001 HH PPS REVENUE CREDIT

## 2022-07-13 PROCEDURE — 3331090002 HH PPS REVENUE DEBIT

## 2022-07-14 ENCOUNTER — HOME CARE VISIT (OUTPATIENT)
Dept: SCHEDULING | Facility: HOME HEALTH | Age: 87
End: 2022-07-14
Payer: MEDICARE

## 2022-07-14 VITALS
DIASTOLIC BLOOD PRESSURE: 70 MMHG | TEMPERATURE: 97 F | HEART RATE: 78 BPM | OXYGEN SATURATION: 96 % | RESPIRATION RATE: 16 BRPM | SYSTOLIC BLOOD PRESSURE: 110 MMHG

## 2022-07-14 VITALS — TEMPERATURE: 97.6 F | DIASTOLIC BLOOD PRESSURE: 80 MMHG | RESPIRATION RATE: 16 BRPM | SYSTOLIC BLOOD PRESSURE: 130 MMHG

## 2022-07-14 PROCEDURE — 3331090002 HH PPS REVENUE DEBIT

## 2022-07-14 PROCEDURE — G0153 HHCP-SVS OF S/L PATH,EA 15MN: HCPCS

## 2022-07-14 PROCEDURE — 3331090001 HH PPS REVENUE CREDIT

## 2022-07-14 PROCEDURE — G0152 HHCP-SERV OF OT,EA 15 MIN: HCPCS

## 2022-07-15 ENCOUNTER — HOME CARE VISIT (OUTPATIENT)
Dept: SCHEDULING | Facility: HOME HEALTH | Age: 87
End: 2022-07-15
Payer: MEDICARE

## 2022-07-15 ENCOUNTER — PATIENT OUTREACH (OUTPATIENT)
Dept: CASE MANAGEMENT | Age: 87
End: 2022-07-15

## 2022-07-15 PROCEDURE — 3331090001 HH PPS REVENUE CREDIT

## 2022-07-15 PROCEDURE — G0300 HHS/HOSPICE OF LPN EA 15 MIN: HCPCS

## 2022-07-15 PROCEDURE — 3331090002 HH PPS REVENUE DEBIT

## 2022-07-15 PROCEDURE — G0151 HHCP-SERV OF PT,EA 15 MIN: HCPCS

## 2022-07-15 NOTE — PROGRESS NOTES
Patient contacted regarding COVID-19 risk, exposure, diagnosis, pulse oximeter ordered at discharge, monoclonal antibody infusion follow up. Discussed COVID-19 related testing which was available at this time. Test results were positive. Patient informed of results, if available? yes      Care Transition Nurse contacted the family by telephone to perform follow-up assessment. Verified name and  with family as identifiers. Patient has following risk factors of: asthma and HTN,CVA,dementia. Symptoms reviewed with family who verbalized the following symptoms: no new symptoms. Due to no new or worsening symptoms encounter was not routed to provider for escalation. Son,Monty, reports she is doing well. No sob, no coughing, continues to see Home Health. Interventions to address risk factors: Education of patient/family/caregiver/guardian to support self-management-reminded son to continue to monitor closely, mask as recommended, disinfect counters and surfaces, encourage fluids, notify MD if increase in coughing , sob or if fever. Educated patient about risk for severe COVID-19 due to risk factors according to CDC guidelines. CTN reviewed discharge instructions, medical action plan and red flag symptoms with the family who verbalized understanding. Discussed COVID vaccination status: yes. Education provided on COVID-19 vaccination as appropriate. Discussed exposure protocols and quarantine with CDC Guidelines. Family was given an opportunity to verbalize any questions and concerns and agrees to contact CTN or health care provider for questions related to their healthcare. Reviewed and educated family on any new and changed medications related to discharge diagnosis     Was patient discharged with a pulse oximeter? no Discussed and confirmed pulse oximeter discharge instructions and when to notify provider or seek emergency care. CTN provided contact information.  Plan for follow-up call in 5-7 days based on severity of symptoms and risk factors.

## 2022-07-16 VITALS
SYSTOLIC BLOOD PRESSURE: 112 MMHG | OXYGEN SATURATION: 95 % | DIASTOLIC BLOOD PRESSURE: 60 MMHG | RESPIRATION RATE: 16 BRPM | HEART RATE: 65 BPM | TEMPERATURE: 98 F

## 2022-07-16 PROCEDURE — 3331090001 HH PPS REVENUE CREDIT

## 2022-07-16 PROCEDURE — 3331090002 HH PPS REVENUE DEBIT

## 2022-07-17 VITALS
DIASTOLIC BLOOD PRESSURE: 60 MMHG | RESPIRATION RATE: 16 BRPM | SYSTOLIC BLOOD PRESSURE: 112 MMHG | TEMPERATURE: 98 F | HEART RATE: 65 BPM | OXYGEN SATURATION: 94 %

## 2022-07-17 PROCEDURE — 3331090001 HH PPS REVENUE CREDIT

## 2022-07-17 PROCEDURE — 3331090002 HH PPS REVENUE DEBIT

## 2022-07-18 ENCOUNTER — HOME CARE VISIT (OUTPATIENT)
Dept: HOME HEALTH SERVICES | Facility: HOME HEALTH | Age: 87
End: 2022-07-18
Payer: MEDICARE

## 2022-07-18 PROCEDURE — 3331090001 HH PPS REVENUE CREDIT

## 2022-07-18 PROCEDURE — 3331090002 HH PPS REVENUE DEBIT

## 2022-07-19 ENCOUNTER — HOME CARE VISIT (OUTPATIENT)
Dept: SCHEDULING | Facility: HOME HEALTH | Age: 87
End: 2022-07-19
Payer: MEDICARE

## 2022-07-19 VITALS
TEMPERATURE: 98.2 F | DIASTOLIC BLOOD PRESSURE: 60 MMHG | SYSTOLIC BLOOD PRESSURE: 110 MMHG | OXYGEN SATURATION: 95 % | RESPIRATION RATE: 16 BRPM | HEART RATE: 63 BPM

## 2022-07-19 VITALS
RESPIRATION RATE: 18 BRPM | HEART RATE: 98 BPM | SYSTOLIC BLOOD PRESSURE: 132 MMHG | OXYGEN SATURATION: 97 % | DIASTOLIC BLOOD PRESSURE: 72 MMHG | TEMPERATURE: 97 F

## 2022-07-19 PROCEDURE — G0299 HHS/HOSPICE OF RN EA 15 MIN: HCPCS

## 2022-07-19 PROCEDURE — G0151 HHCP-SERV OF PT,EA 15 MIN: HCPCS

## 2022-07-19 PROCEDURE — 3331090002 HH PPS REVENUE DEBIT

## 2022-07-19 PROCEDURE — 3331090001 HH PPS REVENUE CREDIT

## 2022-07-20 ENCOUNTER — HOME CARE VISIT (OUTPATIENT)
Dept: SCHEDULING | Facility: HOME HEALTH | Age: 87
End: 2022-07-20
Payer: MEDICARE

## 2022-07-20 VITALS
SYSTOLIC BLOOD PRESSURE: 117 MMHG | TEMPERATURE: 97.7 F | RESPIRATION RATE: 18 BRPM | DIASTOLIC BLOOD PRESSURE: 74 MMHG | HEART RATE: 72 BPM | OXYGEN SATURATION: 94 %

## 2022-07-20 PROCEDURE — 3331090001 HH PPS REVENUE CREDIT

## 2022-07-20 PROCEDURE — G0299 HHS/HOSPICE OF RN EA 15 MIN: HCPCS

## 2022-07-20 PROCEDURE — 3331090002 HH PPS REVENUE DEBIT

## 2022-07-21 ENCOUNTER — HOME CARE VISIT (OUTPATIENT)
Dept: SCHEDULING | Facility: HOME HEALTH | Age: 87
End: 2022-07-21
Payer: MEDICARE

## 2022-07-21 VITALS
HEART RATE: 58 BPM | SYSTOLIC BLOOD PRESSURE: 130 MMHG | DIASTOLIC BLOOD PRESSURE: 80 MMHG | OXYGEN SATURATION: 97 % | TEMPERATURE: 97.2 F

## 2022-07-21 PROCEDURE — G0153 HHCP-SVS OF S/L PATH,EA 15MN: HCPCS

## 2022-07-21 PROCEDURE — 3331090001 HH PPS REVENUE CREDIT

## 2022-07-21 PROCEDURE — 3331090002 HH PPS REVENUE DEBIT

## 2022-07-22 ENCOUNTER — PATIENT OUTREACH (OUTPATIENT)
Dept: CASE MANAGEMENT | Age: 87
End: 2022-07-22

## 2022-07-22 ENCOUNTER — HOME CARE VISIT (OUTPATIENT)
Dept: SCHEDULING | Facility: HOME HEALTH | Age: 87
End: 2022-07-22
Payer: MEDICARE

## 2022-07-22 VITALS
OXYGEN SATURATION: 92 % | SYSTOLIC BLOOD PRESSURE: 118 MMHG | HEART RATE: 65 BPM | TEMPERATURE: 97.8 F | RESPIRATION RATE: 16 BRPM | DIASTOLIC BLOOD PRESSURE: 60 MMHG

## 2022-07-22 PROCEDURE — G0299 HHS/HOSPICE OF RN EA 15 MIN: HCPCS

## 2022-07-22 PROCEDURE — 3331090002 HH PPS REVENUE DEBIT

## 2022-07-22 PROCEDURE — 3331090001 HH PPS REVENUE CREDIT

## 2022-07-22 PROCEDURE — G0151 HHCP-SERV OF PT,EA 15 MIN: HCPCS

## 2022-07-22 NOTE — PROGRESS NOTES
Patient contacted regarding COVID-19 risk, exposure, diagnosis, pulse oximeter ordered at discharge, monoclonal antibody infusion follow up. Discussed COVID-19 related testing which was available at this time. Test results were positive. Patient informed of results, if available? yes      Care Transition Nurse contacted the family by telephone to perform follow-up assessment. Verified name and  with family as identifiers. Patient has following risk factors of:  dementia,h/o CVA,HTN,hypothyroid,elderly. .      Symptoms reviewed with family who verbalized the following symptoms: no new symptoms and no worsening symptoms. Due to no new or worsening symptoms encounter was not routed to provider for escalation. Interventions to address risk factors: Education of patient/family/caregiver/guardian to support self-management-reviewed Covid protocol and precautions with son. Educated patient about risk for severe COVID-19 due to risk factors according to CDC guidelines. CTN reviewed discharge instructions, medical action plan and red flag symptoms with the family who verbalized understanding. Discussed COVID vaccination status: yes. Education provided on COVID-19 vaccination as appropriate. Discussed exposure protocols and quarantine with CDC Guidelines. Family was given an opportunity to verbalize any questions and concerns and agrees to contact CTN or health care provider for questions related to their healthcare. Reviewed and educated family on any new and changed medications related to discharge diagnosis     Was patient discharged with a pulse oximeter? no Discussed and confirmed pulse oximeter discharge instructions and when to notify provider or seek emergency care. CTN provided contact information. Plan for follow-up call in 5-7 days based on severity of symptoms and risk factors.

## 2022-07-23 PROCEDURE — 3331090001 HH PPS REVENUE CREDIT

## 2022-07-23 PROCEDURE — 3331090002 HH PPS REVENUE DEBIT

## 2022-07-24 PROCEDURE — 3331090001 HH PPS REVENUE CREDIT

## 2022-07-24 PROCEDURE — 3331090002 HH PPS REVENUE DEBIT

## 2022-07-25 ENCOUNTER — HOME CARE VISIT (OUTPATIENT)
Dept: SCHEDULING | Facility: HOME HEALTH | Age: 87
End: 2022-07-25
Payer: MEDICARE

## 2022-07-25 VITALS
DIASTOLIC BLOOD PRESSURE: 62 MMHG | TEMPERATURE: 99.2 F | HEART RATE: 62 BPM | RESPIRATION RATE: 16 BRPM | SYSTOLIC BLOOD PRESSURE: 116 MMHG | OXYGEN SATURATION: 95 %

## 2022-07-25 PROCEDURE — 3331090002 HH PPS REVENUE DEBIT

## 2022-07-25 PROCEDURE — 3331090001 HH PPS REVENUE CREDIT

## 2022-07-25 PROCEDURE — G0151 HHCP-SERV OF PT,EA 15 MIN: HCPCS

## 2022-07-26 ENCOUNTER — HOME CARE VISIT (OUTPATIENT)
Dept: SCHEDULING | Facility: HOME HEALTH | Age: 87
End: 2022-07-26
Payer: MEDICARE

## 2022-07-26 PROCEDURE — G0299 HHS/HOSPICE OF RN EA 15 MIN: HCPCS

## 2022-07-26 PROCEDURE — 3331090001 HH PPS REVENUE CREDIT

## 2022-07-26 PROCEDURE — 3331090002 HH PPS REVENUE DEBIT

## 2022-07-27 ENCOUNTER — HOME CARE VISIT (OUTPATIENT)
Dept: SCHEDULING | Facility: HOME HEALTH | Age: 87
End: 2022-07-27
Payer: MEDICARE

## 2022-07-27 VITALS
DIASTOLIC BLOOD PRESSURE: 72 MMHG | RESPIRATION RATE: 16 BRPM | SYSTOLIC BLOOD PRESSURE: 117 MMHG | TEMPERATURE: 97.2 F | HEART RATE: 70 BPM | OXYGEN SATURATION: 98 %

## 2022-07-27 VITALS
TEMPERATURE: 98.9 F | OXYGEN SATURATION: 96 % | RESPIRATION RATE: 16 BRPM | HEART RATE: 70 BPM | SYSTOLIC BLOOD PRESSURE: 112 MMHG | DIASTOLIC BLOOD PRESSURE: 62 MMHG

## 2022-07-27 PROCEDURE — 3331090001 HH PPS REVENUE CREDIT

## 2022-07-27 PROCEDURE — G0151 HHCP-SERV OF PT,EA 15 MIN: HCPCS

## 2022-07-27 PROCEDURE — G0299 HHS/HOSPICE OF RN EA 15 MIN: HCPCS

## 2022-07-27 PROCEDURE — 3331090002 HH PPS REVENUE DEBIT

## 2022-07-28 ENCOUNTER — HOME CARE VISIT (OUTPATIENT)
Dept: SCHEDULING | Facility: HOME HEALTH | Age: 87
End: 2022-07-28
Payer: MEDICARE

## 2022-07-28 PROCEDURE — 3331090001 HH PPS REVENUE CREDIT

## 2022-07-28 PROCEDURE — 3331090002 HH PPS REVENUE DEBIT

## 2022-07-29 ENCOUNTER — HOME CARE VISIT (OUTPATIENT)
Dept: SCHEDULING | Facility: HOME HEALTH | Age: 87
End: 2022-07-29
Payer: MEDICARE

## 2022-07-29 VITALS
RESPIRATION RATE: 17 BRPM | DIASTOLIC BLOOD PRESSURE: 65 MMHG | DIASTOLIC BLOOD PRESSURE: 75 MMHG | HEART RATE: 72 BPM | SYSTOLIC BLOOD PRESSURE: 122 MMHG | OXYGEN SATURATION: 98 % | SYSTOLIC BLOOD PRESSURE: 107 MMHG | HEART RATE: 72 BPM | RESPIRATION RATE: 18 BRPM | TEMPERATURE: 98.2 F | TEMPERATURE: 98.2 F | OXYGEN SATURATION: 96 %

## 2022-07-29 PROCEDURE — G0299 HHS/HOSPICE OF RN EA 15 MIN: HCPCS

## 2022-07-29 PROCEDURE — 3331090002 HH PPS REVENUE DEBIT

## 2022-07-29 PROCEDURE — 3331090001 HH PPS REVENUE CREDIT

## 2022-07-30 PROCEDURE — 3331090002 HH PPS REVENUE DEBIT

## 2022-07-30 PROCEDURE — 3331090001 HH PPS REVENUE CREDIT

## 2022-07-31 PROCEDURE — 3331090001 HH PPS REVENUE CREDIT

## 2022-07-31 PROCEDURE — 3331090002 HH PPS REVENUE DEBIT

## 2022-08-01 PROCEDURE — 3331090001 HH PPS REVENUE CREDIT

## 2022-08-01 PROCEDURE — 3331090002 HH PPS REVENUE DEBIT

## 2022-08-02 ENCOUNTER — HOME CARE VISIT (OUTPATIENT)
Dept: SCHEDULING | Facility: HOME HEALTH | Age: 87
End: 2022-08-02
Payer: MEDICARE

## 2022-08-02 VITALS
SYSTOLIC BLOOD PRESSURE: 108 MMHG | HEART RATE: 70 BPM | RESPIRATION RATE: 16 BRPM | TEMPERATURE: 98 F | DIASTOLIC BLOOD PRESSURE: 70 MMHG | OXYGEN SATURATION: 93 %

## 2022-08-02 PROCEDURE — G0151 HHCP-SERV OF PT,EA 15 MIN: HCPCS

## 2022-08-02 PROCEDURE — 3331090002 HH PPS REVENUE DEBIT

## 2022-08-02 PROCEDURE — G0153 HHCP-SVS OF S/L PATH,EA 15MN: HCPCS

## 2022-08-02 PROCEDURE — G0299 HHS/HOSPICE OF RN EA 15 MIN: HCPCS

## 2022-08-02 PROCEDURE — 3331090001 HH PPS REVENUE CREDIT

## 2022-08-03 ENCOUNTER — PATIENT OUTREACH (OUTPATIENT)
Dept: CASE MANAGEMENT | Age: 87
End: 2022-08-03

## 2022-08-03 VITALS
TEMPERATURE: 97.2 F | RESPIRATION RATE: 17 BRPM | SYSTOLIC BLOOD PRESSURE: 117 MMHG | HEART RATE: 65 BPM | DIASTOLIC BLOOD PRESSURE: 82 MMHG | OXYGEN SATURATION: 96 %

## 2022-08-03 VITALS
HEART RATE: 77 BPM | OXYGEN SATURATION: 97 % | SYSTOLIC BLOOD PRESSURE: 130 MMHG | TEMPERATURE: 97.3 F | RESPIRATION RATE: 18 BRPM | DIASTOLIC BLOOD PRESSURE: 80 MMHG

## 2022-08-03 PROCEDURE — 3331090001 HH PPS REVENUE CREDIT

## 2022-08-03 PROCEDURE — 3331090002 HH PPS REVENUE DEBIT

## 2022-08-03 NOTE — PROGRESS NOTES
Patient resolved from [address] Transitions episode on 8/3/22. Reagan Cases  for son, Minnie Rowley, to call if any further concerns/questions. His mother has not had any further ED/hospitalizations since this admission. Patient/family has been provided the following resources and education related to COVID-19:                         Signs, symptoms and red flags related to COVID-19            CDC exposure and quarantine guidelines            [employer] exposure contact - [phone]            Contact for their local Department of Health                   No further outreach scheduled with this CTN/ACM/LPN/HC/ MA. Episode of Care resolved. Patient has this CTN/ACM/LPN/HC/MA contact information if future needs arise.

## 2022-08-04 PROCEDURE — 3331090001 HH PPS REVENUE CREDIT

## 2022-08-04 PROCEDURE — 3331090002 HH PPS REVENUE DEBIT

## 2022-08-05 ENCOUNTER — HOME CARE VISIT (OUTPATIENT)
Dept: SCHEDULING | Facility: HOME HEALTH | Age: 87
End: 2022-08-05
Payer: MEDICARE

## 2022-08-05 PROCEDURE — 3331090001 HH PPS REVENUE CREDIT

## 2022-08-05 PROCEDURE — 3331090002 HH PPS REVENUE DEBIT

## 2022-08-05 PROCEDURE — G0151 HHCP-SERV OF PT,EA 15 MIN: HCPCS

## 2022-08-06 VITALS
TEMPERATURE: 97.8 F | HEART RATE: 71 BPM | DIASTOLIC BLOOD PRESSURE: 60 MMHG | OXYGEN SATURATION: 99 % | SYSTOLIC BLOOD PRESSURE: 124 MMHG | RESPIRATION RATE: 16 BRPM

## 2022-08-06 PROCEDURE — 3331090002 HH PPS REVENUE DEBIT

## 2022-08-06 PROCEDURE — 3331090001 HH PPS REVENUE CREDIT

## 2022-08-06 PROCEDURE — 3331090003 HH PPS REVENUE ADJ

## 2022-08-07 PROCEDURE — 3331090001 HH PPS REVENUE CREDIT

## 2022-08-07 PROCEDURE — 3331090002 HH PPS REVENUE DEBIT

## 2022-08-08 PROCEDURE — 3331090001 HH PPS REVENUE CREDIT

## 2022-08-08 PROCEDURE — 3331090002 HH PPS REVENUE DEBIT

## 2022-08-09 PROCEDURE — 3331090001 HH PPS REVENUE CREDIT

## 2022-08-09 PROCEDURE — 3331090002 HH PPS REVENUE DEBIT

## 2022-08-10 ENCOUNTER — HOME CARE VISIT (OUTPATIENT)
Dept: HOME HEALTH SERVICES | Facility: HOME HEALTH | Age: 87
End: 2022-08-10
Payer: MEDICARE

## 2022-08-10 VITALS
DIASTOLIC BLOOD PRESSURE: 75 MMHG | RESPIRATION RATE: 18 BRPM | SYSTOLIC BLOOD PRESSURE: 125 MMHG | OXYGEN SATURATION: 98 % | TEMPERATURE: 96.5 F | HEART RATE: 68 BPM

## 2022-08-10 PROCEDURE — 3331090001 HH PPS REVENUE CREDIT

## 2022-08-10 PROCEDURE — G0153 HHCP-SVS OF S/L PATH,EA 15MN: HCPCS

## 2022-08-10 PROCEDURE — 3331090003 HH PPS REVENUE ADJ

## 2022-08-10 PROCEDURE — 3331090002 HH PPS REVENUE DEBIT

## 2022-08-10 PROCEDURE — 400013 HH SOC

## 2022-10-15 ENCOUNTER — HOSPITAL ENCOUNTER (INPATIENT)
Age: 87
LOS: 6 days | Discharge: HOME HEALTH | DRG: 689 | End: 2022-10-21
Attending: EMERGENCY MEDICINE | Admitting: FAMILY MEDICINE
Payer: MEDICARE

## 2022-10-15 ENCOUNTER — APPOINTMENT (OUTPATIENT)
Dept: GENERAL RADIOLOGY | Age: 87
DRG: 689 | End: 2022-10-15
Attending: EMERGENCY MEDICINE
Payer: MEDICARE

## 2022-10-15 DIAGNOSIS — R26.2 AMBULATORY DYSFUNCTION: ICD-10-CM

## 2022-10-15 DIAGNOSIS — E03.9 HYPOTHYROIDISM, UNSPECIFIED TYPE: ICD-10-CM

## 2022-10-15 DIAGNOSIS — N39.0 URINARY TRACT INFECTION WITHOUT HEMATURIA, SITE UNSPECIFIED: Primary | ICD-10-CM

## 2022-10-15 DIAGNOSIS — I63.9 CEREBROVASCULAR ACCIDENT (CVA), UNSPECIFIED MECHANISM (HCC): ICD-10-CM

## 2022-10-15 DIAGNOSIS — R41.0 DISORIENTATION: ICD-10-CM

## 2022-10-15 LAB
ALBUMIN SERPL-MCNC: 3.8 G/DL (ref 3.5–5)
ALBUMIN/GLOB SERPL: 1 {RATIO} (ref 1.1–2.2)
ALP SERPL-CCNC: 80 U/L (ref 45–117)
ALT SERPL-CCNC: 29 U/L (ref 12–78)
ANION GAP SERPL CALC-SCNC: 3 MMOL/L (ref 5–15)
APPEARANCE UR: ABNORMAL
AST SERPL-CCNC: 27 U/L (ref 15–37)
BACTERIA URNS QL MICRO: ABNORMAL /HPF
BASOPHILS # BLD: 0 K/UL (ref 0–0.1)
BASOPHILS NFR BLD: 0 % (ref 0–1)
BILIRUB SERPL-MCNC: 0.4 MG/DL (ref 0.2–1)
BILIRUB UR QL: NEGATIVE
BUN SERPL-MCNC: 34 MG/DL (ref 6–20)
BUN/CREAT SERPL: 25 (ref 12–20)
CALCIUM SERPL-MCNC: 9.6 MG/DL (ref 8.5–10.1)
CHLORIDE SERPL-SCNC: 107 MMOL/L (ref 97–108)
CO2 SERPL-SCNC: 30 MMOL/L (ref 21–32)
COLOR UR: ABNORMAL
COMMENT, HOLDF: NORMAL
CREAT SERPL-MCNC: 1.35 MG/DL (ref 0.55–1.02)
DIFFERENTIAL METHOD BLD: ABNORMAL
EOSINOPHIL # BLD: 0.2 K/UL (ref 0–0.4)
EOSINOPHIL NFR BLD: 2 % (ref 0–7)
EPITH CASTS URNS QL MICRO: ABNORMAL /LPF
ERYTHROCYTE [DISTWIDTH] IN BLOOD BY AUTOMATED COUNT: 13.8 % (ref 11.5–14.5)
GLOBULIN SER CALC-MCNC: 3.9 G/DL (ref 2–4)
GLUCOSE SERPL-MCNC: 123 MG/DL (ref 65–100)
GLUCOSE UR STRIP.AUTO-MCNC: NEGATIVE MG/DL
HCT VFR BLD AUTO: 40.9 % (ref 35–47)
HGB BLD-MCNC: 13.6 G/DL (ref 11.5–16)
HGB UR QL STRIP: ABNORMAL
HYALINE CASTS URNS QL MICRO: ABNORMAL /LPF (ref 0–5)
IMM GRANULOCYTES # BLD AUTO: 0 K/UL (ref 0–0.04)
IMM GRANULOCYTES NFR BLD AUTO: 0 % (ref 0–0.5)
KETONES UR QL STRIP.AUTO: NEGATIVE MG/DL
LEUKOCYTE ESTERASE UR QL STRIP.AUTO: ABNORMAL
LYMPHOCYTES # BLD: 2.5 K/UL (ref 0.8–3.5)
LYMPHOCYTES NFR BLD: 25 % (ref 12–49)
MCH RBC QN AUTO: 34.6 PG (ref 26–34)
MCHC RBC AUTO-ENTMCNC: 33.3 G/DL (ref 30–36.5)
MCV RBC AUTO: 104.1 FL (ref 80–99)
MONOCYTES # BLD: 0.9 K/UL (ref 0–1)
MONOCYTES NFR BLD: 9 % (ref 5–13)
NEUTS SEG # BLD: 6.5 K/UL (ref 1.8–8)
NEUTS SEG NFR BLD: 64 % (ref 32–75)
NITRITE UR QL STRIP.AUTO: NEGATIVE
NRBC # BLD: 0 K/UL (ref 0–0.01)
NRBC BLD-RTO: 0 PER 100 WBC
PH UR STRIP: 5.5 [PH] (ref 5–8)
PLATELET # BLD AUTO: 195 K/UL (ref 150–400)
PMV BLD AUTO: 11.9 FL (ref 8.9–12.9)
POTASSIUM SERPL-SCNC: 4.5 MMOL/L (ref 3.5–5.1)
PROT SERPL-MCNC: 7.7 G/DL (ref 6.4–8.2)
PROT UR STRIP-MCNC: 30 MG/DL
RBC # BLD AUTO: 3.93 M/UL (ref 3.8–5.2)
RBC #/AREA URNS HPF: ABNORMAL /HPF (ref 0–5)
RBC MORPH BLD: ABNORMAL
SAMPLES BEING HELD,HOLD: NORMAL
SODIUM SERPL-SCNC: 140 MMOL/L (ref 136–145)
SP GR UR REFRACTOMETRY: 1.02 (ref 1–1.03)
UR CULT HOLD, URHOLD: NORMAL
UROBILINOGEN UR QL STRIP.AUTO: 0.2 EU/DL (ref 0.2–1)
WBC # BLD AUTO: 10.1 K/UL (ref 3.6–11)
WBC URNS QL MICRO: >100 /HPF (ref 0–4)

## 2022-10-15 PROCEDURE — 65270000029 HC RM PRIVATE

## 2022-10-15 PROCEDURE — 87077 CULTURE AEROBIC IDENTIFY: CPT

## 2022-10-15 PROCEDURE — 99285 EMERGENCY DEPT VISIT HI MDM: CPT

## 2022-10-15 PROCEDURE — 36415 COLL VENOUS BLD VENIPUNCTURE: CPT

## 2022-10-15 PROCEDURE — 74011250636 HC RX REV CODE- 250/636: Performed by: EMERGENCY MEDICINE

## 2022-10-15 PROCEDURE — 80053 COMPREHEN METABOLIC PANEL: CPT

## 2022-10-15 PROCEDURE — 73030 X-RAY EXAM OF SHOULDER: CPT

## 2022-10-15 PROCEDURE — 87086 URINE CULTURE/COLONY COUNT: CPT

## 2022-10-15 PROCEDURE — 71046 X-RAY EXAM CHEST 2 VIEWS: CPT

## 2022-10-15 PROCEDURE — 85025 COMPLETE CBC W/AUTO DIFF WBC: CPT

## 2022-10-15 PROCEDURE — 87186 SC STD MICRODIL/AGAR DIL: CPT

## 2022-10-15 PROCEDURE — 81001 URINALYSIS AUTO W/SCOPE: CPT

## 2022-10-15 RX ORDER — MEMANTINE HYDROCHLORIDE 10 MG/1
10 TABLET ORAL 2 TIMES DAILY
Status: DISCONTINUED | OUTPATIENT
Start: 2022-10-15 | End: 2022-10-21 | Stop reason: HOSPADM

## 2022-10-15 RX ORDER — HALOPERIDOL 0.5 MG/1
0.5 TABLET ORAL
Status: DISCONTINUED | OUTPATIENT
Start: 2022-10-15 | End: 2022-10-21 | Stop reason: HOSPADM

## 2022-10-15 RX ORDER — ASPIRIN 81 MG/1
81 TABLET ORAL DAILY
Status: DISCONTINUED | OUTPATIENT
Start: 2022-10-16 | End: 2022-10-21 | Stop reason: HOSPADM

## 2022-10-15 RX ORDER — LEVOFLOXACIN 5 MG/ML
500 INJECTION, SOLUTION INTRAVENOUS
Status: COMPLETED | OUTPATIENT
Start: 2022-10-15 | End: 2022-10-15

## 2022-10-15 RX ORDER — LEVOTHYROXINE SODIUM 75 UG/1
75 TABLET ORAL
Status: DISCONTINUED | OUTPATIENT
Start: 2022-10-16 | End: 2022-10-17

## 2022-10-15 RX ORDER — DICLOFENAC SODIUM 10 MG/G
2 GEL TOPICAL DAILY
Status: DISCONTINUED | OUTPATIENT
Start: 2022-10-16 | End: 2022-10-21 | Stop reason: HOSPADM

## 2022-10-15 RX ORDER — DONEPEZIL HYDROCHLORIDE 5 MG/1
5 TABLET, FILM COATED ORAL
Status: DISCONTINUED | OUTPATIENT
Start: 2022-10-15 | End: 2022-10-21 | Stop reason: HOSPADM

## 2022-10-15 RX ORDER — ESCITALOPRAM OXALATE 10 MG/1
20 TABLET ORAL DAILY
Status: DISCONTINUED | OUTPATIENT
Start: 2022-10-16 | End: 2022-10-21 | Stop reason: HOSPADM

## 2022-10-15 RX ADMIN — LEVOFLOXACIN 500 MG: 5 INJECTION, SOLUTION INTRAVENOUS at 22:24

## 2022-10-15 RX ADMIN — SODIUM CHLORIDE 1000 ML: 9 INJECTION, SOLUTION INTRAVENOUS at 22:24

## 2022-10-15 NOTE — ED TRIAGE NOTES
TRIAGE NOTE:  Patient arrives by EMS from home with c/o rib pain and shoulder pain. Family reports patient slid out of chair. Family reports patient has not been eating or drinking much the last couple days and has just been laying in bed.

## 2022-10-16 PROCEDURE — 74011000250 HC RX REV CODE- 250: Performed by: STUDENT IN AN ORGANIZED HEALTH CARE EDUCATION/TRAINING PROGRAM

## 2022-10-16 PROCEDURE — 74011250637 HC RX REV CODE- 250/637: Performed by: INTERNAL MEDICINE

## 2022-10-16 PROCEDURE — 74011250636 HC RX REV CODE- 250/636: Performed by: STUDENT IN AN ORGANIZED HEALTH CARE EDUCATION/TRAINING PROGRAM

## 2022-10-16 PROCEDURE — 74011250637 HC RX REV CODE- 250/637: Performed by: STUDENT IN AN ORGANIZED HEALTH CARE EDUCATION/TRAINING PROGRAM

## 2022-10-16 PROCEDURE — 65270000029 HC RM PRIVATE

## 2022-10-16 RX ORDER — POLYETHYLENE GLYCOL 3350 17 G/17G
17 POWDER, FOR SOLUTION ORAL DAILY PRN
Status: DISCONTINUED | OUTPATIENT
Start: 2022-10-16 | End: 2022-10-21 | Stop reason: HOSPADM

## 2022-10-16 RX ORDER — EZETIMIBE 10 MG/1
10 TABLET ORAL DAILY
Status: DISCONTINUED | OUTPATIENT
Start: 2022-10-17 | End: 2022-10-21 | Stop reason: HOSPADM

## 2022-10-16 RX ORDER — SODIUM CHLORIDE 0.9 % (FLUSH) 0.9 %
5-40 SYRINGE (ML) INJECTION AS NEEDED
Status: DISCONTINUED | OUTPATIENT
Start: 2022-10-16 | End: 2022-10-21 | Stop reason: HOSPADM

## 2022-10-16 RX ORDER — ONDANSETRON 2 MG/ML
4 INJECTION INTRAMUSCULAR; INTRAVENOUS
Status: DISCONTINUED | OUTPATIENT
Start: 2022-10-16 | End: 2022-10-21 | Stop reason: HOSPADM

## 2022-10-16 RX ORDER — LEVOFLOXACIN 250 MG/1
250 TABLET ORAL EVERY 24 HOURS
Status: DISCONTINUED | OUTPATIENT
Start: 2022-10-16 | End: 2022-10-21 | Stop reason: HOSPADM

## 2022-10-16 RX ORDER — SODIUM CHLORIDE 9 MG/ML
75 INJECTION, SOLUTION INTRAVENOUS CONTINUOUS
Status: DISCONTINUED | OUTPATIENT
Start: 2022-10-16 | End: 2022-10-16

## 2022-10-16 RX ORDER — HYDRALAZINE HYDROCHLORIDE 20 MG/ML
10 INJECTION INTRAMUSCULAR; INTRAVENOUS
Status: DISCONTINUED | OUTPATIENT
Start: 2022-10-16 | End: 2022-10-21 | Stop reason: HOSPADM

## 2022-10-16 RX ORDER — ONDANSETRON 4 MG/1
4 TABLET, ORALLY DISINTEGRATING ORAL
Status: DISCONTINUED | OUTPATIENT
Start: 2022-10-16 | End: 2022-10-21 | Stop reason: HOSPADM

## 2022-10-16 RX ORDER — ENOXAPARIN SODIUM 100 MG/ML
30 INJECTION SUBCUTANEOUS DAILY
Status: DISCONTINUED | OUTPATIENT
Start: 2022-10-16 | End: 2022-10-21 | Stop reason: HOSPADM

## 2022-10-16 RX ORDER — SODIUM CHLORIDE 0.9 % (FLUSH) 0.9 %
5-40 SYRINGE (ML) INJECTION EVERY 8 HOURS
Status: DISCONTINUED | OUTPATIENT
Start: 2022-10-16 | End: 2022-10-21 | Stop reason: HOSPADM

## 2022-10-16 RX ORDER — SODIUM CHLORIDE 9 MG/ML
75 INJECTION, SOLUTION INTRAVENOUS CONTINUOUS
Status: DISCONTINUED | OUTPATIENT
Start: 2022-10-16 | End: 2022-10-20

## 2022-10-16 RX ADMIN — DONEPEZIL HYDROCHLORIDE 5 MG: 5 TABLET, FILM COATED ORAL at 00:18

## 2022-10-16 RX ADMIN — LEVOTHYROXINE SODIUM 75 MCG: 0.07 TABLET ORAL at 13:45

## 2022-10-16 RX ADMIN — SODIUM CHLORIDE, PRESERVATIVE FREE 10 ML: 5 INJECTION INTRAVENOUS at 22:17

## 2022-10-16 RX ADMIN — HALOPERIDOL 0.5 MG: 0.5 TABLET ORAL at 00:18

## 2022-10-16 RX ADMIN — ESCITALOPRAM OXALATE 20 MG: 10 TABLET ORAL at 13:45

## 2022-10-16 RX ADMIN — MEMANTINE HYDROCHLORIDE 10 MG: 10 TABLET ORAL at 00:18

## 2022-10-16 RX ADMIN — SODIUM CHLORIDE 75 ML/HR: 9 INJECTION, SOLUTION INTRAVENOUS at 08:17

## 2022-10-16 RX ADMIN — MEMANTINE HYDROCHLORIDE 10 MG: 10 TABLET ORAL at 22:17

## 2022-10-16 RX ADMIN — DONEPEZIL HYDROCHLORIDE 5 MG: 5 TABLET, FILM COATED ORAL at 22:17

## 2022-10-16 RX ADMIN — MEMANTINE HYDROCHLORIDE 10 MG: 10 TABLET ORAL at 13:45

## 2022-10-16 RX ADMIN — ASPIRIN 81 MG: 81 TABLET, COATED ORAL at 13:45

## 2022-10-16 RX ADMIN — LEVOFLOXACIN 250 MG: 250 TABLET, FILM COATED ORAL at 19:38

## 2022-10-16 RX ADMIN — ENOXAPARIN SODIUM 30 MG: 100 INJECTION SUBCUTANEOUS at 13:45

## 2022-10-16 NOTE — PROGRESS NOTES
Attempted therapy again, patient dtr in room and gave home situation and background, entered into chart for next time eval attempted. Pt unrousable and dtr requests defer at this time.     Peewee Tyler, DPT, PT

## 2022-10-16 NOTE — PROGRESS NOTES
Day #1 of Levofloxacin  Indication:  UTI  Current regimen:  250mg Q24H  Abx regimen: levofloxacin  Recent Labs     10/15/22  1806   WBC 10.1   CREA 1.35*   BUN 34*     Est CrCl: 25 ml/min; UO: --- ml/kg/hr  Temp (24hrs), Av.8 °F (36.6 °C), Min:97.6 °F (36.4 °C), Max:98 °F (36.7 °C)    Cultures: none    Plan: Continue current regimen

## 2022-10-16 NOTE — ED NOTES
Bedside, Verbal, and Written shift change report given to Gerda Barakat RN (oncoming nurse) by Eriberto Aguirre (offgoing nurse). Report included the following information SBAR, Kardex, and ED Summary.

## 2022-10-16 NOTE — PROGRESS NOTES
Lovenox Monitoring  Indication: DVT Prophylaxis  Recent Labs     10/15/22  1806   HGB 13.6      CREA 1.35*     Current Weight: 58.4 kg  Est. CrCl = 25.8 ml/min  Current Dose: 40 mg subcutaneously every 24 hours.   Plan: Change to 30 mg sq q 24 h

## 2022-10-16 NOTE — ED NOTES
TRANSFER - OUT REPORT:    Verbal report given to Brigette Fong RN(name) on Caridad Pandya  being transferred to [address] [place] [address](unit) for routine progression of care       Report consisted of patients Situation, Background, Assessment and   Recommendations(SBAR). Information from the following report(s) SBAR, ED Summary and MAR was reviewed with the receiving nurse. Lines:   Peripheral IV 10/15/22 Right Antecubital (Active)        Opportunity for questions and clarification was provided.       Patient transported with:   Transporter

## 2022-10-16 NOTE — PALLIATIVE CARE DISCHARGE
Pt asleep in the room, poorly rousable, transferring to the floor per RN. Will see patient as able once transferred.      Kai Adame, RUSST, PT

## 2022-10-16 NOTE — PROGRESS NOTES
[facility]  Hospitalist Group                                                                                          Hospitalist Progress Note  Morgan Gallardo MD  Answering service: [phone] from in house phone        Date of Service:  10/16/2022  NAME:  Leslie Dong  :  1935  MRN:  [id number]      Admission Summary:   Excerpt of HPI: \"--Mary Merrill is a 81 y.o. female `with hx of htn, hypothyroidism, breast cancer, cva, ckd iii-iv, dementia who presents to hospital for ams. Family at bedside states patient has dementia more stable/known baseline. She was noted to be increasingly confused over the last 4 days. Family bedside states this typically occurs with urinary tract infections. She was brought to ED to evaluate for acute cystitis. Patient is seen and examined at bedside. She is conversant and has no acute complaints or concerns. The patient denies any fever, chills, chest or abdominal pain, nausea, vomiting, cough, congestion, recent illness, palpitations, or dysuria. --\"       Interval history / Subjective:   Seen earlier, son at bedside, neurology consult ordered, son indicates she had haldol around the time therapy report being unarousable (the family member was present then), current awake and oriented to self and recognizes family member     Assessment & Plan:      Altered mental status, delirium  -in setting of dementia history (son reports alzheimers)  -in setting of recurrent uti, on abx  -in setting of hx of     UTI, acute cystitis by UA  -hx of enterococcus uti sensitive to levaquin  -followup on culture report     SHERINE, in setting of poor po intake  -ivf   -trend with intermittent labs    Hx of CVA, intolerant to statins in the past?  -on aspirin  -start zetia 10/16/22  -check lipid panel     dementia and delirium  -has been on prn haldol prior to admission  -resume home meds  -redirect as needed  -supportive measures  -monitor    Hx of depression  -does not voice si/hi/hallucinations  -resume med    Mild bradycardia  -resume telemetry, may need to hold aricept pending trend     Hx of hypothyroidism  -on replacement  -check tsh level     See other orders for plan       Code status: full as per admission   Prophylaxis: lovenox  Care Plan discussed with: patient/son  Anticipated Disposition: tbd      Pending neurology eval, followup on urine culture     Hospital Problems  Date Reviewed: 7/4/2022            Codes Class Noted POA    Ambulatory dysfunction ICD-10-CM: R26.2  ICD-9-CM: 719.7  10/15/2022 Unknown        UTI (urinary tract infection) ICD-10-CM: N39.0  ICD-9-CM: 599.0  7/19/2018 Unknown             Review of Systems:   A comprehensive review of systems was negative except for that written in the HPI. Vital Signs:    Last 24hrs VS reviewed since prior progress note. Most recent are:  Visit Vitals  /64 (BP 1 Location: Left upper arm, BP Patient Position: At rest)   Pulse (!) 57   Temp 98 °F (36.7 °C)   Resp 16   Wt 58.4 kg (128 lb 12.4 oz)   SpO2 95%   BMI 22.10 kg/m²       No intake or output data in the 24 hours ending 10/16/22 1546     Physical Examination:     I had a face to face encounter with this patient and independently examined them on 10/16/2022 as outlined below:          Constitutional:    Eyes:  No acute distress, cooperative, pleasant       Anicteric, no injection   ENT:  Oral mucosa moist, oropharynx benign. Pulm:    Chest:  CTA bilaterally. No wheezing/rhonchi/rales. No accessory muscle use. Nonlabored breathing, nontender   CV:  Regular rhythm, normal rate, no rubs    GI:  Soft, non distended, non tender. normoactive bowel sounds   Musculoskeletal:    Skin:  No edema, warm, 2+ pulses throughout      No jaundice, no cyanosis, dry skin    Neurologic:  Moves all extremities.   Awake, oriented to person       Psych: not agitated, not anxious     Data Review:    Review and/or order of clinical lab test      Labs: Recent Labs     10/15/22  1806   WBC 10.1   HGB 13.6   HCT 40.9        Recent Labs     10/15/22  1806      K 4.5      CO2 30   BUN 34*   CREA 1.35*   *   CA 9.6     Recent Labs     10/15/22  1806   ALT 29   AP 80   TBILI 0.4   TP 7.7   ALB 3.8   GLOB 3.9       Lab Results   Component Value Date/Time    Cholesterol, total 252 (H) 01/19/2020 03:56 AM    HDL Cholesterol 30 01/19/2020 03:56 AM    LDL, calculated 169 (H) 01/19/2020 03:56 AM    Triglyceride 265 (H) 01/19/2020 03:56 AM    CHOL/HDL Ratio 8.4 (H) 01/19/2020 03:56 AM     No results found for: [facility]  Lab Results   Component Value Date/Time    Color YELLOW/STRAW 10/15/2022 06:55 PM    Appearance TURBID (A) 10/15/2022 06:55 PM    Specific gravity 1.016 10/15/2022 06:55 PM    Specific gravity 1.015 01/18/2020 11:13 AM    pH (UA) 5.5 10/15/2022 06:55 PM    Protein 30 (A) 10/15/2022 06:55 PM    Glucose Negative 10/15/2022 06:55 PM    Ketone Negative 10/15/2022 06:55 PM    Bilirubin Negative 10/15/2022 06:55 PM    Urobilinogen 0.2 10/15/2022 06:55 PM    Nitrites Negative 10/15/2022 06:55 PM    Leukocyte Esterase LARGE (A) 10/15/2022 06:55 PM    Epithelial cells FEW 10/15/2022 06:55 PM    Bacteria 3+ (A) 10/15/2022 06:55 PM    WBC >100 (H) 10/15/2022 06:55 PM    RBC 10-20 10/15/2022 06:55 PM         Medications Reviewed:     Current Facility-Administered Medications   Medication Dose Route Frequency    sodium chloride (NS) flush 5-40 mL  5-40 mL IntraVENous Q8H    sodium chloride (NS) flush 5-40 mL  5-40 mL IntraVENous PRN    polyethylene glycol (MIRALAX) packet 17 g  17 g Oral DAILY PRN    ondansetron (ZOFRAN ODT) tablet 4 mg  4 mg Oral Q8H PRN    Or    ondansetron (ZOFRAN) injection 4 mg  4 mg IntraVENous Q6H PRN    enoxaparin (LOVENOX) injection 30 mg  30 mg SubCUTAneous DAILY    0.9% sodium chloride infusion  50 mL/hr IntraVENous CONTINUOUS    levoFLOXacin (LEVAQUIN) tablet 250 mg  250 mg Oral Q24H    hydrALAZINE (APRESOLINE) 20 mg/mL injection 10 mg  10 mg IntraVENous Q6H PRN    [START ON 10/17/2022] L.acidophilus-paracasei-S.thermophil-bifidobacter (RISAQUAD) 8 billion cell capsule  1 Capsule Oral DAILY    [START ON 10/17/2022] ezetimibe (ZETIA) tablet 10 mg  10 mg Oral DAILY    aspirin delayed-release tablet 81 mg  81 mg Oral DAILY    diclofenac (VOLTAREN) 1 % topical gel 2 g  2 g Topical DAILY    donepeziL (ARICEPT) tablet 5 mg  5 mg Oral QHS    escitalopram oxalate (LEXAPRO) tablet 20 mg  20 mg Oral DAILY    haloperidoL (HALDOL) tablet 0.5 mg  0.5 mg Oral QHS PRN    levothyroxine (SYNTHROID) tablet 75 mcg  75 mcg Oral ACB    memantine (NAMENDA) tablet 10 mg  10 mg Oral BID     ______________________________________________________________________  EXPECTED LENGTH OF STAY: - - -  ACTUAL LENGTH OF STAY:          1                 Katheryn Lora MD

## 2022-10-16 NOTE — H&P
History & Physical    Primary Care Provider: Princess Malena MD  Source of Information: Patient and chart review    History of Presenting Illness:   Leslie Dong is a 81 y.o. female `with hx of htn, hypothyroidism, breast cancer, cva, ckd iii-iv, dementia who presents to hospital for ams. Family at bedside states patient has dementia more stable/known baseline. She was noted to be increasingly confused over the last 4 days. Family bedside states this typically occurs with urinary tract infections. She was brought to ED to evaluate for acute cystitis. Patient is seen and examined at bedside. She is conversant and has no acute complaints or concerns. The patient denies any fever, chills, chest or abdominal pain, nausea, vomiting, cough, congestion, recent illness, palpitations, or dysuria. Remarkable vitals on ER Presentations: vss  Labs Remarkable for: ua w/ 3+ bact, LEs, >100wbc,   ER Images: cxr et xr right shoulder: no acute process. ER rx: levaquin, 1L ns bolus     Review of Systems:  A comprehensive review of systems was negative except for that written in the History of Present Illness. Past Medical History:   Diagnosis Date    Breast CA ([address]     Fluid retention in legs     Hypertension     Hypothyroidism       Past Surgical History:   Procedure Laterality Date    HX ORTHOPAEDIC  2007    left hip replacement    [place] BREAST SURGERY PROCEDURE UNLISTED      Left lumpectomy     Prior to Admission medications    Medication Sig Start Date End Date Taking? Authorizing Provider   diclofenac (VOLTAREN) 1 % gel Apply 2 g to affected area daily. to knees for arthritic pain 1/12/21   Provider, Historical   haloperidoL (HALDOL) 0.5 mg tablet Take 0.5 mg by mouth every other day. 12/6/20   Provider, Historical   aspirin delayed-release 81 mg tablet Take 81 mg by mouth daily.  1/22/20   Provider, Historical   acetaminophen (TYLENOL) 500 mg tablet Take 500 mg by mouth every six (6) hours as needed for Pain. 1/22/20   Provider, Historical   melatonin 3 mg tablet Take 1 Tab by mouth nightly as needed (sleep). Patient not taking: Reported on 1/6/2021 1/22/20   Cyndi Ball MD   donepeziL (ARICEPT) 5 mg tablet Take 5 mg by mouth nightly. Provider, Historical   memantine ER (NAMENDA XR) 28 mg capsule Take 28 mg by mouth daily. Provider, Historical   cranberry 500 mg capsule Take 500 mg by mouth daily. 7/31/18   Provider, Historical   multivit-min/iron/folic/lutein (CENTRUM SILVER WOMEN PO) Take 1,500 mg by mouth daily. Radha Leigh MD   escitalopram oxalate (LEXAPRO) 20 mg tablet Take 20 mg by mouth daily. Provider, Historical   levothyroxine (SYNTHROID) 100 mcg tablet Take 75 mcg by mouth six (6) days a week. Take 75 mcg by mouth Monday through Saturday. Take half-tablet (50 mcg) by mouth on Sunday 2/12/20   Provider, Historical     Allergies   Allergen Reactions    Sulfamethoxazole-Trimethoprim Hives     Treated with diphenhydramine    Statins-Hmg-Coa Reductase Inhibitors Unknown (comments)     Leg cramps/weakness    Darvocet A500 [Propoxyphene N-Acetaminophen] Itching    Percocet [Oxycodone-Acetaminophen] Itching    Rocephin [Ceftriaxone] Swelling     Questionable allergy, had facial swelling morning after IV administration      No family history on file. SOCIAL HISTORY:  Patient resides:  Independently x   Assisted Living    SNF    With family care x      Smoking history:   None x   Former    Chronic      Alcohol history:   None x   Social    Chronic      Ambulates:   Independently x   w/cane    w/walker    w/wc    CODE STATUS:  DNR    Full xx   Other      Objective:     Physical Exam:     Visit Vitals  BP (!) 164/76   Pulse 68   Temp 97.6 °F (36.4 °C)   Resp 17   SpO2 95%      O2 Device: None (Room air)    General:  Alert, cooperative, no distress, appears stated age. Head:  Normocephalic, without obvious abnormality, atraumatic. Eyes:  Conjunctivae/corneas clear. PERRL, EOMs intact. Nose: Nares normal. Septum midline. Mucosa normal.        Neck: Supple, symmetrical, trachea midline,       Lungs:   Clear to auscultation bilaterally. Chest wall:  No tenderness or deformity. Heart:  Regular rate and rhythm, S1, S2 normal   Abdomen:   Soft, non-tender. Bowel sounds normal. No masses,  No organomegaly. Extremities: Extremities normal, atraumatic, no cyanosis or edema. Pulses: 2+ and symmetric all extremities. Skin: Skin color, texture, turgor normal. No rashes or lesions   Neurologic: CNII-XII intact. Data Review:     Recent Days:  Recent Labs     10/15/22  1806   WBC 10.1   HGB 13.6   HCT 40.9        Recent Labs     10/15/22  1806      K 4.5      CO2 30   *   BUN 34*   CREA 1.35*   CA 9.6   ALB 3.8   ALT 29     No results for input(s): PH, PCO2, PO2, HCO3, FIO2 in the last 72 hours. 24 Hour Results:  Recent Results (from the past 24 hour(s))   CBC WITH AUTOMATED DIFF    Collection Time: 10/15/22  6:06 PM   Result Value Ref Range    WBC 10.1 3.6 - 11.0 K/uL    RBC 3.93 3.80 - 5.20 M/uL    HGB 13.6 11.5 - 16.0 g/dL    HCT 40.9 35.0 - 47.0 %    .1 (H) 80.0 - 99.0 FL    MCH 34.6 (H) 26.0 - 34.0 PG    MCHC 33.3 30.0 - 36.5 g/dL    RDW 13.8 11.5 - 14.5 %    PLATELET 347 667 - 592 K/uL    MPV 11.9 8.9 - 12.9 FL    NRBC 0.0 0  WBC    ABSOLUTE NRBC 0.00 0.00 - 0.01 K/uL    NEUTROPHILS 64 32 - 75 %    LYMPHOCYTES 25 12 - 49 %    MONOCYTES 9 5 - 13 %    EOSINOPHILS 2 0 - 7 %    BASOPHILS 0 0 - 1 %    IMMATURE GRANULOCYTES 0 0.0 - 0.5 %    ABS. NEUTROPHILS 6.5 1.8 - 8.0 K/UL    ABS. LYMPHOCYTES 2.5 0.8 - 3.5 K/UL    ABS. MONOCYTES 0.9 0.0 - 1.0 K/UL    ABS. EOSINOPHILS 0.2 0.0 - 0.4 K/UL    ABS. BASOPHILS 0.0 0.0 - 0.1 K/UL    ABS. IMM.  GRANS. 0.0 0.00 - 0.04 K/UL    DF SMEAR SCANNED      RBC COMMENTS MACROCYTOSIS  1+       METABOLIC PANEL, COMPREHENSIVE    Collection Time: 10/15/22  6:06 PM   Result Value Ref Range    Sodium 140 136 - 145 mmol/L    Potassium 4.5 3.5 - 5.1 mmol/L    Chloride 107 97 - 108 mmol/L    CO2 30 21 - 32 mmol/L    Anion gap 3 (L) 5 - 15 mmol/L    Glucose 123 (H) 65 - 100 mg/dL    BUN 34 (H) 6 - 20 MG/DL    Creatinine 1.35 (H) 0.55 - 1.02 MG/DL    BUN/Creatinine ratio 25 (H) 12 - 20      eGFR 38 (L) >60 ml/min/1.73m2    Calcium 9.6 8.5 - 10.1 MG/DL    Bilirubin, total 0.4 0.2 - 1.0 MG/DL    ALT (SGPT) 29 12 - 78 U/L    AST (SGOT) 27 15 - 37 U/L    Alk. phosphatase 80 45 - 117 U/L    Protein, total 7.7 6.4 - 8.2 g/dL    Albumin 3.8 3.5 - 5.0 g/dL    Globulin 3.9 2.0 - 4.0 g/dL    A-G Ratio 1.0 (L) 1.1 - 2.2     SAMPLES BEING HELD    Collection Time: 10/15/22  6:06 PM   Result Value Ref Range    SAMPLES BEING HELD 1 RED     COMMENT        Add-on orders for these samples will be processed based on acceptable specimen integrity and analyte stability, which may vary by analyte. URINALYSIS W/MICROSCOPIC    Collection Time: 10/15/22  6:55 PM   Result Value Ref Range    Color YELLOW/STRAW      Appearance TURBID (A) CLEAR      Specific gravity 1.016 1.003 - 1.030      pH (UA) 5.5 5.0 - 8.0      Protein 30 (A) NEG mg/dL    Glucose Negative NEG mg/dL    Ketone Negative NEG mg/dL    Bilirubin Negative NEG      Blood MODERATE (A) NEG      Urobilinogen 0.2 0.2 - 1.0 EU/dL    Nitrites Negative NEG      Leukocyte Esterase LARGE (A) NEG      WBC >100 (H) 0 - 4 /hpf    RBC 10-20 0 - 5 /hpf    Epithelial cells FEW FEW /lpf    Bacteria 3+ (A) NEG /hpf    Hyaline cast 2-5 0 - 5 /lpf   URINE CULTURE HOLD SAMPLE    Collection Time: 10/15/22  6:55 PM    Specimen: Serum; Urine   Result Value Ref Range    Urine culture hold        Urine on hold in Microbiology dept for 2 days. If unpreserved urine is submitted, it cannot be used for addtional testing after 24 hours, recollection will be required.          Imaging:     Assessment:     Justo Garcia is a 81 y.o. female `with hx of htn, hypothyroidism, breast cancer, cva, ckd iii-iv, dementia who is admitted for acute cystitis with met encephalopathy.        Plan:       Acute Cystitis / Metabolic Encephalopathy  -Continue Levaquin given allergy profile  -Monitor mentation for improvement  -Follow urine cultures  -Gentle IV hydration  -Encourage p.o. intake    H/o CVA  - Continue ASA, statin      H/o Dementia - home namenda, haldol, aricept   Depression - lexapro      Hypothyroidism  -Continue home Synthroid          FEN/GI -  [email or web address]  Activity - as tolerated  DVT prophylaxis - Lovenox  GI prophylaxis -  NI  Disposition - Home    CODE STATUS:  full code       Signed By: Coleman Mcneal MD     October 15, 2022

## 2022-10-16 NOTE — ED PROVIDER NOTES
20-year-old woman from home with a history of dementia presents for a ground-level fall. Patient was complaining of some right-sided rib pains. Presents with her sons who states she is not anticoagulated but does take an aspirin a day. Says she is been acting a bit more confused than usual which tends to happen when she has a urinary tract infection. Patient is pleasantly demented and has no complaints. ROS:  Constitutional: Negative for chills or fever. Eyes: Negative for vision change or loss. ENT and mouth: Negative for ear drainage, epistaxis, or mouth sores. Cardiovascular: Negative for chest pain. Respiratory: No wheezing or shortness of breath. Gastrointestinal: No melena or BRB per rectum. Musculoskeletal: No loss of range of motion. Positive for right-sided rib cage pain. Neurologic: No unilateral weakness. Integumentary: No rash. Psychiatric: Negative for SI.    10 level review of systems is otherwise negative except as noted above in the ROS and in the history of present illness. Reviewed and agree with available nursing notes. Available prior ED visit history reviewed. Vital signs were reviewed and oxygenation is adequate. Physical exam:  Constitutional: Awake, alert, not in severe distress. Head and neck: Normocephalic, atraumatic. No JVD, no nuchal rigidity. ENT and mouth: No active epistaxis, external ears normal, trachea is midline. Eyes: Extraocular movements intact, no periorbital edema. Cardiovascular: Regular rate, regular rhythm. Respiratory: No increased work of breathing, no signs of pending respiratory failure. To auscultation bilaterally. Gastrointestinal: Nondistended. Musculoskeletal: Free range of motion, no edema. Integumentary: Warm and dry, no rash, skin is intact. Neurologic: Normal speech, no lateralizing neurologic deficit. Psychiatric: Demented, not responding to internal stimuli.     Differential diagnosis includes but is not limited to: Fracture, dislocation, sprain, strain, contusion, laceration, abrasion, cellulitis, soft tissue foreign body, tendon injury, arterial occlusion, DVT, head injury, intracranial injury, cervical spine injury, paresthesia, neuropathy. UTI, pyelonephritis, severe sepsis, septic shock, sepsis, SIRS, pneumonia, bronchitis, COVID-19, cellulitis, meningitis, encephalitis, tickborne illness, intra-abdominal infection, PID, postoperative infection, acute kidney injury, dehydration, infectious diarrhea, C. difficile, colitis. Medical decision making and ED course: Patient with UTI, she was given IV fluid hydration and IV antibiotics, imaging studies are negative, her physical exam findings are benign. Unfortunately between the patient's dementia, generalized weakness, family reported she not eating or drinking, she cannot stand because she cannot get to a seated position on her own and she is living independently in the community with no home health on the weekends. I consulted the hospitalist for admission and she very well may need a rehab facility placement. Fall    Fatigue       Past Medical History:   Diagnosis Date    Breast CA ([facility] [address]     Fluid retention in legs     Hypertension     Hypothyroidism        Past Surgical History:   Procedure Laterality Date    HX ORTHOPAEDIC  2007    left hip replacement    [place] BREAST SURGERY PROCEDURE UNLISTED      Left lumpectomy         No family history on file. Social History     Socioeconomic History    Marital status:      Spouse name: Not on file    Number of children: Not on file    Years of education: Not on file    Highest education level: Not on file   Occupational History    Not on file   Tobacco Use    Smoking status: Every Day    Smokeless tobacco: Never   Substance and Sexual Activity    Alcohol use:  Yes     Alcohol/week: 0.0 standard drinks    Drug use: No    Sexual activity: Never   Other Topics Concern    Not on file   Social History Narrative    Not on file     Social Determinants of Health     Financial Resource Strain: Not on file   Food Insecurity: Not on file   Transportation Needs: Not on file   Physical Activity: Not on file   Stress: Not on file   Social Connections: Not on file   Intimate Partner Violence: Not on file   Housing Stability: Not on file         ALLERGIES: Sulfamethoxazole-trimethoprim, Statins-hmg-coa reductase inhibitors, Darvocet a500 [propoxyphene n-acetaminophen], Percocet [oxycodone-acetaminophen], and Rocephin [ceftriaxone]    Review of Systems   Constitutional:  Positive for fatigue. Vitals:    10/15/22 1512 10/15/22 2012   BP: 133/60 (!) 164/76   Pulse: 67 68   Resp: 18 17   Temp: 97.6 °F (36.4 °C)    SpO2: 97% 95%            Physical Exam     MDM     Perfect Serve Consult for Admission  9:28 PM    ED Room Number: ER06/06  Patient Name and age:  Caridad Pandya 81 y.o.  female  Working Diagnosis:   1. Urinary tract infection without hematuria, site unspecified    2. Ambulatory dysfunction        COVID-19 Suspicion:  no  Sepsis present:  no  Reassessment needed: no  Code Status:  Do Not Resuscitate  Readmission: no  Isolation Requirements:  no  Recommended Level of Care:  med/surg  Department:[facility] ED - 21   Other:  Social admit, 80yo woman with dementia, UTI, ground-level fall, unable to walk or even sit up under her own power. Family says not eating or drinking, lives independently in the community, no help on the weekends from home health or other services.     Procedures

## 2022-10-17 LAB
ALBUMIN SERPL-MCNC: 3 G/DL (ref 3.5–5)
ANION GAP SERPL CALC-SCNC: 6 MMOL/L (ref 5–15)
BUN SERPL-MCNC: 30 MG/DL (ref 6–20)
BUN/CREAT SERPL: 23 (ref 12–20)
CALCIUM SERPL-MCNC: 8.8 MG/DL (ref 8.5–10.1)
CHLORIDE SERPL-SCNC: 113 MMOL/L (ref 97–108)
CHOLEST SERPL-MCNC: 138 MG/DL
CO2 SERPL-SCNC: 24 MMOL/L (ref 21–32)
CREAT SERPL-MCNC: 1.32 MG/DL (ref 0.55–1.02)
GLUCOSE SERPL-MCNC: 78 MG/DL (ref 65–100)
HDLC SERPL-MCNC: 32 MG/DL
HDLC SERPL: 4.3 {RATIO} (ref 0–5)
LDLC SERPL CALC-MCNC: 79.8 MG/DL (ref 0–100)
PHOSPHATE SERPL-MCNC: 2.8 MG/DL (ref 2.6–4.7)
POTASSIUM SERPL-SCNC: 4 MMOL/L (ref 3.5–5.1)
SODIUM SERPL-SCNC: 143 MMOL/L (ref 136–145)
TRIGL SERPL-MCNC: 131 MG/DL (ref ?–150)
TSH SERPL DL<=0.05 MIU/L-ACNC: 5.21 UIU/ML (ref 0.36–3.74)
VLDLC SERPL CALC-MCNC: 26.2 MG/DL

## 2022-10-17 PROCEDURE — 84443 ASSAY THYROID STIM HORMONE: CPT

## 2022-10-17 PROCEDURE — 74011250637 HC RX REV CODE- 250/637: Performed by: INTERNAL MEDICINE

## 2022-10-17 PROCEDURE — 80069 RENAL FUNCTION PANEL: CPT

## 2022-10-17 PROCEDURE — 74011250637 HC RX REV CODE- 250/637: Performed by: STUDENT IN AN ORGANIZED HEALTH CARE EDUCATION/TRAINING PROGRAM

## 2022-10-17 PROCEDURE — 97161 PT EVAL LOW COMPLEX 20 MIN: CPT

## 2022-10-17 PROCEDURE — 36415 COLL VENOUS BLD VENIPUNCTURE: CPT

## 2022-10-17 PROCEDURE — 65270000029 HC RM PRIVATE

## 2022-10-17 PROCEDURE — 74011000250 HC RX REV CODE- 250: Performed by: STUDENT IN AN ORGANIZED HEALTH CARE EDUCATION/TRAINING PROGRAM

## 2022-10-17 PROCEDURE — 74011250636 HC RX REV CODE- 250/636: Performed by: INTERNAL MEDICINE

## 2022-10-17 PROCEDURE — 74011250636 HC RX REV CODE- 250/636: Performed by: STUDENT IN AN ORGANIZED HEALTH CARE EDUCATION/TRAINING PROGRAM

## 2022-10-17 PROCEDURE — 97165 OT EVAL LOW COMPLEX 30 MIN: CPT

## 2022-10-17 PROCEDURE — 99223 1ST HOSP IP/OBS HIGH 75: CPT | Performed by: NURSE PRACTITIONER

## 2022-10-17 PROCEDURE — 97535 SELF CARE MNGMENT TRAINING: CPT

## 2022-10-17 PROCEDURE — 80061 LIPID PANEL: CPT

## 2022-10-17 PROCEDURE — 97116 GAIT TRAINING THERAPY: CPT

## 2022-10-17 RX ORDER — LEVOTHYROXINE SODIUM 88 UG/1
88 TABLET ORAL
Status: DISCONTINUED | OUTPATIENT
Start: 2022-10-17 | End: 2022-10-21 | Stop reason: HOSPADM

## 2022-10-17 RX ADMIN — MEMANTINE HYDROCHLORIDE 10 MG: 10 TABLET ORAL at 08:23

## 2022-10-17 RX ADMIN — MEMANTINE HYDROCHLORIDE 10 MG: 10 TABLET ORAL at 22:43

## 2022-10-17 RX ADMIN — DONEPEZIL HYDROCHLORIDE 5 MG: 5 TABLET, FILM COATED ORAL at 22:43

## 2022-10-17 RX ADMIN — SODIUM CHLORIDE 50 ML/HR: 9 INJECTION, SOLUTION INTRAVENOUS at 08:32

## 2022-10-17 RX ADMIN — LEVOFLOXACIN 250 MG: 250 TABLET, FILM COATED ORAL at 15:48

## 2022-10-17 RX ADMIN — ASPIRIN 81 MG: 81 TABLET, COATED ORAL at 09:08

## 2022-10-17 RX ADMIN — ENOXAPARIN SODIUM 30 MG: 100 INJECTION SUBCUTANEOUS at 09:08

## 2022-10-17 RX ADMIN — Medication 1 CAPSULE: at 09:08

## 2022-10-17 RX ADMIN — SODIUM CHLORIDE, PRESERVATIVE FREE 10 ML: 5 INJECTION INTRAVENOUS at 22:43

## 2022-10-17 RX ADMIN — EZETIMIBE 10 MG: 10 TABLET ORAL at 09:08

## 2022-10-17 RX ADMIN — LEVOTHYROXINE SODIUM 88 MCG: 0.09 TABLET ORAL at 09:01

## 2022-10-17 RX ADMIN — ESCITALOPRAM OXALATE 20 MG: 10 TABLET ORAL at 09:08

## 2022-10-17 NOTE — PROGRESS NOTES
FUNCTIONAL STATUS PRIOR TO ADMISSION: Patient was modified independent using a rolling walker for functional mobility. HOME SUPPORT PRIOR TO ADMISSION:  Pt lived with spouse who also has dementia - has local support from daughter - has paid caregivers - 4 hrs in am and 3 hrs in pm.    Physical Therapy Goals  Initiated 10/17/2022  1. Patient will move from supine to sit and sit to supine  and scoot up and down in bed with modified independence within 7 day(s). 2.  Patient will transfer from bed to chair and chair to bed with modified independence using the least restrictive device within 7 day(s). 3.  Patient will perform sit to stand with modified independence within 7 day(s). 4.  Patient will ambulate with modified independence for > 150 feet with the least restrictive device within 7 day(s). 5.  Patient will ascend/descend 4 stairs with one handrail(s) with supervision/set-up within 7 day(s). PHYSICAL THERAPY EVALUATION  Patient: Calvin Deleon (62 y.o. female)  Date: 10/17/2022  Primary Diagnosis: UTI (urinary tract infection) [N39.0]  Ambulatory dysfunction [R26.2]       Precautions:   Fall    ASSESSMENT  Based on the objective data described below, the patient presents with baseline dementia, generalized weakness, impaired standing balance/gait with RW and fall risk. Pt appears to be at baseline function, + fall risk and will benefit from PT while in hospital to increase activity tolerance, amb endurance and balance. Patient discussed with Case Management - pt lives with  who also has dementia and has cargivers 6 - 15 and 530 to 830 pm.  Case management to follow up with family regarding pain caregiver support. Current Level of Function Impacting Discharge (mobility/balance): Min assist x 1 for transfers/amb with RW    Functional Outcome Measure: The patient scored 50/100 on the Barthel Index outcome measure.       Other factors to consider for discharge:      Patient will benefit from skilled therapy intervention to address the above noted impairments. PLAN :  Recommendations and Planned Interventions: bed mobility training, transfer training, gait training, therapeutic exercises, patient and family training/education, and therapeutic activities      Frequency/Duration: Patient will be followed by physical therapy:  5 times a week to address goals. Recommendation for discharge: (in order for the patient to meet his/her long term goals)  To be determined: ? Whether additional assist needed in home - has paid caregivers - 7 hrs day    This discharge recommendation:  Has been made in collaboration with the attending provider and/or case management    IF patient discharges home will need the following DME: patient owns DME required for discharge         SUBJECTIVE:   Patient stated I like to sit up.     OBJECTIVE DATA SUMMARY:   HISTORY:    Past Medical History:   Diagnosis Date    Breast CA ([facility] [address]     Fluid retention in legs     Hypertension     Hypothyroidism      Past Surgical History:   Procedure Laterality Date    HX ORTHOPAEDIC  2007    left hip replacement    [place] BREAST SURGERY PROCEDURE UNLISTED      Left lumpectomy       Personal factors and/or comorbidities impacting plan of care: as above    Home Situation  Home Environment: Private residence (with caregivers)  # Steps to Enter: 10  Rails to Enter: Yes  Wheelchair Ramp: Yes (ramp in back)  One/Two Story Residence: [facility]  # of Interior Steps: 3 (down to family room)  [employer]: Both  Living Alone: No (, also with dementia, caregivers)  Support Systems: Child(jennifer), Caregiver/Home Care Staff (4h am 8-12, 3h pm 530-830)  Patient Expects to be Discharged to[de-identified] Home with home health  Current DME Used/Available at Home: Annmarie Andrew, straight, Walker, rolling, Transfer bench, Grab bars  Tub or Shower Type: Tub/Shower combination    EXAMINATION/PRESENTATION/DECISION MAKING:   Critical Behavior:  Neurologic State: Alert, Confused  Orientation Level: Oriented to person, Oriented to place, Disoriented to situation, Disoriented to time  Cognition: Follows commands, Memory loss, Poor safety awareness  Safety/Judgement: Decreased insight into deficits  Hearing: Auditory  Auditory Impairment: None    Range Of Motion:  AROM: Within functional limits                       Strength:    Strength: Within functional limits                    Tone & Sensation:   Tone: Normal                              Coordination:  Coordination: Generally decreased, functional  Vision:   Acuity: Within Defined Limits  Functional Mobility:  Bed Mobility:      Pt received up in chair and returned to chair. Transfers:  Sit to Stand: Contact guard assistance  Stand to Sit: Contact guard assistance                       Balance:   Sitting: Intact  Standing: Impaired; With support  Standing - Static: Good;Constant support  Standing - Dynamic : Fair;Constant support  Ambulation/Gait Training:  Distance (ft): 50 Feet (ft)  Assistive Device: Walker, rolling;Gait belt  Ambulation - Level of Assistance: Contact guard assistance        Gait Abnormalities: Decreased step clearance;Shuffling gait        Base of Support: Center of gravity altered;Narrowed     Speed/Sonya: Slow  Step Length: Right shortened;Left shortened        Functional Measure:  Barthel Index:    Bathin  Bladder: 5  Bowels: 5  Groomin  Dressin  Feeding: 10  Mobility: 10  Stairs: 5  Toilet Use: 5  Transfer (Bed to Chair and Back): 10  Total: 55/100       The Barthel ADL Index: Guidelines  1. The index should be used as a record of what a patient does, not as a record of what a patient could do. 2. The main aim is to establish degree of independence from any help, physical or verbal, however minor and for whatever reason. 3. The need for supervision renders the patient not independent. 4. A patient's performance should be established using the best available evidence.  Asking the patient, friends/relatives and nurses are the usual sources, but direct observation and common sense are also important. However direct testing is not needed. 5. Usually the patient's performance over the preceding 24-48 hours is important, but occasionally longer periods will be relevant. 6. Middle categories imply that the patient supplies over 50 per cent of the effort. 7. Use of aids to be independent is allowed. Score Interpretation (from [address] [facility] [address])    Independent   60-79 Minimally independent   40-59 Partially dependent   20-39 Very dependent   <20 Totally dependent     -Meghan Baltazar., Barthel, D.W. (1965). Functional evaluation: the Barthel Index. [address] ([address] (1997). The Barthel activities of daily living index: self-reporting versus actual performance in the old (> or = 75 years). Journal of [address] [place] [address] 45(7), [address] [facility], SAMIR, Guillermo Hernandez., Vu Wall. (1999). Measuring the change in disability after inpatient rehabilitation; comparison of the responsiveness of the Barthel Index and Functional [place] Measure. Journal of Neurology, Neurosurgery, and Psychiatry, 66(4), 347-425. Francesco Lala, N.J.A, MARYLOU Barnes.MARIANA, & Carito Velasquez M.A. (2004) Assessment of post-stroke quality of life in cost-effectiveness studies: The usefulness of the Barthel Index and the EuroQoL-5D.  Quality of Life Research, 15, 163-27           Physical Therapy Evaluation Charge Determination   History Examination Presentation Decision-Making   LOW Complexity : Zero comorbidities / personal factors that will impact the outcome / POC LOW Complexity : 1-2 Standardized tests and measures addressing body structure, function, activity limitation and / or participation in recreation  LOW Complexity : Stable, uncomplicated  LOW Complexity : FOTO score of       Based on the above components, the patient evaluation is determined to be of the following complexity level: LOW     Pain Rating:  Pt denied pain. Activity Tolerance:   Good - noted up in chair most of day - ambulating to bathroom    After treatment patient left in no apparent distress:   Sitting in chair and Call bell within reach    COMMUNICATION/EDUCATION:   The patients plan of care was discussed with: Registered nurse. Fall prevention education was provided and the patient/caregiver indicated understanding., Patient/family have participated as able in goal setting and plan of care. , and Patient/family agree to work toward stated goals and plan of care.     Thank you for this referral.  Pako Green, PT   Time Calculation: 25 mins

## 2022-10-17 NOTE — WOUND CARE
Wound Care Note:     New consult placed by physician request for wound care. Chart shows:  Admitted for UTI and ambulatory dysfunction  Past Medical History:   Diagnosis Date    Breast CA ([address]     Fluid retention in legs     Hypertension     Hypothyroidism      WBC = 10.1 on 10/15/22  Admitted from home    Assessment:   Patient is alert and talking, continent with some assistance needed in repositioning. Bed: [facility]  Patient wearing briefs for incontinence   Diet: Adult diet regular  Patient reports no pain. Left heel, buttocks, and sacral skin intact and without erythema. Right heel with blanchable erythema. 1. Left elbow with four linear skin tears, area is approximately 6 cm x 4 cm x 0.1 cm, three of them have viable skin flaps that are able to be repositioned and covers most of wound, third skin tear unable to be reapproximated, moderate sero/sang drainage, wound edges are open, aniyah-wound with ecchymosis. Steri-strips placed over wounds, Xeroform gauze applied, covered with ABD pads and secured with roll gauze and tape. Dressing should be removed from top to bottom and this was written on dressing. Patient up in chair. Recommendations:    Left elbow- Every other day, removed dressing from top to bottom, cleanse with normal saline, apply a piece of xeroform gauze that has been folded in half, cover with ABD pads and secure with roll gauze and tape. Skin Care & Pressure Prevention:  Minimize layers of linen/pads under patient to optimize support surface. Turn/reposition approximately every 2 hours and offload heels.   Manage incontinence / promote continence   Nourishing Skin Cream to dry skin, minimize use of briefs when able    Discussed above plan with patient &  Olinda Velez RN    Transition of Care: Plan to follow as needed while admitted to hospital.    DIETER FishmanN, RN, [facility]  Certified Wound and Ostomy Nurse  office [phone]  Best way to contact me is through [address] [place] [address]

## 2022-10-17 NOTE — PROGRESS NOTES
Physical Therapy 10/17/2022    Orders received, chart reviewed and patient evaluated by physical therapy. Pending progression with skilled acute physical therapy, recommend:  No skilled physical therapy/ follow up rehabilitation needs identified at this time. Pt appears to be at baseline function, + fall risk and will benefit from PT while in hospital to increase activity tolerance, amb endurance and balance. Patient discussed with Case Management - pt lives with  who also has dementia and has cargivers 6 - 15 and 530 to 830 pm.    Recommend with nursing OOB to chair 3x/day and walking daily with standby guard assist to bathroom/hallway and rolling walker. Thank you for completing as able in order to maintain patient strength, endurance and independence. Full evaluation to follow.       Pako Green, PT

## 2022-10-17 NOTE — PROGRESS NOTES
OCCUPATIONAL THERAPY EVALUATION/DISCHARGE  Patient: Charlene Coates (74 y.o. female)  Date: 10/17/2022  Primary Diagnosis: UTI (urinary tract infection) [N39.0]  Ambulatory dysfunction [R26.2]       Precautions:   Fall    ASSESSMENT  Based on the objective data described below, the patient is functioning overall at CGA-Min A for functional mobility and ADLs and appears to be at or near baseline. Pt cleared for therapy with nursing reporting fall last night. Pt received sitting upright in chair and agreeable to therapy. Pt with dementia and able to report name and current city. Unable to report birthday. Completed sit>stand with SBA-CGA and functional mobility to bathroom for toileting and ADLs at sink (SBA-CGA for environmental cues). Pt completed further mobility with PT in hallway before returning to seat in room. Observed pt attempting to open candy bar wrapper and unable to open (appears more motor planning than fine motor) and provided assistance. Pt has caregivers 2x/day and lives with . Would recommend 24/7 supervision/assistance due to dementia. Currently anticipate no further needs for OT in the acute care setting or at discharge due to caregivers providing assistance at baseline. Current Level of Function (ADLs/self-care): SBA-Min A     Functional Outcome Measure: The patient scored 55/100 on the Barthel outcome measure which is indicative of partial dependence for self care tasks. Other factors to consider for discharge: Lives with , also with dementia. Caregivers 2x day     PLAN :  Recommend with staff: Thermon Colonel to chair with Ax1, Chair alarm, OOB to bathroom with Ax1 and RW (with cues for safe RW use)    Recommendation for discharge: (in order for the patient to meet his/her long term goals)  No skilled occupational therapy/ follow up rehabilitation needs identified at this time.     This discharge recommendation:  Has not yet been discussed the attending provider and/or case management    IF patient discharges home will need the following DME: patient owns DME required for discharge       SUBJECTIVE:   Patient stated I just need to go home.     OBJECTIVE DATA SUMMARY:   HISTORY:   Past Medical History:   Diagnosis Date    Breast CA ([facility] [address]     Fluid retention in legs     Hypertension     Hypothyroidism      Past Surgical History:   Procedure Laterality Date    HX ORTHOPAEDIC  2007    left hip replacement    [place] BREAST SURGERY PROCEDURE UNLISTED      Left lumpectomy       Prior Level of Function/Environment/Context: Pt was mod I using RW and/or cane. Lives with  and has caregivers 2x/day. Supportive children. Expanded or extensive additional review of patient history:   Home Situation  Home Environment: Private residence (with caregivers)  # Steps to Enter: 10  Rails to Enter: Yes  Wheelchair Ramp: Yes (ramp in back)  One/Two Story Residence: [facility]  # of Interior Steps: 3 (down to family room)  [employer]: Both  Living Alone: No (, also with dementia, caregivers)  Support Systems: Child(jennifer), Caregiver/Home Care Staff (4h am 8-12, 3h pm 530-830)  Current DME Used/Available at Home: Cane, straight, Walker, rolling, Transfer bench, Grab bars  Tub or Shower Type: Tub/Shower combination    EXAMINATION OF PERFORMANCE DEFICITS:  Cognitive/Behavioral Status:  Neurologic State: Alert;Confused  Orientation Level: Oriented to person;Oriented to place; Disoriented to situation;Disoriented to time  Cognition: Follows commands;Memory loss;Poor safety awareness  Perception: Appears intact  Perseveration: No perseveration noted  Safety/Judgement: Decreased insight into deficits    Skin: bandage at LUE (elbow), some bruising BUE     Edema: none noted     Hearing: Auditory  Auditory Impairment: None    Vision/Perceptual:       Acuity: Within Defined Limits       Range of Motion:  AROM: Within functional limits     Strength:  Strength:  Within functional limits Coordination:  Coordination: Generally decreased, functional  Fine Motor Skills-Upper: Left Intact; Right Intact (unable to open candy wrapper due to motor planning?)    Gross Motor Skills-Upper: Left Intact; Right Intact    Tone & Sensation:  Tone: Normal        Balance:  Sitting: Intact  Standing: Impaired; With support  Standing - Static: Good  Standing - Dynamic : Fair;Constant support    Functional Mobility and Transfers for ADLs:  Bed Mobility:   OOB    Transfers:  Sit to Stand: Contact guard assistance  Stand to Sit: Contact guard assistance  Bathroom Mobility: Contact guard assistance  Toilet Transfer : Contact guard assistance  Assistive Device : Walker, rolling;Gait Belt    ADL Assessment:  Feeding: Stand-by assistance    Oral Facial Hygiene/Grooming: Stand-by assistance    Bathing: Minimum assistance         Upper Body Dressing: Stand-by assistance    Lower Body Dressing: Minimum assistance    Toileting: Minimum assistance     ADL Intervention and task modifications:        Cognitive Retraining  Safety/Judgement: Decreased insight into deficits      Functional Measure:    Barthel Index:  Bathin  Bladder: 5  Bowels: 5  Groomin  Dressin  Feeding: 10  Mobility: 10  Stairs: 5  Toilet Use: 5  Transfer (Bed to Chair and Back): 10  Total: 55/100      The Barthel ADL Index: Guidelines  1. The index should be used as a record of what a patient does, not as a record of what a patient could do. 2. The main aim is to establish degree of independence from any help, physical or verbal, however minor and for whatever reason. 3. The need for supervision renders the patient not independent. 4. A patient's performance should be established using the best available evidence. Asking the patient, friends/relatives and nurses are the usual sources, but direct observation and common sense are also important. However direct testing is not needed.   5. Usually the patient's performance over the preceding 24-48 hours is important, but occasionally longer periods will be relevant. 6. Middle categories imply that the patient supplies over 50 per cent of the effort. 7. Use of aids to be independent is allowed. Score Interpretation (from [address])    Independent   60-79 Minimally independent   40-59 Partially dependent   20-39 Very dependent   <20 Totally dependent     -Meghan Baltazar., Barthel, D.W. (1965). Functional evaluation: the Barthel Index. [address] ([address] (1997). The Barthel activities of daily living index: self-reporting versus actual performance in the old (> or = 75 years). Journal of [address] [place] [address] 45(7), [facility], SAMIR, Jay Ruiz., Laura Betancur. (1999). Measuring the change in disability after inpatient rehabilitation; comparison of the responsiveness of the Barthel Index and Functional [place] Measure. Journal of Neurology, Neurosurgery, and Psychiatry, 66(4), 011-209. Adán Smith, N.J.A, GILMAR Barnes, & Nagi Morgan MBENNIE. (2004) Assessment of post-stroke quality of life in cost-effectiveness studies: The usefulness of the Barthel Index and the EuroQoL-5D.  Quality of Life Research, 15, 536-82         Occupational Therapy Evaluation Charge Determination   History Examination Decision-Making   MEDIUM Complexity : Expanded review of history including physical, cognitive and psychosocial  history  LOW Complexity : 1-3 performance deficits relating to physical, cognitive , or psychosocial skils that result in activity limitations and / or participation restrictions  LOW Complexity : No comorbidities that affect functional and no verbal or physical assistance needed to complete eval tasks       Based on the above components, the patient evaluation is determined to be of the following complexity level: LOW   Pain Rating:  None reported     Activity Tolerance:   Good    After treatment patient left in no apparent distress:    Sitting in chair, Call bell within reach, and Bed / chair alarm activated    COMMUNICATION/EDUCATION:   The patients plan of care was discussed with: Physical therapist, Registered nurse, and Case management.      Thank you for this referral.  Samuel Judge OT

## 2022-10-17 NOTE — PROGRESS NOTES
Bedside shift change report given to Una Cervantes  (oncoming nurse) by Clarissa Victoria (offgoing nurse). Report included the following information SBAR, Kardex, Intake/Output, and MAR.

## 2022-10-17 NOTE — CONSULTS
NEUROLOGY CONSULT NOTE    Name Celso Stoner Age 81 y.o. MRN [id number]  1935     Consulting Physician: Gilmer Castaneda MD      Chief Complaint:  AMS     Assessment:     Active Problems:    UTI (urinary tract infection) (2018)      Ambulatory dysfunction (10/15/2022)        81 y.o. female with a history of HTN, hypothyroidism, breast cancer, CVA, CKD stage III, L frontal and temporal lobe infarcts  and dementia who presents with recent falls and confusion in setting of UTI. LDL 79.8, TSH 5.21  Recommendations:   1.) AMS:  - Likely this is exacerbated by underlying dementia, UTI and dehydration on presentation (BUN 34/ Cr 1.35). - Exam is back to baseline per the son and there is some cognitive dysfunction (baseline) but no other focal neurological deficits. - Suspect any fluctuation with cognition will improve with fluids and treatment of UTI  - Known hypothyroid but send fT3/T4  - She has statin allergy with myalgias. Continue Zetia 10 mg daily. - continue aspirin 81 mg for secondary stroke prevention  - There is no indication for further imaging at this time. - I discussed with the son the patient's safety at home especially at night and the need for possible 24h assistance. Neurology has no further recommendations and we will sign-off. Please contact us with any questions. She does not need to follow-up in the clinic. History of Present Illness: This is a 81 y.o. female with a history of HTN, hypothyroidism, breast cancer, CVA, CKD stage III, L frontal and temporal lobe infarcts  and dementia who presents who was noted to be increasingly confused since Sat. 10/15/22 and recent falls. She was brought to the ED due to concern for a UTI since family states that she has a history of these inducing AMS and she also complained of R-side rib pain. The family is concerned because she had been living independently with home health during the week but not on the weekends. Her son, Jaspreet Kim, states that he found her 2 days ago sitting on the couch in the dark and she seemed more disoriented. She walked up a couple stairs and then fell down and against the wall not striking her head. She has had no syncope, headache, palpitations, or SOB. She had another fall about a week prior that seemed mechanical in nature. She lives with her  and they both have home assistance during the day but they are alone at night. Her son now lives in a trailer behind the house when he is in town. The son states that she is currently at her baseline on my exam. We are asked to consult on the patient for AMS. Allergies   Allergen Reactions    Sulfamethoxazole-Trimethoprim Hives     Treated with diphenhydramine    Statins-Hmg-Coa Reductase Inhibitors Unknown (comments)     Leg cramps/weakness    Darvocet A500 [Propoxyphene N-Acetaminophen] Itching    Percocet [Oxycodone-Acetaminophen] Itching    Rocephin [Ceftriaxone] Swelling     Questionable allergy, had facial swelling morning after IV administration        Prior to Admission medications    Medication Sig Start Date End Date Taking? Authorizing Provider   diclofenac (VOLTAREN) 1 % gel Apply 2 g to affected area daily. to knees for arthritic pain 1/12/21   Provider, Historical   haloperidoL (HALDOL) 0.5 mg tablet Take 0.5 mg by mouth every other day. 12/6/20   Provider, Historical   aspirin delayed-release 81 mg tablet Take 81 mg by mouth daily. 1/22/20   Provider, Historical   acetaminophen (TYLENOL) 500 mg tablet Take 500 mg by mouth every six (6) hours as needed for Pain. 1/22/20   Provider, Historical   melatonin 3 mg tablet Take 1 Tab by mouth nightly as needed (sleep). Patient not taking: Reported on 1/6/2021 1/22/20   Jared Smith MD   donepeziL (ARICEPT) 5 mg tablet Take 5 mg by mouth nightly. Provider, Historical   memantine ER (NAMENDA XR) 28 mg capsule Take 28 mg by mouth daily.     Provider, Historical   cranberry 500 mg capsule Take 500 mg by mouth daily. 7/31/18   Provider, Historical   multivit-min/iron/folic/lutein (CENTRUM SILVER WOMEN PO) Take 1,500 mg by mouth daily. Richard Llanos MD   escitalopram oxalate (LEXAPRO) 20 mg tablet Take 20 mg by mouth daily. Provider, Historical   levothyroxine (SYNTHROID) 75 mcg tablet Take 75 mcg by mouth Daily (before breakfast). Take 75 mcg by mouth Monday through Saturday. Take half-tablet (50 mcg) by mouth on Sunday 2/12/20   Provider, Historical       Past Medical History:   Diagnosis Date    Breast CA ([facility] [address]     Fluid retention in legs     Hypertension     Hypothyroidism         Past Surgical History:   Procedure Laterality Date    HX ORTHOPAEDIC  2007    left hip replacement    [place] BREAST SURGERY PROCEDURE UNLISTED      Left lumpectomy        Social History     Tobacco Use    Smoking status: Every Day    Smokeless tobacco: Never   Substance Use Topics    Alcohol use: Yes     Alcohol/week: 0.0 standard drinks        No family history on file. Review of Systems:   Comprehensive review of systems performed and negative except for as listed above. Exam:     Visit Vitals  BP (!) 150/68 (BP 1 Location: Right upper arm, BP Patient Position: At rest)   Pulse 60   Temp 97.3 °F (36.3 °C)   Resp 16   Wt 58.4 kg (128 lb 12.4 oz)   SpO2 97%   BMI 22.10 kg/m²        General: Well developed, slightly frail. Patient in no apparent distress   Head: Normocephalic, atraumatic, anicteric sclera   Lungs:  Clear to auscultation bilaterally, No wheezes or rubs   Cardiac: Regular rate and rhythm with no murmurs. Abd: Bowel sounds were audible. No tenderness on palpation   Ext: No pedal edema   Skin: No overt signs of rash     Neurological Exam:  Mental Status: A&O x1-2. States \"hospital\" but does not know name. Does not know year. Not able to spell [facility]. Cannot do serial 7's. Speech: Fluent no aphasia or dysarthria. Cranial Nerves:   Intact visual fields.   Facial sensation is normal. Facial movement is symmetric. Palate is midline. Eyes: PERRL, EOM's full, no nystagmus, no ptosis. Motor: Follows commands 4/5 x4   Reflexes:   Plantar response is downgoing b/l. Sensory:   Symmetrically intact  with no perceived deficits modalities involving small or large fibers. Gait:  Not assessed   Tremor:   Mild tremor noted with FTN   Cerebellar:  FTN intact           Imaging  I personally reviewed the following imaging:  CT Results (maximum last 3): Results from Hospital Encounter encounter on 07/03/22    CTA CHEST W OR W WO CONT    Narrative  EXAM: CTA CHEST W OR W WO CONT    INDICATION: recent covid. Confusion. Rule out PE    COMPARISON: 8/9/2011. CONTRAST: 80 mL of Isovue-370. TECHNIQUE:  Precontrast  images were obtained to localize the volume for acquisition. Multislice helical CT arteriography was performed from the diaphragm to the  thoracic inlet during uneventful rapid bolus intravenous contrast  administration. Lung and soft tissue windows were generated. Coronal and  sagittal images were generated and 3D post processing consisting of coronal  maximum intensity images was performed. CT dose reduction was achieved through  use of a standardized protocol tailored for this examination and automatic  exposure control for dose modulation. FINDINGS:  The lungs are clear of mass, nodule, airspace disease or edema. Mild diffuse  emphysematous change is noted. The pulmonary arteries are well enhanced and no pulmonary emboli are identified. The right and left pulmonary arteries are enlarged. The aortic arch is  atherosclerotic. There is no mediastinal or hilar adenopathy or mass. The aorta enhances normally  without evidence of aneurysm or dissection. The visualized portions of the upper abdominal organs are remarkable for  bilateral nonobstructing renal calculi as well as a left renal 2 x 1 cm cyst,  for which no additional evaluation is needed.  Marked colonic diverticulosis is  seen. .    Impression  No pulmonary embolism  Incidental emphysema with pulmonary arterial hypertension. CT HEAD WO CONT    Narrative  EXAM: CT HEAD WO CONT    INDICATION: confusion, stroke symnptoms resolved. unknwon last known well    COMPARISON: MRI 1/19/2020. CONTRAST: None. TECHNIQUE: Unenhanced CT of the head was performed using 5 mm images. Brain and  bone windows were generated. Coronal and sagittal reformats. CT dose reduction  was achieved through use of a standardized protocol tailored for this  examination and automatic exposure control for dose modulation. FINDINGS:  There is moderate atrophy and compensatory dilatation of the ventricles. There  is mild chronic and septal malacia in the posterior lateral left frontal lobe. .  There is no intracranial hemorrhage, extra-axial collection, or mass effect. The  basilar cisterns are open. No CT evidence of acute infarct. The bone windows demonstrate no abnormalities. The visualized portions of the  paranasal sinuses and mastoid air cells are clear. The patient is status post  bilateral lens surgery. Impression  No evidence of acute process. Results from [place] encounter on 01/18/20    CT HEAD WO CONT    Narrative  EXAM: CT HEAD WO CONT    INDICATION: Left MCA ischemic infarct reevaluation    COMPARISON: 1/18/2020. CONTRAST: None. TECHNIQUE: Unenhanced CT of the head was performed using 5 mm images. Brain and  bone windows were generated. CT dose reduction was achieved through use of a  standardized protocol tailored for this examination and automatic exposure  control for dose modulation. FINDINGS:  The ventricles and sulci are normal in size, shape and configuration and  midline. There is no significant white matter disease. There is no intracranial  hemorrhage, extra-axial collection, mass, mass effect or midline shift. The  basilar cisterns are open. No acute infarct is identified.  The bone windows  demonstrate no abnormalities. The visualized portions of the paranasal sinuses  and mastoid air cells are clear. Impression  IMPRESSION: No evidence for hemorrhagic transformation or mass effect from the  known left M2 ischemic infarct      MRI Results (maximum last 3): Results from Hospital Encounter encounter on 01/18/20    MRI BRAIN WO CONT    CLINICAL HISTORY: Left-sided CVA    INDICATION: Left-sided CVA. COMPARISON: CT and CTA 1/18/2020    TECHNIQUE: MR examination of the brain includes axial and sagittal T1, coronal  T2, axial T2, axial FLAIR, axial gradient echo, axial DWI. CONTRAST: None    FINDINGS:  Subacute cortical infarction left frontal lobe and left temporal lobe with  punctate foci of acute infarction in the left WALDEMAR territory anterior superior  left frontal lobe, periventricular left corona radiata. No other infarctions are  demonstrated. There is sulcal and ventricular prominence which is temporal predominant. Minimal periventricular foci of increased T2 signal intensity. The brain  architecture is normal. There is no evidence of midline shift or mass-effect. There are no extra-axial fluid collections. There is no Chiari or syrinx. The  pituitary and infundibulum are grossly unremarkable. There is no skull base  mass. Cerebellopontine angles are grossly unremarkable. The major intracranial  vascular flow-voids are unremarkable. The cavernous sinuses are symmetric. Optic  chiasm and infundibulum grossly unremarkable. Orbits are grossly symmetric. Dural venous sinuses are grossly patent. The mastoid air cells are well pneumatized and clear. Impression  IMPRESSION:  Cortical infarction overlying the left frontal and left temporal lobe. Punctate  foci of parenchymal infarction in the anterior superior left frontal lobe and  periventricular left corona radiata. There is no superimposed hemorrhage,  midline shift or mass effect.     There is moderate to severe temporal predominant cerebral atrophy.       Lab Review  Lab Results   Component Value Date/Time    WBC 10.1 10/15/2022 06:06 PM    HCT 40.9 10/15/2022 06:06 PM    HGB 13.6 10/15/2022 06:06 PM    PLATELET 483 57/50/0022 06:06 PM     Lab Results   Component Value Date/Time    Sodium 143 10/17/2022 03:15 AM    Potassium 4.0 10/17/2022 03:15 AM    Chloride 113 (H) 10/17/2022 03:15 AM    CO2 24 10/17/2022 03:15 AM    Glucose 78 10/17/2022 03:15 AM    BUN 30 (H) 10/17/2022 03:15 AM    Creatinine 1.32 (H) 10/17/2022 03:15 AM    Calcium 8.8 10/17/2022 03:15 AM       Signed:  Sabrina Francis, ACNP

## 2022-10-18 PROCEDURE — 74011000250 HC RX REV CODE- 250: Performed by: STUDENT IN AN ORGANIZED HEALTH CARE EDUCATION/TRAINING PROGRAM

## 2022-10-18 PROCEDURE — 65270000029 HC RM PRIVATE

## 2022-10-18 PROCEDURE — 74011250637 HC RX REV CODE- 250/637: Performed by: INTERNAL MEDICINE

## 2022-10-18 PROCEDURE — 74011250637 HC RX REV CODE- 250/637: Performed by: STUDENT IN AN ORGANIZED HEALTH CARE EDUCATION/TRAINING PROGRAM

## 2022-10-18 PROCEDURE — 74011250636 HC RX REV CODE- 250/636: Performed by: STUDENT IN AN ORGANIZED HEALTH CARE EDUCATION/TRAINING PROGRAM

## 2022-10-18 PROCEDURE — 74011250636 HC RX REV CODE- 250/636: Performed by: INTERNAL MEDICINE

## 2022-10-18 PROCEDURE — 97116 GAIT TRAINING THERAPY: CPT

## 2022-10-18 RX ADMIN — MEMANTINE HYDROCHLORIDE 10 MG: 10 TABLET ORAL at 22:20

## 2022-10-18 RX ADMIN — ENOXAPARIN SODIUM 30 MG: 100 INJECTION SUBCUTANEOUS at 10:10

## 2022-10-18 RX ADMIN — Medication 1 CAPSULE: at 10:10

## 2022-10-18 RX ADMIN — LEVOFLOXACIN 250 MG: 250 TABLET, FILM COATED ORAL at 14:43

## 2022-10-18 RX ADMIN — ESCITALOPRAM OXALATE 20 MG: 10 TABLET ORAL at 10:10

## 2022-10-18 RX ADMIN — SODIUM CHLORIDE, PRESERVATIVE FREE 10 ML: 5 INJECTION INTRAVENOUS at 22:20

## 2022-10-18 RX ADMIN — MEMANTINE HYDROCHLORIDE 10 MG: 10 TABLET ORAL at 08:00

## 2022-10-18 RX ADMIN — LEVOTHYROXINE SODIUM 88 MCG: 0.09 TABLET ORAL at 07:43

## 2022-10-18 RX ADMIN — EZETIMIBE 10 MG: 10 TABLET ORAL at 10:10

## 2022-10-18 RX ADMIN — SODIUM CHLORIDE 75 ML/HR: 9 INJECTION, SOLUTION INTRAVENOUS at 22:20

## 2022-10-18 RX ADMIN — SODIUM CHLORIDE 50 ML/HR: 9 INJECTION, SOLUTION INTRAVENOUS at 03:40

## 2022-10-18 RX ADMIN — ASPIRIN 81 MG: 81 TABLET, COATED ORAL at 10:10

## 2022-10-18 RX ADMIN — DONEPEZIL HYDROCHLORIDE 5 MG: 5 TABLET, FILM COATED ORAL at 22:20

## 2022-10-18 RX ADMIN — SODIUM CHLORIDE, PRESERVATIVE FREE 10 ML: 5 INJECTION INTRAVENOUS at 07:43

## 2022-10-18 NOTE — PROGRESS NOTES
Problem: Mobility Impaired (Adult and Pediatric)  Goal: *Acute Goals and Plan of Care (Insert Text)  Outcome: Progressing Towards Goal   FUNCTIONAL STATUS PRIOR TO ADMISSION: Patient was modified independent using a rolling walker for functional mobility. HOME SUPPORT PRIOR TO ADMISSION:  Pt lived with spouse who also has dementia - has local support from daughter - has paid caregivers - 4 hrs in am and 3 hrs in pm.     Physical Therapy Goals  Initiated 10/17/2022  1. Patient will move from supine to sit and sit to supine  and scoot up and down in bed with modified independence within 7 day(s). 2.  Patient will transfer from bed to chair and chair to bed with modified independence using the least restrictive device within 7 day(s). 3.  Patient will perform sit to stand with modified independence within 7 day(s). 4.  Patient will ambulate with modified independence for > 150 feet with the least restrictive device within 7 day(s). 5.  Patient will ascend/descend 4 stairs with one handrail(s) with supervision/set-up within 7 day(s). PHYSICAL THERAPY TREATMENT  Patient: Josue Mirza (61 y.o. female)  Date: 10/18/2022  Diagnosis: UTI (urinary tract infection) [N39.0]  Ambulatory dysfunction [R26.2] <principal problem not specified>      Precautions: Fall  Chart, physical therapy assessment, plan of care and goals were reviewed. ASSESSMENT  Patient continues with skilled PT services and is progressing towards goals. She is able to increase gait distance minimally. Amb pprox 61 Ft w/ the RW w/ CGA- to [facility] for RW management d/t path deviations and pt minimally walking into object walker first. Pt used bathroom prior to returning to chair. CGA for sit<>stand from elevated toilet seat. Pt returned to chair w/ all items in reach/needs met.      Current Level of Function Impacting Discharge (mobility/balance): CGA/Min Ax1    Other factors to consider for discharge: Pt w/ history of dementia (Alzheimer's per chart review). Lives w/  (also has dementia). Has caregivers x7hrs/day-will benefit from increased assist. Fall Risk         PLAN :  Patient continues to benefit from skilled intervention to address the above impairments. Continue treatment per established plan of care. to address goals. Recommendation for discharge: (in order for the patient to meet his/her long term goals)  Physical therapy at least 2 days/week in the home w/24 hr family/caregiver assist/ supervision. If unable to secure additional assist, recommend SNF rehab. This discharge recommendation:  Has been made in collaboration with the attending provider and/or case management    IF patient discharges home will need the following DME: patient owns DME required for discharge       SUBJECTIVE:   Patient stated My feet get cold.  during session. Pt also reported feeling dizzy, however /83 mmHg. OBJECTIVE DATA SUMMARY:   Critical Behavior:  Neurologic State: (P) Confused  Orientation Level: (P) Oriented to person  Cognition: (P) Impaired decision making, Decreased command following, Poor safety awareness  Safety/Judgement: Decreased insight into deficits  Functional Mobility Training:  Bed Mobility:     Supine to Sit:  (received OOB in chair)  Sit to Supine:  (returned to chair)           Transfers:  Sit to Stand: Contact guard assistance  Stand to Sit: Contact guard assistance                             Balance:  Sitting: Intact  Standing: Impaired; With support  Standing - Static: Constant support;Good  Standing - Dynamic : Constant support; Fair  Ambulation/Gait Training:  Distance (ft): 60 Feet (ft)  Assistive Device: Gait belt;Walker, rolling  Ambulation - Level of Assistance: Contact guard assistance        Gait Abnormalities: Decreased step clearance        Base of Support: Center of gravity altered     Speed/Sonya: Slow  Step Length: Left shortened;Right shortened    Pain Rating:  No c/o pain.     Activity Tolerance: Good    After treatment patient left in no apparent distress:   Sitting in chair, Call bell within reach, Bed / chair alarm activated, and Caregiver / family present    COMMUNICATION/COLLABORATION:   The patients plan of care was discussed with: Registered nurse.      Sally Paredes PTA   Time Calculation: 15 mins

## 2022-10-18 NOTE — PROGRESS NOTES
[facility] [facility] Adult  Hospitalist Group                                                                                          Hospitalist Progress Note  Katheryn Lora MD  Answering service: [phone] from in house phone        Date of Service:  10/18/2022  NAME:  Bunny Wyatt  :  1935  MRN:  [id number]      Admission Summary:   Excerpt of HPI: \"--Mary Mic Davis is a 81 y.o. female `with hx of htn, hypothyroidism, breast cancer, cva, ckd iii-iv, dementia who presents to hospital for ams. Family at bedside states patient has dementia more stable/known baseline. She was noted to be increasingly confused over the last 4 days. Family bedside states this typically occurs with urinary tract infections. She was brought to ED to evaluate for acute cystitis. Patient is seen and examined at bedside. She is conversant and has no acute complaints or concerns. The patient denies any fever, chills, chest or abdominal pain, nausea, vomiting, cough, congestion, recent illness, palpitations, or dysuria. --\"       Interval history / Subjective:   Daugher in law present, seen earlier, waiting on sensitivity of presumptive enterococcus     Assessment & Plan:      Altered mental status, delirium  -in setting of dementia history (son reports alzheimers)  -in setting of recurrent uti, on abx  -in setting of hx of   -neurology consulted (hx of cva)    UTI, acute cystitis by UA  -hx of enterococcus uti sensitive to levaquin  ---->>>presumptive enterococcus at this time, pending sensitivity     SHERINE, in setting of poor po intake  -ivf   -trend with intermittent labs    Hx of CVA, intolerant to statins in the past?  -on aspirin  -start zetia 10/16/22  -check lipid panel     dementia and delirium  -has been on prn haldol prior to admission  -resume home meds  -redirect as needed  -supportive measures  -monitor    Hx of depression  -does not voice si/hi/hallucinations  -resume med    Mild bradycardia  -resume telemetry, may need to hold aricept pending trend     Hx of hypothyroidism  -on replacement  -tsh level slightly out of range, increased levothyroxine dose and will need repeat tsh level in 4-6 weeks. 10/16/2022:  S/p fall, hospitalist examined patient in presence of patient's son nurse which the nurse had already placed dressing over left upper extremity  -resume daily dressing change/assessment     See other orders for plan       Code status: full as per admission   Prophylaxis: lovenox  Care Plan discussed with: patient/daugher in law  Anticipated Disposition: tbd      Pending neurology eval, followup on urine culture     Hospital Problems  Date Reviewed: 7/4/2022            Codes Class Noted POA    Ambulatory dysfunction ICD-10-CM: R26.2  ICD-9-CM: 719.7  10/15/2022 Unknown        UTI (urinary tract infection) ICD-10-CM: N39.0  ICD-9-CM: 599.0  7/19/2018 Unknown         Review of Systems:   A comprehensive review of systems was negative except for that written in the HPI. Vital Signs:    Last 24hrs VS reviewed since prior progress note. Most recent are:  Visit Vitals  /68 (BP 1 Location: Right upper arm, BP Patient Position: Sitting)   Pulse 60   Temp 98.1 °F (36.7 °C)   Resp 16   Wt 58.4 kg (128 lb 12.4 oz)   SpO2 96%   BMI 22.10 kg/m²         Intake/Output Summary (Last 24 hours) at 10/18/2022 1409  Last data filed at 10/18/2022 0957  Gross per 24 hour   Intake 360 ml   Output --   Net 360 ml          Physical Examination:     I had a face to face encounter with this patient and independently examined them on 10/18/2022 as outlined below:          Constitutional:    Eyes:  No acute distress, cooperative, pleasant       Anicteric, no injection   ENT:  Oral mucosa moist, oropharynx benign. Pulm:    Chest:  CTA bilaterally. No wheezing/rhonchi/rales. No accessory muscle use. Nonlabored breathing, nontender   CV:  Regular rhythm, normal rate, no rubs    GI:  Soft, non distended, non tender. normoactive bowel sounds   Musculoskeletal:    Skin:  No edema, warm, 2+ pulses throughout, ecchymosis and superficial skin tear of left upper extremity      No jaundice, no cyanosis, dry skin    Neurologic:  Moves all extremities.   Awake, oriented to person       Psych: not agitated, not anxious     Data Review:    Review and/or order of clinical lab test      Labs:     Recent Labs     10/15/22  1806   WBC 10.1   HGB 13.6   HCT 40.9          Recent Labs     10/17/22  0315 10/15/22  1806    140   K 4.0 4.5   * 107   CO2 24 30   BUN 30* 34*   CREA 1.32* 1.35*   GLU 78 123*   CA 8.8 9.6   PHOS 2.8  --        Recent Labs     10/17/22  0315 10/15/22  1806   ALT  --  29   AP  --  80   TBILI  --  0.4   TP  --  7.7   ALB 3.0* 3.8   GLOB  --  3.9         Lab Results   Component Value Date/Time    Cholesterol, total 138 10/17/2022 03:15 AM    HDL Cholesterol 32 10/17/2022 03:15 AM    LDL, calculated 79.8 10/17/2022 03:15 AM    Triglyceride 131 10/17/2022 03:15 AM    CHOL/HDL Ratio 4.3 10/17/2022 03:15 AM     No results found for: [facility]  Lab Results   Component Value Date/Time    Color YELLOW/STRAW 10/15/2022 06:55 PM    Appearance TURBID (A) 10/15/2022 06:55 PM    Specific gravity 1.016 10/15/2022 06:55 PM    Specific gravity 1.015 01/18/2020 11:13 AM    pH (UA) 5.5 10/15/2022 06:55 PM    Protein 30 (A) 10/15/2022 06:55 PM    Glucose Negative 10/15/2022 06:55 PM    Ketone Negative 10/15/2022 06:55 PM    Bilirubin Negative 10/15/2022 06:55 PM    Urobilinogen 0.2 10/15/2022 06:55 PM    Nitrites Negative 10/15/2022 06:55 PM    Leukocyte Esterase LARGE (A) 10/15/2022 06:55 PM    Epithelial cells FEW 10/15/2022 06:55 PM    Bacteria 3+ (A) 10/15/2022 06:55 PM    WBC >100 (H) 10/15/2022 06:55 PM    RBC 10-20 10/15/2022 06:55 PM         Medications Reviewed:     Current Facility-Administered Medications   Medication Dose Route Frequency    levothyroxine (SYNTHROID) tablet 88 mcg  88 mcg Oral ACB    sodium chloride (NS) flush 5-40 mL  5-40 mL IntraVENous Q8H    sodium chloride (NS) flush 5-40 mL  5-40 mL IntraVENous PRN    polyethylene glycol (MIRALAX) packet 17 g  17 g Oral DAILY PRN    ondansetron (ZOFRAN ODT) tablet 4 mg  4 mg Oral Q8H PRN    Or    ondansetron (ZOFRAN) injection 4 mg  4 mg IntraVENous Q6H PRN    enoxaparin (LOVENOX) injection 30 mg  30 mg SubCUTAneous DAILY    0.9% sodium chloride infusion  75 mL/hr IntraVENous CONTINUOUS    levoFLOXacin (LEVAQUIN) tablet 250 mg  250 mg Oral Q24H    hydrALAZINE (APRESOLINE) 20 mg/mL injection 10 mg  10 mg IntraVENous Q6H PRN    L.acidophilus-paracasei-S.thermophil-bifidobacter (RISAQUAD) 8 billion cell capsule  1 Capsule Oral DAILY    ezetimibe (ZETIA) tablet 10 mg  10 mg Oral DAILY    aspirin delayed-release tablet 81 mg  81 mg Oral DAILY    diclofenac (VOLTAREN) 1 % topical gel 2 g  2 g Topical DAILY    donepeziL (ARICEPT) tablet 5 mg  5 mg Oral QHS    escitalopram oxalate (LEXAPRO) tablet 20 mg  20 mg Oral DAILY    haloperidoL (HALDOL) tablet 0.5 mg  0.5 mg Oral QHS PRN    memantine (NAMENDA) tablet 10 mg  10 mg Oral BID     ______________________________________________________________________  EXPECTED LENGTH OF STAY: 3d 19h  ACTUAL LENGTH OF STAY:          3                 Angel Causey MD

## 2022-10-18 NOTE — PROGRESS NOTES
Bedside shift change report given to [employer]  (oncoming nurse) by Maritza Kitchen  (offgoing nurse). Report included the following information SBAR, Kardex, Intake/Output, and MAR.

## 2022-10-18 NOTE — PROGRESS NOTES
RUR 13 %    Transition of Care- Home with home health PT and [place] givers- referral sent to [facility]- patient used this agency back in July 2022. The patient owns necessary DME for home. The family will transport when medically stable. Follow up with PCP and (Specialist if needed.)     Private Care givers for patient and her spouse- 8:30 am-12:30 pm and 5:30 pm to 8:30 pm. Patient is modified independent at base line using a RW and or Cane. Decision Makers/Son's- Mary Camarena- [phone] and Bijan Farias- [phone]. Reason for Admission:  Increased confusion- UTI                     RUR Score:     13 %                Plan for utilizing home health:     Referral to [facility] for Home PT    Transition of Care Plan:     The Plan for Transition of Care is related to the following treatment goals: [facility]    The Patient and/or patient representative  was provided with a choice of provider and agrees  with the discharge plan. Yes [x] No []    A Freedom of choice list was provided with basic dialogue that supports the patient's individualized plan of care/goals and shares the quality data associated with the providers. Yes [x] No []       PCP: First and Last name:  Sarah Barajas MD     Name of Practice:    Are you a current patient: Yes/No:    Approximate date of last visit: 2 months ago   Can you participate in a virtual visit with your PCP: no                    Current Advanced Directive/Advance Care Plan: Full Code      Healthcare Decision Maker:   Click here to complete [address] including selection of the Healthcare Decision Maker Relationship (ie \"Primary\")             Primary Decision Maker: Thai Marc - Son - [phone]    Primary Decision Maker: Viola Doll - Son - [phone]                  Transition of Care Plan:   Cultures pending (UTI) PT following- home with private care givers and home health PT.      CM met with the patient and her son Chris An. The patient resides with her spouse- they have private care givers in the home from 8:30 am-12:30 pm and then 5:30 pm- 8:30 pm . The patient and her spouse have good family support. The patient is modified independent using a RW or cane for ambulation. The patient has used [facility] for home PT in the past.     CM confirmed demographics, Insurance, PCP, and Pharmacy- [facility]. Care Management Interventions  PCP Verified by CM:  Yes  Palliative Care Criteria Met (RRAT>21 & CHF Dx)?: No  Mode of Transport at Discharge: Self  Transition of Care Consult (CM Consult): [address]: Yes  MyChart Signup: No  Discharge Durable Medical Equipment: No  Health Maintenance Reviewed: Yes  Physical Therapy Consult: Yes  Occupational Therapy Consult: No  Speech Therapy Consult: No  Support Systems: Child(jennifer)  Confirm Follow Up Transport: Family  The Plan for Transition of Care is Related to the Following Treatment Goals : UTI- culture pending- PT- home with home health PT and care givers  The Patient and/or Patient Representative was Provided with a Choice of Provider and Agrees with the Discharge Plan?: Yes  Name of the Patient Representative Who was Provided with a Choice of Provider and Agrees with the Discharge Plan: the patient and the son Teresa Willis of Choice List was Provided with Basic Dialogue that Supports the Patient's Individualized Plan of Care/Goals, Treatment Preferences and Shares the Quality Data Associated with the Providers?: Yes  [employer] Information Provided?: No  Discharge Location  Patient Expects to be Discharged to[de-identified] Home with home health    JAIMIE Castillo

## 2022-10-19 ENCOUNTER — HOME HEALTH ADMISSION (OUTPATIENT)
Dept: HOME HEALTH SERVICES | Facility: HOME HEALTH | Age: 87
End: 2022-10-19
Payer: MEDICARE

## 2022-10-19 PROCEDURE — 97116 GAIT TRAINING THERAPY: CPT

## 2022-10-19 PROCEDURE — 74011000250 HC RX REV CODE- 250: Performed by: STUDENT IN AN ORGANIZED HEALTH CARE EDUCATION/TRAINING PROGRAM

## 2022-10-19 PROCEDURE — 74011250636 HC RX REV CODE- 250/636: Performed by: STUDENT IN AN ORGANIZED HEALTH CARE EDUCATION/TRAINING PROGRAM

## 2022-10-19 PROCEDURE — 74011250637 HC RX REV CODE- 250/637: Performed by: STUDENT IN AN ORGANIZED HEALTH CARE EDUCATION/TRAINING PROGRAM

## 2022-10-19 PROCEDURE — 65270000029 HC RM PRIVATE

## 2022-10-19 PROCEDURE — 74011250637 HC RX REV CODE- 250/637: Performed by: INTERNAL MEDICINE

## 2022-10-19 RX ADMIN — ASPIRIN 81 MG: 81 TABLET, COATED ORAL at 08:59

## 2022-10-19 RX ADMIN — MEMANTINE HYDROCHLORIDE 10 MG: 10 TABLET ORAL at 07:38

## 2022-10-19 RX ADMIN — Medication 1 CAPSULE: at 08:59

## 2022-10-19 RX ADMIN — SODIUM CHLORIDE, PRESERVATIVE FREE 10 ML: 5 INJECTION INTRAVENOUS at 14:00

## 2022-10-19 RX ADMIN — SODIUM CHLORIDE, PRESERVATIVE FREE 10 ML: 5 INJECTION INTRAVENOUS at 22:34

## 2022-10-19 RX ADMIN — ENOXAPARIN SODIUM 30 MG: 100 INJECTION SUBCUTANEOUS at 08:59

## 2022-10-19 RX ADMIN — LEVOFLOXACIN 250 MG: 250 TABLET, FILM COATED ORAL at 15:00

## 2022-10-19 RX ADMIN — DONEPEZIL HYDROCHLORIDE 5 MG: 5 TABLET, FILM COATED ORAL at 22:34

## 2022-10-19 RX ADMIN — EZETIMIBE 10 MG: 10 TABLET ORAL at 08:59

## 2022-10-19 RX ADMIN — MEMANTINE HYDROCHLORIDE 10 MG: 10 TABLET ORAL at 22:34

## 2022-10-19 RX ADMIN — LEVOTHYROXINE SODIUM 88 MCG: 0.09 TABLET ORAL at 07:38

## 2022-10-19 RX ADMIN — ESCITALOPRAM OXALATE 20 MG: 10 TABLET ORAL at 08:59

## 2022-10-19 NOTE — PROGRESS NOTES
A Spiritual Care Partner Volunteer visited patient in [address] [place] [address] on 10/19/2022.   Documented by:  Chaplain Schuster MDiv, MS, [facility]

## 2022-10-19 NOTE — PROGRESS NOTES
Physical Therapy  10/19/2022    Chart reviewed and POC reviewed w/ Rehab Tech. Pt amb w/ rehab tech assist (CGA) w/ RW in hallway to maintain strength and activity tolerance. Also noted pt had been up and amb this morning w/ RW and family assist. Will continue to follow for  continued therapy. Therapy recommending HHPT w/ 24hr supervision/assist from family/caregivers  at d/c (if unable to secure assist, recommend SNF ).         Sally Paredes, PTA

## 2022-10-19 NOTE — PROGRESS NOTES
[facility] Adult  Hospitalist Group                                                                                          Hospitalist Progress Note  Kristofer Frederick MD  Answering service: [phone] OR [phone] from in house phone        Date of Service:  10/19/2022  NAME:  Larita Boeck  :  1935  MRN:  [id number]      Admission Summary:   Excerpt of HPI: \"--Mary Ramirez is a 81 y.o. female `with hx of htn, hypothyroidism, breast cancer, cva, ckd iii-iv, dementia who presents to hospital for ams. Family at bedside states patient has dementia more stable/known baseline. She was noted to be increasingly confused over the last 4 days. Family bedside states this typically occurs with urinary tract infections. She was brought to ED to evaluate for acute cystitis. Patient is seen and examined at bedside. She is conversant and has no acute complaints or concerns. The patient denies any fever, chills, chest or abdominal pain, nausea, vomiting, cough, congestion, recent illness, palpitations, or dysuria. --\"       Interval history / Subjective:   Joel in law present again when seen in am, smiling, has blankets over her, arm dressing dry dated the prior day    Spoke with lab this afternoon to micro dept who states they will not have any more information about the urine culture until tomorrow (this is to make sure this is not vre as currently growing enterococcus)     Assessment & Plan:      Altered mental status, delirium--->>calm on 10/19/2022, oriented to self and recognizes family member, forgetful and no recall of year  -in setting of dementia history (son reports alzheimers)  -in setting of recurrent uti, on abx  -in setting of hx of   -neurology consulted (hx of cva)    UTI, acute cystitis by UA  -hx of enterococcus uti sensitive to levaquin  ---->>>presumptive enterococcus at this time, pending sensitivity  10/19: Spoke with lab this afternoon to micro dept who states they will not have any more information about the urine culture until tomorrow (this is to make sure this is not vre as currently growing enterococcus)     Mild renal insufficiency, in setting of poor po intake  -ivf   -srcr stable around 1.3  -trend with intermittent labs    Hx of CVA, intolerant to statins in the past?  -on aspirin  -start zetia 10/16/22  -lipid panel     dementia and delirium  -has been on prn haldol prior to admission  -resume home meds  -redirect as needed  -supportive measures  -monitor    Hx of depression  -does not voice si/hi/hallucinations  -resume med    Mild bradycardia, resolved  -resume telemetry      Hx of hypothyroidism  -on replacement  -tsh level slightly out of range, increased levothyroxine dose and will need repeat tsh level in 4-6 weeks. 10/16/2022:  S/p fall, hospitalist examined patient in presence of patient's son nurse which the nurse had already placed dressing over left upper extremity  -resume daily dressing change/assessment     See other orders for plan       Code status: full as per admission   Prophylaxis: lovenox  Care Plan discussed with: patient/daugher in law  Anticipated Disposition: tbd      10/19: Spoke with lab this afternoon to micro dept who states they will not have any more information about the urine culture until tomorrow (this is to make sure this is not vre as currently growing enterococcus)  ---->>>hoping for discharge tomorrow now     Hospital Problems  Date Reviewed: 7/4/2022            Codes Class Noted POA    Ambulatory dysfunction ICD-10-CM: R26.2  ICD-9-CM: 719.7  10/15/2022 Unknown        UTI (urinary tract infection) ICD-10-CM: N39.0  ICD-9-CM: 599.0  7/19/2018 Unknown         Review of Systems:   A comprehensive review of systems was negative except for that written in the HPI. Vital Signs:    Last 24hrs VS reviewed since prior progress note.  Most recent are:  Visit Vitals  /71 (BP 1 Location: Right upper arm, BP Patient Position: Sitting) Pulse 79   Temp 97.5 °F (36.4 °C)   Resp 16   Wt 58.4 kg (128 lb 12.4 oz)   SpO2 96%   BMI 22.10 kg/m²         Intake/Output Summary (Last 24 hours) at 10/19/2022 1309  Last data filed at 10/19/2022 8421  Gross per 24 hour   Intake 200 ml   Output --   Net 200 ml          Physical Examination:     I had a face to face encounter with this patient and independently examined them on 10/19/2022 as outlined below:          Constitutional:    Eyes:  No acute distress, cooperative, pleasant       Anicteric, no injection   ENT:  Oral mucosa moist, oropharynx benign. Pulm:    Chest:  CTA bilaterally. No wheezing/rhonchi/rales. No accessory muscle use. Nonlabored breathing, nontender   CV:  Regular rhythm, normal rate, no rubs    GI:  Soft, non distended, non tender. normoactive bowel sounds   Musculoskeletal:      Skin:  No edema, warm, 2+ pulses throughout, ecchymosis and superficial skin tear of left upper extremity, dressing clean      No jaundice, no cyanosis, dry skin    Neurologic:  Moves all extremities. Awake, oriented to person       Psych: not agitated, not anxious     Data Review:    Review and/or order of clinical lab test      Labs:     No results for input(s): WBC, HGB, HCT, PLT, HGBEXT, HCTEXT, PLTEXT, HGBEXT, HCTEXT, PLTEXT in the last 72 hours.     Recent Labs     10/17/22  0315      K 4.0   *   CO2 24   BUN 30*   CREA 1.32*   GLU 78   CA 8.8   PHOS 2.8       Recent Labs     10/17/22  0315   ALB 3.0*         Lab Results   Component Value Date/Time    Cholesterol, total 138 10/17/2022 03:15 AM    HDL Cholesterol 32 10/17/2022 03:15 AM    LDL, calculated 79.8 10/17/2022 03:15 AM    Triglyceride 131 10/17/2022 03:15 AM    CHOL/HDL Ratio 4.3 10/17/2022 03:15 AM     No results found for: [facility]  Lab Results   Component Value Date/Time    Color YELLOW/STRAW 10/15/2022 06:55 PM    Appearance TURBID (A) 10/15/2022 06:55 PM    Specific gravity 1.016 10/15/2022 06:55 PM    Specific gravity 1.015 01/18/2020 11:13 AM    pH (UA) 5.5 10/15/2022 06:55 PM    Protein 30 (A) 10/15/2022 06:55 PM    Glucose Negative 10/15/2022 06:55 PM    Ketone Negative 10/15/2022 06:55 PM    Bilirubin Negative 10/15/2022 06:55 PM    Urobilinogen 0.2 10/15/2022 06:55 PM    Nitrites Negative 10/15/2022 06:55 PM    Leukocyte Esterase LARGE (A) 10/15/2022 06:55 PM    Epithelial cells FEW 10/15/2022 06:55 PM    Bacteria 3+ (A) 10/15/2022 06:55 PM    WBC >100 (H) 10/15/2022 06:55 PM    RBC 10-20 10/15/2022 06:55 PM         Medications Reviewed:     Current Facility-Administered Medications   Medication Dose Route Frequency    levothyroxine (SYNTHROID) tablet 88 mcg  88 mcg Oral ACB    sodium chloride (NS) flush 5-40 mL  5-40 mL IntraVENous Q8H    sodium chloride (NS) flush 5-40 mL  5-40 mL IntraVENous PRN    polyethylene glycol (MIRALAX) packet 17 g  17 g Oral DAILY PRN    ondansetron (ZOFRAN ODT) tablet 4 mg  4 mg Oral Q8H PRN    Or    ondansetron (ZOFRAN) injection 4 mg  4 mg IntraVENous Q6H PRN    enoxaparin (LOVENOX) injection 30 mg  30 mg SubCUTAneous DAILY    0.9% sodium chloride infusion  75 mL/hr IntraVENous CONTINUOUS    levoFLOXacin (LEVAQUIN) tablet 250 mg  250 mg Oral Q24H    hydrALAZINE (APRESOLINE) 20 mg/mL injection 10 mg  10 mg IntraVENous Q6H PRN    L.acidophilus-paracasei-S.thermophil-bifidobacter (RISAQUAD) 8 billion cell capsule  1 Capsule Oral DAILY    ezetimibe (ZETIA) tablet 10 mg  10 mg Oral DAILY    aspirin delayed-release tablet 81 mg  81 mg Oral DAILY    diclofenac (VOLTAREN) 1 % topical gel 2 g  2 g Topical DAILY    donepeziL (ARICEPT) tablet 5 mg  5 mg Oral QHS    escitalopram oxalate (LEXAPRO) tablet 20 mg  20 mg Oral DAILY    haloperidoL (HALDOL) tablet 0.5 mg  0.5 mg Oral QHS PRN    memantine (NAMENDA) tablet 10 mg  10 mg Oral BID     ______________________________________________________________________  EXPECTED LENGTH OF STAY: 3d 19h  ACTUAL LENGTH OF STAY:          4                 Pao Montoya MD

## 2022-10-19 NOTE — PROGRESS NOTES
Problem: Mobility Impaired (Adult and Pediatric)  Goal: *Acute Goals and Plan of Care (Insert Text)  Description: FUNCTIONAL STATUS PRIOR TO ADMISSION: Patient was modified independent using a rolling walker for functional mobility. HOME SUPPORT PRIOR TO ADMISSION:  Pt lived with spouse who also has dementia - has local support from daughter - has paid caregivers - 4 hrs in am and 3 hrs in pm.     Physical Therapy Goals  Initiated 10/17/2022  1. Patient will move from supine to sit and sit to supine  and scoot up and down in bed with modified independence within 7 day(s). 2.  Patient will transfer from bed to chair and chair to bed with modified independence using the least restrictive device within 7 day(s). 3.  Patient will perform sit to stand with modified independence within 7 day(s). 4.  Patient will ambulate with modified independence for > 150 feet with the least restrictive device within 7 day(s). 5.  Patient will ascend/descend 4 stairs with one handrail(s) with supervision/set-up within 7 day(s). 10/19/2022 1635 by Josue Thompson  Outcome: Progressing Towards Goal   PHYSICAL THERAPY TREATMENT  Patient: Celso Stoner (47 y.o. female)  Date: 10/19/2022  Diagnosis: UTI (urinary tract infection) [N39.0]  Ambulatory dysfunction [R26.2] <principal problem not specified>      Precautions: Fall  Chart, physical therapy assessment, plan of care and goals were reviewed. ASSESSMENT  Patient continues with skilled PT services and is progressing towards goals. Patient presents up in chair. Most of assistance required is safety, proper technique oriented. She performed sit-stand, ambulated in hallway with RW and gait belt, walked into gym, negotiated 4 steps using both rails, unilateral stepping. Without physical assistance, deviates from path, will walk away from RW, forgets to push  up from chair and forgets to reach back for stand-sit. Daughter-in-law present for session.     Current Level of Function Impacting Discharge (mobility/balance): Contact guard for all mobility, safety and technique. Other factors to consider for discharge: Dementia/Hx of Falls/Caregivers in the home/needs constant supervision for safety         PLAN :  Patient continues to benefit from skilled intervention to address the above impairments. Continue treatment per established plan of care. to address goals. Recommendation for discharge: (in order for the patient to meet his/her long term goals)  Physical therapy at least 2 days/week in the home AND ensure assist and/or supervision for safety with all ADLs and mobility. This discharge recommendation:  Has been made in collaboration with the attending provider and/or case management    IF patient discharges home will need the following DME: patient owns DME required for discharge       SUBJECTIVE:   Patient stated Ok, I'll get up.     OBJECTIVE DATA SUMMARY:   Critical Behavior:  Neurologic State: Confused, Alert  Orientation Level: Oriented to person  Cognition: Appropriate for age attention/concentration  Safety/Judgement: Decreased insight into deficits  Functional Mobility Training:  Bed Mobility:     Supine to Sit:  (presents up in chair)              Transfers:  Sit to Stand: Contact guard assistance  Stand to Sit: Contact guard assistance                             Balance:  Sitting: Intact  Standing: Intact; With support  Standing - Static: Good;Constant support  Standing - Dynamic : Fair;Constant support  Ambulation/Gait Training:  Distance (ft): 60 Feet (ft)  Assistive Device: Walker, rolling;Gait belt  Ambulation - Level of Assistance: Contact guard assistance (to guide RW and keep her on straight path)        Gait Abnormalities: Decreased step clearance; Path deviations; Shuffling gait;Trunk sway increased        Base of Support: Center of gravity altered     Speed/Sonya: Slow  Step Length: Right shortened;Left shortened        Interventions: Safety awareness training; Tactile cues; Verbal cues;Manual cues           Stairs:  Number of Stairs Trained: 4  Stairs - Level of Assistance: Contact guard assistance   Rail Use: Both      Activity Tolerance:   Fair    After treatment patient left in no apparent distress:   Sitting in chair, Call bell within reach, Bed / chair alarm activated, Caregiver / family present, and nurse notified. COMMUNICATION/COLLABORATION:   The patients plan of care was discussed with: Registered nurseKenan Ruth   Time Calculation: 21 mins

## 2022-10-19 NOTE — PROGRESS NOTES
Bedside and Verbal shift change report given to Nitish Martinez RN (oncoming nurse) by Karen Calderon RN (offgoing nurse). Report included the following information SBAR, Kardex, Intake/Output, Accordion, and Recent Results.

## 2022-10-19 NOTE — PROGRESS NOTES
10/19: Spoke with lab this afternoon to micro dept who states they will not have any more information about the urine culture until tomorrow (this is to make sure this is not vre as currently growing enterococcus)  ---->>>hoping for discharge tomorrow now

## 2022-10-20 LAB
BACTERIA SPEC CULT: ABNORMAL
BACTERIA SPEC CULT: ABNORMAL
CC UR VC: ABNORMAL
SERVICE CMNT-IMP: ABNORMAL

## 2022-10-20 PROCEDURE — 74011250636 HC RX REV CODE- 250/636: Performed by: STUDENT IN AN ORGANIZED HEALTH CARE EDUCATION/TRAINING PROGRAM

## 2022-10-20 PROCEDURE — 74011250637 HC RX REV CODE- 250/637: Performed by: INTERNAL MEDICINE

## 2022-10-20 PROCEDURE — 65270000029 HC RM PRIVATE

## 2022-10-20 PROCEDURE — 74011250637 HC RX REV CODE- 250/637: Performed by: STUDENT IN AN ORGANIZED HEALTH CARE EDUCATION/TRAINING PROGRAM

## 2022-10-20 PROCEDURE — 74011000250 HC RX REV CODE- 250: Performed by: STUDENT IN AN ORGANIZED HEALTH CARE EDUCATION/TRAINING PROGRAM

## 2022-10-20 RX ADMIN — LEVOFLOXACIN 250 MG: 250 TABLET, FILM COATED ORAL at 14:19

## 2022-10-20 RX ADMIN — LEVOTHYROXINE SODIUM 88 MCG: 0.09 TABLET ORAL at 06:47

## 2022-10-20 RX ADMIN — ASPIRIN 81 MG: 81 TABLET, COATED ORAL at 09:34

## 2022-10-20 RX ADMIN — DICLOFENAC SODIUM 2 G: 10 GEL TOPICAL at 09:33

## 2022-10-20 RX ADMIN — SODIUM CHLORIDE, PRESERVATIVE FREE 10 ML: 5 INJECTION INTRAVENOUS at 06:46

## 2022-10-20 RX ADMIN — DONEPEZIL HYDROCHLORIDE 5 MG: 5 TABLET, FILM COATED ORAL at 22:06

## 2022-10-20 RX ADMIN — SODIUM CHLORIDE, PRESERVATIVE FREE 10 ML: 5 INJECTION INTRAVENOUS at 14:20

## 2022-10-20 RX ADMIN — SODIUM CHLORIDE, PRESERVATIVE FREE 10 ML: 5 INJECTION INTRAVENOUS at 22:06

## 2022-10-20 RX ADMIN — Medication 1 CAPSULE: at 09:33

## 2022-10-20 RX ADMIN — ESCITALOPRAM OXALATE 20 MG: 10 TABLET ORAL at 09:33

## 2022-10-20 RX ADMIN — ENOXAPARIN SODIUM 30 MG: 100 INJECTION SUBCUTANEOUS at 09:33

## 2022-10-20 RX ADMIN — EZETIMIBE 10 MG: 10 TABLET ORAL at 09:33

## 2022-10-20 RX ADMIN — MEMANTINE HYDROCHLORIDE 10 MG: 10 TABLET ORAL at 07:11

## 2022-10-20 RX ADMIN — MEMANTINE HYDROCHLORIDE 10 MG: 10 TABLET ORAL at 22:06

## 2022-10-20 NOTE — PROGRESS NOTES
Bedside shift change report given to [address] (oncoming nurse) by Michelle May (offgoing nurse). Report included the following information SBAR, Kardex, Procedure Summary, Intake/Output, MAR, and Recent Results.

## 2022-10-20 NOTE — PROGRESS NOTES
[facility] Adult  Hospitalist Group                                                                                          Hospitalist Progress Note  Pamella Chicas MD  Answering service: [phone] OR [phone] from in house phone        Date of Service:  10/20/2022  NAME:  Josue Mirza  :  1935  MRN:  [id number]      Admission Summary:   Excerpt of HPI: \"--Mary Hunt is a 81 y.o. female `with hx of htn, hypothyroidism, breast cancer, cva, ckd iii-iv, dementia who presents to hospital for ams. Family at bedside states patient has dementia more stable/known baseline. She was noted to be increasingly confused over the last 4 days. Family bedside states this typically occurs with urinary tract infections. She was brought to ED to evaluate for acute cystitis. Patient is seen and examined at bedside. She is conversant and has no acute complaints or concerns. The patient denies any fever, chills, chest or abdominal pain, nausea, vomiting, cough, congestion, recent illness, palpitations, or dysuria. --\"       Interval history / Subjective:   Urine culture still pending, I called the lab to find out. Preliminary culture is showing Enterococcus. Assessment & Plan: Altered mental status, delirium  Underlying Alzheimer's dementia;  -in setting of recurrent UTI;       UTI, acute cystitis by UA  -hx of enterococcus uti sensitive to levaquin  ---->>>presumptive enterococcus at this time, pending sensitivity;     Mild renal insufficiency, in setting of poor po intake  - improved with IVFs;      Hx of CVA, intolerant to statins in the past?  -on aspirin  -start zetia 10/16/22  -lipid panel     dementia and delirium  -has been on prn haldol prior to admission  -resume home meds  -redirect as needed  -supportive measures  -monitor    Depression  -does not voice si/hi/hallucinations  -resume med    Mild bradycardia, resolved  -resume telemetry      Hx of hypothyroidism  -on replacement  -tsh Caller would like to discuss an/a Appointment for Procedure. Writer advised caller of callback within 24-72 hours.    Patient Name: Uday Rosen  Caller Name: Uday  Name of Facility:   Callback Number: [phone]  Best Availability:   Can A Detailed Message Be left?    Fax Number:   Additional Info: Patient is calling because he would like to discuss an upcoming procedure with the staff. Please advise.  Did you confirm the message with the caller?: yes    Thank you,  Claudia Ma     level slightly out of range, increased levothyroxine dose and will need repeat tsh level in 4-6 weeks. 10/16/2022:  S/p fall, hospitalist examined patient in presence of patient's son nurse which the nurse had already placed dressing over left upper extremity  -resume daily dressing change/assessment          Code status: full as per admission   Prophylaxis: lovenox  Care Plan discussed with: patient/daugher in law  Anticipated Disposition:  SNF, anticipate tomorrow, pending urine culture; Hospital Problems  Date Reviewed: 7/4/2022            Codes Class Noted POA    Ambulatory dysfunction ICD-10-CM: R26.2  ICD-9-CM: 719.7  10/15/2022 Unknown        UTI (urinary tract infection) ICD-10-CM: N39.0  ICD-9-CM: 599.0  7/19/2018 Unknown       Review of Systems:   A comprehensive review of systems was negative except for that written in the HPI. Vital Signs:    Last 24hrs VS reviewed since prior progress note. Most recent are:  Visit Vitals  /66   Pulse 60   Temp 97.7 °F (36.5 °C)   Resp 16   Wt 58.4 kg (128 lb 12.4 oz)   SpO2 94%   BMI 22.10 kg/m²         Intake/Output Summary (Last 24 hours) at 10/20/2022 1946  Last data filed at 10/20/2022 1269  Gross per 24 hour   Intake 240 ml   Output --   Net 240 ml          Physical Examination:     I had a face to face encounter with this patient and independently examined them on 10/20/2022 as outlined below:          Constitutional:    Eyes:  No acute distress, cooperative, pleasant       Anicteric, no injection   ENT:  Oral mucosa moist, oropharynx benign. Pulm:    Chest:  CTA bilaterally. No wheezing/rhonchi/rales. No accessory muscle use. Nonlabored breathing, nontender   CV:  Regular rhythm, normal rate, no rubs    GI:  Soft, non distended, non tender.  normoactive bowel sounds   Musculoskeletal:      Skin:  No edema, warm, 2+ pulses throughout, ecchymosis and superficial skin tear of left upper extremity, dressing clean      No jaundice, no cyanosis, dry skin    Neurologic:  Moves all extremities. Awake, oriented to person       Psych: not agitated, not anxious     Data Review:    Review and/or order of clinical lab test      Labs:     No results for input(s): WBC, HGB, HCT, PLT, HGBEXT, HCTEXT, PLTEXT, HGBEXT, HCTEXT, PLTEXT in the last 72 hours. No results for input(s): NA, K, CL, CO2, BUN, CREA, GLU, CA, MG, PHOS, URICA in the last 72 hours. No results for input(s): ALT, AP, TBIL, TBILI, TP, ALB, GLOB, GGT, AML, LPSE in the last 72 hours.     No lab exists for component: SGOT, GPT, AMYP, HLPSE      Lab Results   Component Value Date/Time    Cholesterol, total 138 10/17/2022 03:15 AM    HDL Cholesterol 32 10/17/2022 03:15 AM    LDL, calculated 79.8 10/17/2022 03:15 AM    Triglyceride 131 10/17/2022 03:15 AM    CHOL/HDL Ratio 4.3 10/17/2022 03:15 AM     No results found for: [facility]  Lab Results   Component Value Date/Time    Color YELLOW/STRAW 10/15/2022 06:55 PM    Appearance TURBID (A) 10/15/2022 06:55 PM    Specific gravity 1.016 10/15/2022 06:55 PM    Specific gravity 1.015 01/18/2020 11:13 AM    pH (UA) 5.5 10/15/2022 06:55 PM    Protein 30 (A) 10/15/2022 06:55 PM    Glucose Negative 10/15/2022 06:55 PM    Ketone Negative 10/15/2022 06:55 PM    Bilirubin Negative 10/15/2022 06:55 PM    Urobilinogen 0.2 10/15/2022 06:55 PM    Nitrites Negative 10/15/2022 06:55 PM    Leukocyte Esterase LARGE (A) 10/15/2022 06:55 PM    Epithelial cells FEW 10/15/2022 06:55 PM    Bacteria 3+ (A) 10/15/2022 06:55 PM    WBC >100 (H) 10/15/2022 06:55 PM    RBC 10-20 10/15/2022 06:55 PM         Medications Reviewed:     Current Facility-Administered Medications   Medication Dose Route Frequency    levothyroxine (SYNTHROID) tablet 88 mcg  88 mcg Oral ACB    sodium chloride (NS) flush 5-40 mL  5-40 mL IntraVENous Q8H    sodium chloride (NS) flush 5-40 mL  5-40 mL IntraVENous PRN    polyethylene glycol (MIRALAX) packet 17 g  17 g Oral DAILY PRN    ondansetron (Mallorie Jane ODT) tablet 4 mg  4 mg Oral Q8H PRN    Or    ondansetron (ZOFRAN) injection 4 mg  4 mg IntraVENous Q6H PRN    enoxaparin (LOVENOX) injection 30 mg  30 mg SubCUTAneous DAILY    levoFLOXacin (LEVAQUIN) tablet 250 mg  250 mg Oral Q24H    hydrALAZINE (APRESOLINE) 20 mg/mL injection 10 mg  10 mg IntraVENous Q6H PRN    L.acidophilus-paracasei-S.thermophil-bifidobacter (RISAQUAD) 8 billion cell capsule  1 Capsule Oral DAILY    ezetimibe (ZETIA) tablet 10 mg  10 mg Oral DAILY    aspirin delayed-release tablet 81 mg  81 mg Oral DAILY    diclofenac (VOLTAREN) 1 % topical gel 2 g  2 g Topical DAILY    donepeziL (ARICEPT) tablet 5 mg  5 mg Oral QHS    escitalopram oxalate (LEXAPRO) tablet 20 mg  20 mg Oral DAILY    haloperidoL (HALDOL) tablet 0.5 mg  0.5 mg Oral QHS PRN    memantine (NAMENDA) tablet 10 mg  10 mg Oral BID     ______________________________________________________________________  EXPECTED LENGTH OF STAY: 3d 19h  ACTUAL LENGTH OF STAY:          5                 Megan Leon MD

## 2022-10-21 VITALS
SYSTOLIC BLOOD PRESSURE: 131 MMHG | DIASTOLIC BLOOD PRESSURE: 69 MMHG | OXYGEN SATURATION: 91 % | RESPIRATION RATE: 16 BRPM | WEIGHT: 128.77 LBS | TEMPERATURE: 98.4 F | HEART RATE: 61 BPM | BODY MASS INDEX: 22.1 KG/M2

## 2022-10-21 PROCEDURE — 97530 THERAPEUTIC ACTIVITIES: CPT

## 2022-10-21 PROCEDURE — 74011250637 HC RX REV CODE- 250/637: Performed by: STUDENT IN AN ORGANIZED HEALTH CARE EDUCATION/TRAINING PROGRAM

## 2022-10-21 PROCEDURE — 74011250636 HC RX REV CODE- 250/636: Performed by: STUDENT IN AN ORGANIZED HEALTH CARE EDUCATION/TRAINING PROGRAM

## 2022-10-21 PROCEDURE — 74011250637 HC RX REV CODE- 250/637: Performed by: INTERNAL MEDICINE

## 2022-10-21 PROCEDURE — 97116 GAIT TRAINING THERAPY: CPT

## 2022-10-21 RX ORDER — HALOPERIDOL 0.5 MG/1
0.5 TABLET ORAL
Qty: 30 TABLET | Refills: 0 | Status: SHIPPED | OUTPATIENT
Start: 2022-10-23

## 2022-10-21 RX ORDER — HALOPERIDOL 0.5 MG/1
0.5 TABLET ORAL
Qty: 30 TABLET | Refills: 0 | Status: SHIPPED | OUTPATIENT
Start: 2022-10-21 | End: 2022-10-21 | Stop reason: SDUPTHER

## 2022-10-21 RX ORDER — EZETIMIBE 10 MG/1
10 TABLET ORAL DAILY
Qty: 30 TABLET | Refills: 0 | Status: SHIPPED | OUTPATIENT
Start: 2022-10-22 | End: 2022-11-21

## 2022-10-21 RX ORDER — LEVOFLOXACIN 250 MG/1
250 TABLET ORAL EVERY 24 HOURS
Qty: 5 TABLET | Refills: 0 | Status: SHIPPED | OUTPATIENT
Start: 2022-10-21 | End: 2022-10-26

## 2022-10-21 RX ORDER — LEVOTHYROXINE SODIUM 88 UG/1
88 TABLET ORAL
Qty: 30 TABLET | Refills: 0 | Status: SHIPPED | OUTPATIENT
Start: 2022-10-22 | End: 2022-11-21

## 2022-10-21 RX ADMIN — ASPIRIN 81 MG: 81 TABLET, COATED ORAL at 09:58

## 2022-10-21 RX ADMIN — MEMANTINE HYDROCHLORIDE 10 MG: 10 TABLET ORAL at 07:31

## 2022-10-21 RX ADMIN — LEVOTHYROXINE SODIUM 88 MCG: 0.09 TABLET ORAL at 07:31

## 2022-10-21 RX ADMIN — Medication 1 CAPSULE: at 09:58

## 2022-10-21 RX ADMIN — EZETIMIBE 10 MG: 10 TABLET ORAL at 09:58

## 2022-10-21 RX ADMIN — LEVOFLOXACIN 250 MG: 250 TABLET, FILM COATED ORAL at 15:19

## 2022-10-21 RX ADMIN — ESCITALOPRAM OXALATE 20 MG: 10 TABLET ORAL at 09:58

## 2022-10-21 RX ADMIN — ENOXAPARIN SODIUM 30 MG: 100 INJECTION SUBCUTANEOUS at 09:58

## 2022-10-21 NOTE — PROGRESS NOTES
Problem: Mobility Impaired (Adult and Pediatric)  Goal: *Acute Goals and Plan of Care (Insert Text)  Description: FUNCTIONAL STATUS PRIOR TO ADMISSION: Patient was modified independent using a rolling walker for functional mobility. HOME SUPPORT PRIOR TO ADMISSION:  Pt lived with spouse who also has dementia - has local support from daughter - has paid caregivers - 4 hrs in am and 3 hrs in pm.     Physical Therapy Goals  Initiated 10/17/2022  1. Patient will move from supine to sit and sit to supine  and scoot up and down in bed with modified independence within 7 day(s). 2.  Patient will transfer from bed to chair and chair to bed with modified independence using the least restrictive device within 7 day(s). 3.  Patient will perform sit to stand with modified independence within 7 day(s). 4.  Patient will ambulate with modified independence for > 150 feet with the least restrictive device within 7 day(s). 5.  Patient will ascend/descend 4 stairs with one handrail(s) with supervision/set-up within 7 day(s). Outcome: Progressing Towards Goal   PHYSICAL THERAPY TREATMENT  Patient: Darryn Lindsey (19 y.o. female)  Date: 10/21/2022  Diagnosis: UTI (urinary tract infection) [N39.0]  Ambulatory dysfunction [R26.2] <principal problem not specified>      Precautions: Fall  Chart, physical therapy assessment, plan of care and goals were reviewed. ASSESSMENT  Patient continues with skilled PT services. She is received in chair . Pt requesting to use bathroom and then amb into hallway. Pt amb approx 80 Ft this session. Noted some improvement in RW management, however still requires mild verbal cuing to avoid obstacles in hallway and to keep RW close when transitioning from sit>stand. Competed set of BLE exercises w/ light manual resistance for hip ADD. Current Level of Function Impacting Discharge (mobility/balance): CGA    Other factors to consider for discharge: Urine culture pending (?).           PLAN :  Patient continues to benefit from skilled intervention to address the above impairments. Continue treatment per established plan of care. to address goals. Recommendation for discharge: (in order for the patient to meet his/her long term goals)  Physical therapy at least 2 days/week in the home AND ensure assist and/or supervision for safety. This discharge recommendation:  Has been made in collaboration with the attending provider and/or case management    IF patient discharges home will need the following DME: patient owns DME required for discharge       SUBJECTIVE:   Patient stated I'd have to use the bathroom.     OBJECTIVE DATA SUMMARY:   Critical Behavior:  Neurologic State: Alert, Confused  Orientation Level: Oriented to person, Oriented to place  Cognition: Appropriate for age attention/concentration  Safety/Judgement: Decreased insight into deficits  Functional Mobility Training:  Bed Mobility:     Supine to Sit:  (received OOB in chair)  Sit to Supine:  (returned to chair)           Transfers:  Sit to Stand: Contact guard assistance;Assist x1  Stand to Sit: Contact guard assistance;Assist x1                             Balance:  Sitting: Intact  Standing: Intact; Without support  Standing - Static: Constant support;Good  Standing - Dynamic : Constant support;Good;Fair  Ambulation/Gait Training:  Distance (ft): 80 Feet (ft)  Assistive Device: Gait belt;Walker, rolling  Ambulation - Level of Assistance: Contact guard assistance;Assist x1                 Base of Support: Center of gravity altered;Narrowed     Speed/Sonya: Slow  Step Length: Right shortened;Left shortened             Therapeutic Exercises:   Seated: hip flexion (B), LAQ's, DF/PF, hip abd w/ light manual resistance, and hip add (pillow squeeze).    Pain Rating:  No c/o pain    Activity Tolerance:   Good    After treatment patient left in no apparent distress:   Sitting in chair, Call bell within reach, and Bed / chair alarm activated    COMMUNICATION/COLLABORATION:   The patients plan of care was discussed with: Registered nurse.      Sally Paredes,PTA   Time Calculation: 25 mins

## 2022-10-21 NOTE — PROGRESS NOTES
I have reviewed discharge instructions with the patient. The patient verbalized understanding. Pt provided with discharge instructions. No further questions at this time.

## 2022-10-21 NOTE — DISCHARGE SUMMARY
Physician Discharge Summary     Patient ID:    Mary Miller  [id number]  : 1935    Admit date: 10/15/2022    Discharge date and time: 10/21/2022    Hospital Diagnoses and Treatment Rendered:   HPI:  Mary Miller is a 81 y.o. female with a PMHx of HTN, hypothyroidism, breast cancer, CVA, CKD Stage IIIB-IV, dementia, who presented in the ED with chief complaint of increased confusion for the past 4 days. HOSPITAL COURSE:   Acute non-hemorrhagic cystitis;   - Urine cx: Enterococcus, susceptible to Levaquin;   - complete the course of treatment with Levaquin;     Acute metabolic encephalopathy:  - 2/2 UTI;  - underlying Alzheimer's dementia;  - resolved, mentation at baseline at the time of discharge;     SHERINE:  - improved with IVFs; Hx of CVA, intolerant to statins in the past?  - continue home medications: aspirin, Zetia;     Depression  - continue home medications;    Mild bradycardia, resolved  - avoid rate-controlling medications  - could be related to hypothyroidism, dose of levothyroxine increased; Hypothyroidism  - continue Levothyroxine;  - TSH elevated at 5.21, dose of Levothyroxine was increased to 88 mcg; repeat TSH in 4-6 weeks;        Chronic Diagnoses:    Problem List as of 10/21/2022 Date Reviewed: 2022            Codes Class Noted - Resolved    Ambulatory dysfunction ICD-10-CM: R26.2  ICD-9-CM: 719.7  10/15/2022 - Present        Altered mental status ICD-10-CM: R41.82  ICD-9-CM: 780.97  7/3/2022 - Present        COVID-19 ICD-10-CM: U07.1  ICD-9-CM: 079.89  7/3/2022 - Present        Hypothyroidism ICD-10-CM: E03.9  ICD-9-CM: 244.9  2020 - Present        Hyperlipidemia ICD-10-CM: E78.5  ICD-9-CM: 272.4  2020 - Present        Expressive aphasia ICD-10-CM: R47.01  ICD-9-CM: 784.3  2020 - Present        Stroke (cerebrum) ([facility] [address] ICD-10-CM: I63.9  ICD-9-CM: 434.91  2020 - Present        UTI (urinary tract infection) ICD-10-CM: N39.0  ICD-9-CM: 599.0  7/19/2018 - Present           Discharge Medications:   Discharge Medication List as of 10/21/2022  3:29 PM        START taking these medications    Details   ezetimibe (ZETIA) 10 mg tablet Take 1 Tablet by mouth daily for 30 days. , Normal, Disp-30 Tablet, R-0      levoFLOXacin (LEVAQUIN) 250 mg tablet Take 1 Tablet by mouth every twenty-four (24) hours for 5 doses. , Normal, Disp-5 Tablet, R-0           CONTINUE these medications which have CHANGED    Details   levothyroxine (SYNTHROID) 88 mcg tablet Take 1 Tablet by mouth Daily (before breakfast) for 30 days. , Normal, Disp-30 Tablet, R-0      haloperidoL (HALDOL) 0.5 mg tablet Take 1 Tablet by mouth nightly as needed for PRN Reason (Other) (Agitation;)., Normal, Disp-30 Tablet, R-0Please do not take while on Levaquin;           CONTINUE these medications which have NOT CHANGED    Details   diclofenac (VOLTAREN) 1 % gel Apply 2 g to affected area daily. to knees for arthritic pain, Historical Med      aspirin delayed-release 81 mg tablet Take 81 mg by mouth daily. , Historical Med      acetaminophen (TYLENOL) 500 mg tablet Take 500 mg by mouth every six (6) hours as needed for Pain., Historical Med      donepeziL (ARICEPT) 5 mg tablet Take 5 mg by mouth nightly., Historical Med      memantine ER (NAMENDA XR) 28 mg capsule Take 28 mg by mouth daily. , Historical Med      cranberry 500 mg capsule Take 500 mg by mouth daily. , Historical Med      multivit-min/iron/folic/lutein (CENTRUM SILVER WOMEN PO) Take 1,500 mg by mouth daily. , Historical Med      escitalopram oxalate (LEXAPRO) 20 mg tablet Take 20 mg by mouth daily. , Historical Med           STOP taking these medications       melatonin 3 mg tablet Comments:   Reason for Stopping: Follow up Care:    1. Brigette Tucker MD in 1-2 weeks. Please call to set up an appointment shortly after discharge.       Diet:  Regular Diet and Resume previous diet    Disposition:  Home. Advanced Directive:   FULL    DNR      Discharge Exam:      Constitutional:    Eyes:  No acute distress, cooperative, pleasant       Anicteric, EOMi;   ENT:  Oral mucosa moist, oropharynx benign. Pulm:    Chest:  CTA bilaterally. No wheezing/rhonchi/rales. No accessory muscle use. Nonlabored breathing, nontender   CV:  Regular rhythm, normal rate, no rubs    GI:  Soft, non distended, non tender. normoactive bowel sounds   Musculoskeletal:      Skin:  No edema, warm, 2+ pulses throughout, ecchymosis and superficial skin tear of left upper extremity, dressing clean      No jaundice, no cyanosis, dry skin    Neurologic:  Moves all extremities. Awake, oriented to person       Psych: not agitated, not anxious       CONSULTATIONS: None    Significant Diagnostic Studies:   10/15/2022: BUN 34 MG/DL (H; Ref range: 6 - 20 MG/DL); Calcium 9.6 MG/DL (Ref range: 8.5 - 10.1 MG/DL); CO2 30 mmol/L (Ref range: 21 - 32 mmol/L); Creatinine 1.35 MG/DL (H; Ref range: 0.55 - 1.02 MG/DL); Glucose 123 mg/dL (H; Ref range: 65 - 100 mg/dL); HCT 40.9 % (Ref range: 35.0 - 47.0 %); HGB 13.6 g/dL (Ref range: 11.5 - 16.0 g/dL); Potassium 4.5 mmol/L (Ref range: 3.5 - 5.1 mmol/L); Sodium 140 mmol/L (Ref range: 136 - 145 mmol/L)  No results for input(s): WBC, HGB, HCT, PLT, HGBEXT, HCTEXT, PLTEXT in the last 72 hours. No results for input(s): NA, K, CL, CO2, BUN, CREA, GLU, CA, MG, PHOS, URICA in the last 72 hours. No results for input(s): AP, TBIL, TP, ALB, GLOB, GGT, AML, LPSE in the last 72 hours. No lab exists for component: SGOT, GPT, AMYP, HLPSE  No results for input(s): INR, PTP, APTT, INREXT in the last 72 hours. No results for input(s): FE, TIBC, PSAT, FERR in the last 72 hours. No results for input(s): PH, PCO2, PO2 in the last 72 hours. No results for input(s): CPK, CKMB in the last 72 hours.     No lab exists for component: TROPONINI  No components found for: [place]      Discharge Time: > 35 minutes;     Signed:  Arnulfo Medrano MD  10/21/2022  2:55 PM

## 2022-10-21 NOTE — PROGRESS NOTES
RUR 13 %     Transition of Care- Home with home health PT, SN for wound care and Private Care givers- [facility] has accepted patient. The patient owns necessary DME for home. The daughter in law Merced Andres will be here around 4 pm to transport patient home     [place] givers for patient and her spouse- 8:30 am-12:30 pm and 5:30 pm to 8:30 pm. Patient is modified independent at base line using a RW and or Cane. Decision Makers/Son's- Champ Galarza- [phone] and Lisa Harper- [phone]. Per RN the Daughter-in law Merced Andres #[phone]    CM notified Kole Albrecht , liaison with [facility] of order added for SN for wound care. CM spoke with the daughter in law, Merced Andres. She plans to be here around 4 PM.    CM called Kole Albrecht with [facility] and notified of discharge today. Medicare pt has received, reviewed, and signed 2nd IM letter informing them of their right to appeal the discharge. Signed copy has been placed on pt bedside chart.       Nabila Downs, BSW/CRM

## 2022-10-21 NOTE — PROGRESS NOTES
Bedside and Verbal shift change report given to Mariya Thomason RN (oncoming nurse) by Keyona Rousseau RN (offgoing nurse). Report included the following information SBAR, Kardex, Intake/Output, MAR, Accordion, and Recent Results.

## 2022-10-23 ENCOUNTER — HOME CARE VISIT (OUTPATIENT)
Dept: SCHEDULING | Facility: HOME HEALTH | Age: 87
End: 2022-10-23
Payer: MEDICARE

## 2022-10-23 PROCEDURE — 3331090001 HH PPS REVENUE CREDIT

## 2022-10-23 PROCEDURE — G0299 HHS/HOSPICE OF RN EA 15 MIN: HCPCS

## 2022-10-23 PROCEDURE — 3331090002 HH PPS REVENUE DEBIT

## 2022-10-23 PROCEDURE — 400013 HH SOC

## 2022-10-23 PROCEDURE — 400018 HH-NO PAY CLAIM PROCEDURE

## 2022-10-24 PROCEDURE — 3331090001 HH PPS REVENUE CREDIT

## 2022-10-24 PROCEDURE — 3331090002 HH PPS REVENUE DEBIT

## 2022-10-25 ENCOUNTER — HOME CARE VISIT (OUTPATIENT)
Dept: SCHEDULING | Facility: HOME HEALTH | Age: 87
End: 2022-10-25
Payer: MEDICARE

## 2022-10-25 VITALS
RESPIRATION RATE: 16 BRPM | HEART RATE: 96 BPM | OXYGEN SATURATION: 97 % | TEMPERATURE: 98.6 F | DIASTOLIC BLOOD PRESSURE: 76 MMHG | SYSTOLIC BLOOD PRESSURE: 122 MMHG

## 2022-10-25 VITALS
OXYGEN SATURATION: 96 % | RESPIRATION RATE: 16 BRPM | HEART RATE: 74 BPM | SYSTOLIC BLOOD PRESSURE: 132 MMHG | DIASTOLIC BLOOD PRESSURE: 76 MMHG | TEMPERATURE: 98.5 F

## 2022-10-25 PROCEDURE — 3331090002 HH PPS REVENUE DEBIT

## 2022-10-25 PROCEDURE — 3331090001 HH PPS REVENUE CREDIT

## 2022-10-25 PROCEDURE — G0151 HHCP-SERV OF PT,EA 15 MIN: HCPCS

## 2022-10-26 ENCOUNTER — HOME CARE VISIT (OUTPATIENT)
Dept: SCHEDULING | Facility: HOME HEALTH | Age: 87
End: 2022-10-26
Payer: MEDICARE

## 2022-10-26 VITALS
SYSTOLIC BLOOD PRESSURE: 120 MMHG | BODY MASS INDEX: 25.23 KG/M2 | RESPIRATION RATE: 16 BRPM | DIASTOLIC BLOOD PRESSURE: 72 MMHG | WEIGHT: 147 LBS | OXYGEN SATURATION: 98 % | TEMPERATURE: 98.4 F | HEART RATE: 68 BPM

## 2022-10-26 PROCEDURE — 3331090002 HH PPS REVENUE DEBIT

## 2022-10-26 PROCEDURE — G0300 HHS/HOSPICE OF LPN EA 15 MIN: HCPCS

## 2022-10-26 PROCEDURE — 3331090001 HH PPS REVENUE CREDIT

## 2022-10-27 ENCOUNTER — HOME CARE VISIT (OUTPATIENT)
Dept: SCHEDULING | Facility: HOME HEALTH | Age: 87
End: 2022-10-27
Payer: MEDICARE

## 2022-10-27 VITALS
SYSTOLIC BLOOD PRESSURE: 122 MMHG | OXYGEN SATURATION: 95 % | DIASTOLIC BLOOD PRESSURE: 78 MMHG | TEMPERATURE: 98.4 F | HEART RATE: 65 BPM | RESPIRATION RATE: 16 BRPM

## 2022-10-27 PROCEDURE — G0151 HHCP-SERV OF PT,EA 15 MIN: HCPCS

## 2022-10-27 PROCEDURE — 3331090001 HH PPS REVENUE CREDIT

## 2022-10-27 PROCEDURE — 3331090002 HH PPS REVENUE DEBIT

## 2022-10-28 ENCOUNTER — HOME CARE VISIT (OUTPATIENT)
Dept: HOME HEALTH SERVICES | Facility: HOME HEALTH | Age: 87
End: 2022-10-28
Payer: MEDICARE

## 2022-10-28 PROCEDURE — 3331090002 HH PPS REVENUE DEBIT

## 2022-10-28 PROCEDURE — 3331090001 HH PPS REVENUE CREDIT

## 2022-10-29 PROCEDURE — 3331090002 HH PPS REVENUE DEBIT

## 2022-10-29 PROCEDURE — 3331090001 HH PPS REVENUE CREDIT

## 2022-10-30 PROCEDURE — 3331090002 HH PPS REVENUE DEBIT

## 2022-10-30 PROCEDURE — 3331090001 HH PPS REVENUE CREDIT

## 2022-10-31 ENCOUNTER — HOME CARE VISIT (OUTPATIENT)
Dept: SCHEDULING | Facility: HOME HEALTH | Age: 87
End: 2022-10-31
Payer: MEDICARE

## 2022-10-31 ENCOUNTER — HOME CARE VISIT (OUTPATIENT)
Dept: HOME HEALTH SERVICES | Facility: HOME HEALTH | Age: 87
End: 2022-10-31
Payer: MEDICARE

## 2022-10-31 VITALS
TEMPERATURE: 98.8 F | RESPIRATION RATE: 16 BRPM | OXYGEN SATURATION: 100 % | HEART RATE: 65 BPM | DIASTOLIC BLOOD PRESSURE: 68 MMHG | SYSTOLIC BLOOD PRESSURE: 118 MMHG

## 2022-10-31 PROCEDURE — 3331090002 HH PPS REVENUE DEBIT

## 2022-10-31 PROCEDURE — G0151 HHCP-SERV OF PT,EA 15 MIN: HCPCS

## 2022-10-31 PROCEDURE — 3331090001 HH PPS REVENUE CREDIT

## 2022-11-01 PROCEDURE — 3331090002 HH PPS REVENUE DEBIT

## 2022-11-01 PROCEDURE — 3331090001 HH PPS REVENUE CREDIT

## 2022-11-02 ENCOUNTER — HOME CARE VISIT (OUTPATIENT)
Dept: SCHEDULING | Facility: HOME HEALTH | Age: 87
End: 2022-11-02
Payer: MEDICARE

## 2022-11-02 VITALS
TEMPERATURE: 98.3 F | RESPIRATION RATE: 16 BRPM | OXYGEN SATURATION: 97 % | SYSTOLIC BLOOD PRESSURE: 118 MMHG | DIASTOLIC BLOOD PRESSURE: 72 MMHG | HEART RATE: 80 BPM

## 2022-11-02 PROCEDURE — G0151 HHCP-SERV OF PT,EA 15 MIN: HCPCS

## 2022-11-02 PROCEDURE — 3331090001 HH PPS REVENUE CREDIT

## 2022-11-02 PROCEDURE — 3331090002 HH PPS REVENUE DEBIT

## 2022-11-03 PROCEDURE — 3331090002 HH PPS REVENUE DEBIT

## 2022-11-03 PROCEDURE — 3331090001 HH PPS REVENUE CREDIT

## 2022-11-04 PROCEDURE — 3331090002 HH PPS REVENUE DEBIT

## 2022-11-04 PROCEDURE — 3331090001 HH PPS REVENUE CREDIT

## 2022-11-05 PROCEDURE — 3331090001 HH PPS REVENUE CREDIT

## 2022-11-05 PROCEDURE — 3331090002 HH PPS REVENUE DEBIT

## 2022-11-06 PROCEDURE — 3331090002 HH PPS REVENUE DEBIT

## 2022-11-06 PROCEDURE — 3331090001 HH PPS REVENUE CREDIT

## 2022-11-07 ENCOUNTER — HOME CARE VISIT (OUTPATIENT)
Dept: SCHEDULING | Facility: HOME HEALTH | Age: 87
End: 2022-11-07
Payer: MEDICARE

## 2022-11-07 VITALS
TEMPERATURE: 98.4 F | DIASTOLIC BLOOD PRESSURE: 62 MMHG | HEART RATE: 60 BPM | RESPIRATION RATE: 16 BRPM | OXYGEN SATURATION: 97 % | SYSTOLIC BLOOD PRESSURE: 116 MMHG

## 2022-11-07 PROCEDURE — G0151 HHCP-SERV OF PT,EA 15 MIN: HCPCS

## 2022-11-07 PROCEDURE — 3331090001 HH PPS REVENUE CREDIT

## 2022-11-07 PROCEDURE — 3331090002 HH PPS REVENUE DEBIT

## 2022-11-08 PROCEDURE — 3331090001 HH PPS REVENUE CREDIT

## 2022-11-08 PROCEDURE — 3331090002 HH PPS REVENUE DEBIT

## 2022-11-09 ENCOUNTER — HOME CARE VISIT (OUTPATIENT)
Dept: SCHEDULING | Facility: HOME HEALTH | Age: 87
End: 2022-11-09
Payer: MEDICARE

## 2022-11-09 VITALS
DIASTOLIC BLOOD PRESSURE: 80 MMHG | HEART RATE: 69 BPM | TEMPERATURE: 98.9 F | RESPIRATION RATE: 16 BRPM | SYSTOLIC BLOOD PRESSURE: 126 MMHG | OXYGEN SATURATION: 97 %

## 2022-11-09 PROCEDURE — 3331090001 HH PPS REVENUE CREDIT

## 2022-11-09 PROCEDURE — G0151 HHCP-SERV OF PT,EA 15 MIN: HCPCS

## 2022-11-09 PROCEDURE — 3331090002 HH PPS REVENUE DEBIT

## 2022-11-10 PROCEDURE — 3331090001 HH PPS REVENUE CREDIT

## 2022-11-10 PROCEDURE — 3331090002 HH PPS REVENUE DEBIT

## 2022-11-11 PROCEDURE — 3331090001 HH PPS REVENUE CREDIT

## 2022-11-11 PROCEDURE — 3331090002 HH PPS REVENUE DEBIT

## 2022-11-12 PROCEDURE — 3331090002 HH PPS REVENUE DEBIT

## 2022-11-12 PROCEDURE — 3331090001 HH PPS REVENUE CREDIT

## 2022-11-13 PROCEDURE — 3331090001 HH PPS REVENUE CREDIT

## 2022-11-13 PROCEDURE — 3331090002 HH PPS REVENUE DEBIT

## 2022-11-14 ENCOUNTER — HOME CARE VISIT (OUTPATIENT)
Dept: SCHEDULING | Facility: HOME HEALTH | Age: 87
End: 2022-11-14
Payer: MEDICARE

## 2022-11-14 VITALS
HEART RATE: 60 BPM | OXYGEN SATURATION: 96 % | DIASTOLIC BLOOD PRESSURE: 70 MMHG | RESPIRATION RATE: 16 BRPM | TEMPERATURE: 98.1 F | SYSTOLIC BLOOD PRESSURE: 116 MMHG

## 2022-11-14 PROBLEM — N39.0 UTI (URINARY TRACT INFECTION): Status: RESOLVED | Noted: 2018-07-19 | Resolved: 2022-11-14

## 2022-11-14 PROCEDURE — 3331090002 HH PPS REVENUE DEBIT

## 2022-11-14 PROCEDURE — 3331090001 HH PPS REVENUE CREDIT

## 2022-11-14 PROCEDURE — G0151 HHCP-SERV OF PT,EA 15 MIN: HCPCS

## 2022-11-15 ENCOUNTER — HOME CARE VISIT (OUTPATIENT)
Dept: HOME HEALTH SERVICES | Facility: HOME HEALTH | Age: 87
End: 2022-11-15
Payer: MEDICARE

## 2022-11-15 PROCEDURE — 3331090002 HH PPS REVENUE DEBIT

## 2022-11-15 PROCEDURE — 3331090001 HH PPS REVENUE CREDIT

## 2022-11-16 ENCOUNTER — HOME CARE VISIT (OUTPATIENT)
Dept: SCHEDULING | Facility: HOME HEALTH | Age: 87
End: 2022-11-16
Payer: MEDICARE

## 2022-11-16 VITALS
TEMPERATURE: 98.1 F | DIASTOLIC BLOOD PRESSURE: 68 MMHG | HEART RATE: 61 BPM | RESPIRATION RATE: 16 BRPM | SYSTOLIC BLOOD PRESSURE: 120 MMHG | OXYGEN SATURATION: 99 %

## 2022-11-16 PROCEDURE — 3331090002 HH PPS REVENUE DEBIT

## 2022-11-16 PROCEDURE — G0151 HHCP-SERV OF PT,EA 15 MIN: HCPCS

## 2022-11-16 PROCEDURE — 3331090001 HH PPS REVENUE CREDIT

## 2022-11-17 PROCEDURE — 3331090002 HH PPS REVENUE DEBIT

## 2022-11-17 PROCEDURE — 3331090001 HH PPS REVENUE CREDIT

## 2022-11-18 PROCEDURE — 3331090001 HH PPS REVENUE CREDIT

## 2022-11-18 PROCEDURE — 3331090002 HH PPS REVENUE DEBIT

## 2022-11-19 PROCEDURE — 3331090001 HH PPS REVENUE CREDIT

## 2022-11-19 PROCEDURE — 3331090002 HH PPS REVENUE DEBIT

## 2022-11-20 PROCEDURE — 3331090001 HH PPS REVENUE CREDIT

## 2022-11-20 PROCEDURE — 3331090002 HH PPS REVENUE DEBIT

## 2022-11-21 ENCOUNTER — HOME CARE VISIT (OUTPATIENT)
Dept: SCHEDULING | Facility: HOME HEALTH | Age: 87
End: 2022-11-21
Payer: MEDICARE

## 2022-11-21 VITALS
HEART RATE: 79 BPM | DIASTOLIC BLOOD PRESSURE: 64 MMHG | RESPIRATION RATE: 16 BRPM | OXYGEN SATURATION: 92 % | SYSTOLIC BLOOD PRESSURE: 118 MMHG | TEMPERATURE: 98.9 F

## 2022-11-21 PROCEDURE — 3331090001 HH PPS REVENUE CREDIT

## 2022-11-21 PROCEDURE — G0151 HHCP-SERV OF PT,EA 15 MIN: HCPCS

## 2022-11-21 PROCEDURE — 3331090002 HH PPS REVENUE DEBIT

## 2022-11-22 ENCOUNTER — HOME CARE VISIT (OUTPATIENT)
Dept: SCHEDULING | Facility: HOME HEALTH | Age: 87
End: 2022-11-22
Payer: MEDICARE

## 2022-11-22 VITALS
TEMPERATURE: 98.5 F | SYSTOLIC BLOOD PRESSURE: 118 MMHG | OXYGEN SATURATION: 96 % | DIASTOLIC BLOOD PRESSURE: 64 MMHG | HEART RATE: 73 BPM | RESPIRATION RATE: 16 BRPM

## 2022-11-22 PROCEDURE — 3331090002 HH PPS REVENUE DEBIT

## 2022-11-22 PROCEDURE — 3331090001 HH PPS REVENUE CREDIT

## 2022-11-22 PROCEDURE — G0151 HHCP-SERV OF PT,EA 15 MIN: HCPCS

## 2022-11-22 PROCEDURE — 400013 HH SOC

## 2022-11-23 PROCEDURE — 3331090002 HH PPS REVENUE DEBIT

## 2022-11-23 PROCEDURE — 3331090001 HH PPS REVENUE CREDIT

## 2022-11-24 PROCEDURE — 3331090002 HH PPS REVENUE DEBIT

## 2022-11-24 PROCEDURE — 3331090001 HH PPS REVENUE CREDIT

## 2022-11-25 PROCEDURE — 3331090001 HH PPS REVENUE CREDIT

## 2022-11-25 PROCEDURE — 3331090002 HH PPS REVENUE DEBIT

## 2022-11-26 PROCEDURE — 3331090002 HH PPS REVENUE DEBIT

## 2022-11-26 PROCEDURE — 3331090001 HH PPS REVENUE CREDIT

## 2022-11-27 PROCEDURE — 3331090001 HH PPS REVENUE CREDIT

## 2022-11-27 PROCEDURE — 3331090002 HH PPS REVENUE DEBIT

## 2022-11-28 ENCOUNTER — HOME CARE VISIT (OUTPATIENT)
Dept: SCHEDULING | Facility: HOME HEALTH | Age: 87
End: 2022-11-28
Payer: MEDICARE

## 2022-11-28 ENCOUNTER — HOME CARE VISIT (OUTPATIENT)
Dept: HOME HEALTH SERVICES | Facility: HOME HEALTH | Age: 87
End: 2022-11-28
Payer: MEDICARE

## 2022-11-28 VITALS
DIASTOLIC BLOOD PRESSURE: 64 MMHG | SYSTOLIC BLOOD PRESSURE: 116 MMHG | OXYGEN SATURATION: 93 % | TEMPERATURE: 98.6 F | HEART RATE: 84 BPM | RESPIRATION RATE: 16 BRPM

## 2022-11-28 PROCEDURE — 3331090001 HH PPS REVENUE CREDIT

## 2022-11-28 PROCEDURE — G0151 HHCP-SERV OF PT,EA 15 MIN: HCPCS

## 2022-11-28 PROCEDURE — 3331090002 HH PPS REVENUE DEBIT

## 2022-11-29 PROCEDURE — 3331090001 HH PPS REVENUE CREDIT

## 2022-11-29 PROCEDURE — 3331090002 HH PPS REVENUE DEBIT

## 2022-11-30 ENCOUNTER — HOME CARE VISIT (OUTPATIENT)
Dept: SCHEDULING | Facility: HOME HEALTH | Age: 87
End: 2022-11-30
Payer: MEDICARE

## 2022-11-30 VITALS
HEART RATE: 67 BPM | TEMPERATURE: 98.4 F | SYSTOLIC BLOOD PRESSURE: 118 MMHG | DIASTOLIC BLOOD PRESSURE: 64 MMHG | RESPIRATION RATE: 16 BRPM | OXYGEN SATURATION: 92 %

## 2022-11-30 PROCEDURE — 3331090002 HH PPS REVENUE DEBIT

## 2022-11-30 PROCEDURE — G0151 HHCP-SERV OF PT,EA 15 MIN: HCPCS

## 2022-11-30 PROCEDURE — 3331090001 HH PPS REVENUE CREDIT

## 2022-12-01 PROCEDURE — 3331090001 HH PPS REVENUE CREDIT

## 2022-12-01 PROCEDURE — 3331090002 HH PPS REVENUE DEBIT

## 2022-12-05 ENCOUNTER — HOME CARE VISIT (OUTPATIENT)
Dept: SCHEDULING | Facility: HOME HEALTH | Age: 87
End: 2022-12-05
Payer: MEDICARE

## 2022-12-05 VITALS
SYSTOLIC BLOOD PRESSURE: 116 MMHG | RESPIRATION RATE: 16 BRPM | OXYGEN SATURATION: 96 % | TEMPERATURE: 98.6 F | DIASTOLIC BLOOD PRESSURE: 70 MMHG | HEART RATE: 67 BPM

## 2022-12-05 PROCEDURE — G0151 HHCP-SERV OF PT,EA 15 MIN: HCPCS

## 2022-12-07 ENCOUNTER — HOME CARE VISIT (OUTPATIENT)
Dept: SCHEDULING | Facility: HOME HEALTH | Age: 87
End: 2022-12-07
Payer: MEDICARE

## 2022-12-07 VITALS
RESPIRATION RATE: 16 BRPM | TEMPERATURE: 98.4 F | SYSTOLIC BLOOD PRESSURE: 118 MMHG | HEART RATE: 66 BPM | DIASTOLIC BLOOD PRESSURE: 64 MMHG | OXYGEN SATURATION: 95 %

## 2022-12-07 PROCEDURE — G0151 HHCP-SERV OF PT,EA 15 MIN: HCPCS

## 2022-12-12 ENCOUNTER — HOME CARE VISIT (OUTPATIENT)
Dept: SCHEDULING | Facility: HOME HEALTH | Age: 87
End: 2022-12-12
Payer: MEDICARE

## 2022-12-12 VITALS
DIASTOLIC BLOOD PRESSURE: 70 MMHG | TEMPERATURE: 98.6 F | RESPIRATION RATE: 16 BRPM | OXYGEN SATURATION: 97 % | HEART RATE: 60 BPM | SYSTOLIC BLOOD PRESSURE: 118 MMHG

## 2022-12-12 PROCEDURE — G0151 HHCP-SERV OF PT,EA 15 MIN: HCPCS

## 2022-12-13 ENCOUNTER — HOME CARE VISIT (OUTPATIENT)
Dept: HOME HEALTH SERVICES | Facility: HOME HEALTH | Age: 87
End: 2022-12-13
Payer: MEDICARE

## 2022-12-14 ENCOUNTER — HOME CARE VISIT (OUTPATIENT)
Dept: SCHEDULING | Facility: HOME HEALTH | Age: 87
End: 2022-12-14
Payer: MEDICARE

## 2022-12-14 VITALS
RESPIRATION RATE: 16 BRPM | TEMPERATURE: 99 F | SYSTOLIC BLOOD PRESSURE: 116 MMHG | OXYGEN SATURATION: 95 % | DIASTOLIC BLOOD PRESSURE: 64 MMHG | HEART RATE: 62 BPM

## 2022-12-14 PROCEDURE — G0151 HHCP-SERV OF PT,EA 15 MIN: HCPCS

## 2023-08-09 ENCOUNTER — APPOINTMENT (OUTPATIENT)
Facility: HOSPITAL | Age: 88
End: 2023-08-09
Payer: MEDICARE

## 2023-08-09 ENCOUNTER — HOSPITAL ENCOUNTER (EMERGENCY)
Facility: HOSPITAL | Age: 88
Discharge: HOME | End: 2023-08-12
Payer: MEDICARE

## 2023-08-09 ENCOUNTER — HOSPITAL ENCOUNTER (INPATIENT)
Facility: HOSPITAL | Age: 88
LOS: 7 days | Discharge: HOME | End: 2023-08-17
Attending: STUDENT IN AN ORGANIZED HEALTH CARE EDUCATION/TRAINING PROGRAM | Admitting: INTERNAL MEDICINE
Payer: MEDICARE
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
## 2023-08-09 DIAGNOSIS — S20.211A CONTUSION OF RIB ON RIGHT SIDE, INITIAL ENCOUNTER: ICD-10-CM

## 2023-08-09 DIAGNOSIS — R77.8 TROPONIN LEVEL ELEVATED: ICD-10-CM

## 2023-08-09 DIAGNOSIS — N30.00 ACUTE CYSTITIS WITHOUT HEMATURIA: Primary | ICD-10-CM

## 2023-08-09 DIAGNOSIS — N20.0 KIDNEY STONE: ICD-10-CM

## 2023-08-09 LAB
ALBUMIN SERPL-MCNC: 3.6 G/DL (ref 3.5–5)
ALBUMIN/GLOB SERPL: 1 (ref 1.1–2.2)
ALP SERPL-CCNC: 89 U/L (ref 45–117)
ALT SERPL-CCNC: 31 U/L (ref 12–78)
ANION GAP SERPL CALC-SCNC: 7 MMOL/L (ref 5–15)
APPEARANCE UR: ABNORMAL
AST SERPL-CCNC: 29 U/L (ref 15–37)
BACTERIA URNS QL MICRO: NEGATIVE /HPF
BASOPHILS # BLD: 0 K/UL (ref 0–0.1)
BASOPHILS NFR BLD: 0 % (ref 0–1)
BILIRUB SERPL-MCNC: 0.6 MG/DL (ref 0.2–1)
BILIRUB UR QL: NEGATIVE
BUN SERPL-MCNC: 29 MG/DL (ref 6–20)
BUN/CREAT SERPL: 18 (ref 12–20)
CALCIUM SERPL-MCNC: 9.6 MG/DL (ref 8.5–10.1)
CHLORIDE SERPL-SCNC: 108 MMOL/L (ref 97–108)
CK SERPL-CCNC: 199 U/L (ref 26–192)
CO2 SERPL-SCNC: 24 MMOL/L (ref 21–32)
COLOR UR: ABNORMAL
COMMENT:: NORMAL
CREAT SERPL-MCNC: 1.59 MG/DL (ref 0.55–1.02)
DIFFERENTIAL METHOD BLD: ABNORMAL
EOSINOPHIL # BLD: 0.1 K/UL (ref 0–0.4)
EOSINOPHIL NFR BLD: 1 % (ref 0–7)
EPITH CASTS URNS QL MICRO: ABNORMAL /LPF
ERYTHROCYTE [DISTWIDTH] IN BLOOD BY AUTOMATED COUNT: 13.5 % (ref 11.5–14.5)
GLOBULIN SER CALC-MCNC: 3.6 G/DL (ref 2–4)
GLUCOSE SERPL-MCNC: 127 MG/DL (ref 65–100)
GLUCOSE UR STRIP.AUTO-MCNC: NEGATIVE MG/DL
HCT VFR BLD AUTO: 40.9 % (ref 35–47)
HGB BLD-MCNC: 13.5 G/DL (ref 11.5–16)
HGB UR QL STRIP: ABNORMAL
IMM GRANULOCYTES # BLD AUTO: 0.1 K/UL (ref 0–0.04)
IMM GRANULOCYTES NFR BLD AUTO: 1 % (ref 0–0.5)
KETONES UR QL STRIP.AUTO: 15 MG/DL
LEUKOCYTE ESTERASE UR QL STRIP.AUTO: ABNORMAL
LYMPHOCYTES # BLD: 1.7 K/UL (ref 0.8–3.5)
LYMPHOCYTES NFR BLD: 9 % (ref 12–49)
MAGNESIUM SERPL-MCNC: 2.1 MG/DL (ref 1.6–2.4)
MCH RBC QN AUTO: 33.8 PG (ref 26–34)
MCHC RBC AUTO-ENTMCNC: 33 G/DL (ref 30–36.5)
MCV RBC AUTO: 102.3 FL (ref 80–99)
MONOCYTES # BLD: 1.2 K/UL (ref 0–1)
MONOCYTES NFR BLD: 6 % (ref 5–13)
NEUTS SEG # BLD: 14.8 K/UL (ref 1.8–8)
NEUTS SEG NFR BLD: 83 % (ref 32–75)
NITRITE UR QL STRIP.AUTO: NEGATIVE
NRBC # BLD: 0 K/UL (ref 0–0.01)
NRBC BLD-RTO: 0 PER 100 WBC
PH UR STRIP: 7.5 (ref 5–8)
PLATELET # BLD AUTO: 165 K/UL (ref 150–400)
PMV BLD AUTO: 11.5 FL (ref 8.9–12.9)
POTASSIUM SERPL-SCNC: 3.8 MMOL/L (ref 3.5–5.1)
PROT SERPL-MCNC: 7.2 G/DL (ref 6.4–8.2)
PROT UR STRIP-MCNC: 30 MG/DL
RBC # BLD AUTO: 4 M/UL (ref 3.8–5.2)
RBC #/AREA URNS HPF: ABNORMAL /HPF (ref 0–5)
SODIUM SERPL-SCNC: 139 MMOL/L (ref 136–145)
SP GR UR REFRACTOMETRY: 1.01 (ref 1–1.03)
SPECIMEN HOLD: NORMAL
URINE CULTURE IF INDICATED: ABNORMAL
UROBILINOGEN UR QL STRIP.AUTO: 0.2 EU/DL (ref 0.2–1)
WBC # BLD AUTO: 17.9 K/UL (ref 3.6–11)
WBC URNS QL MICRO: >100 /HPF (ref 0–4)

## 2023-08-09 PROCEDURE — 81001 URINALYSIS AUTO W/SCOPE: CPT

## 2023-08-09 PROCEDURE — 87086 URINE CULTURE/COLONY COUNT: CPT

## 2023-08-09 PROCEDURE — 83735 ASSAY OF MAGNESIUM: CPT

## 2023-08-09 PROCEDURE — 85025 COMPLETE CBC W/AUTO DIFF WBC: CPT

## 2023-08-09 PROCEDURE — 96365 THER/PROPH/DIAG IV INF INIT: CPT

## 2023-08-09 PROCEDURE — 51702 INSERT TEMP BLADDER CATH: CPT

## 2023-08-09 PROCEDURE — 36415 COLL VENOUS BLD VENIPUNCTURE: CPT

## 2023-08-09 PROCEDURE — 71250 CT THORAX DX C-: CPT

## 2023-08-09 PROCEDURE — 82550 ASSAY OF CK (CPK): CPT

## 2023-08-09 PROCEDURE — 99285 EMERGENCY DEPT VISIT HI MDM: CPT

## 2023-08-09 PROCEDURE — 6370000000 HC RX 637 (ALT 250 FOR IP): Performed by: STUDENT IN AN ORGANIZED HEALTH CARE EDUCATION/TRAINING PROGRAM

## 2023-08-09 PROCEDURE — 70450 CT HEAD/BRAIN W/O DYE: CPT

## 2023-08-09 PROCEDURE — 93005 ELECTROCARDIOGRAM TRACING: CPT | Performed by: STUDENT IN AN ORGANIZED HEALTH CARE EDUCATION/TRAINING PROGRAM

## 2023-08-09 PROCEDURE — 72125 CT NECK SPINE W/O DYE: CPT

## 2023-08-09 PROCEDURE — 2580000003 HC RX 258: Performed by: STUDENT IN AN ORGANIZED HEALTH CARE EDUCATION/TRAINING PROGRAM

## 2023-08-09 PROCEDURE — 6360000002 HC RX W HCPCS: Performed by: STUDENT IN AN ORGANIZED HEALTH CARE EDUCATION/TRAINING PROGRAM

## 2023-08-09 PROCEDURE — 73521 X-RAY EXAM HIPS BI 2 VIEWS: CPT

## 2023-08-09 PROCEDURE — 80053 COMPREHEN METABOLIC PANEL: CPT

## 2023-08-09 RX ORDER — LIDOCAINE 4 G/G
1 PATCH TOPICAL
Status: COMPLETED | OUTPATIENT
Start: 2023-08-09 | End: 2023-08-10

## 2023-08-09 RX ORDER — ACETAMINOPHEN 500 MG
1000 TABLET ORAL
Status: COMPLETED | OUTPATIENT
Start: 2023-08-09 | End: 2023-08-09

## 2023-08-09 RX ORDER — LEVOFLOXACIN 5 MG/ML
750 INJECTION, SOLUTION INTRAVENOUS
Status: COMPLETED | OUTPATIENT
Start: 2023-08-09 | End: 2023-08-10

## 2023-08-09 RX ORDER — LIDOCAINE 50 MG/G
1 PATCH TOPICAL DAILY
Qty: 10 PATCH | Refills: 0 | Status: SHIPPED | OUTPATIENT
Start: 2023-08-09 | End: 2023-08-12 | Stop reason: SDUPTHER

## 2023-08-09 RX ORDER — 0.9 % SODIUM CHLORIDE 0.9 %
500 INTRAVENOUS SOLUTION INTRAVENOUS ONCE
Status: COMPLETED | OUTPATIENT
Start: 2023-08-09 | End: 2023-08-09

## 2023-08-09 RX ORDER — CIPROFLOXACIN 500 MG/1
500 TABLET, FILM COATED ORAL 2 TIMES DAILY
Qty: 28 TABLET | Refills: 0 | Status: SHIPPED | OUTPATIENT
Start: 2023-08-09 | End: 2023-08-10

## 2023-08-09 RX ADMIN — LEVOFLOXACIN 750 MG: 5 INJECTION, SOLUTION INTRAVENOUS at 23:27

## 2023-08-09 RX ADMIN — SODIUM CHLORIDE 500 ML: 9 INJECTION, SOLUTION INTRAVENOUS at 20:30

## 2023-08-09 RX ADMIN — ACETAMINOPHEN 1000 MG: 500 TABLET ORAL at 20:23

## 2023-08-09 ASSESSMENT — PAIN - FUNCTIONAL ASSESSMENT: PAIN_FUNCTIONAL_ASSESSMENT: ADULT NONVERBAL PAIN SCALE (NPVS)

## 2023-08-09 NOTE — ED PROVIDER NOTES
[facility] EMERGENCY DEP  EMERGENCY DEPARTMENT ENCOUNTER      Pt Name: Vikas Brunson  MRN: [id number]  [address] [facility] 1935  Date of evaluation: 8/9/2023  Provider: Nhan Ko       Chief Complaint   Patient presents with    Fall    Rib Pain (injury)         HISTORY OF PRESENT ILLNESS    HPI    Vikas Brunson is a 81 y.o. female with a history of hypertension, hypothyroidism, dementia who presents to the emergency department for evaluation of a fall. Patient was found on the floor by her caregiver this evening. She is complaining of right rib pain. Denies any head, neck or back pain. No abdominal pain. Denies hip pain but with moving the legs she grimaces. Nursing Notes were reviewed.     REVIEW OF SYSTEMS       Review of Systems   Unable to perform ROS: Dementia         PAST MEDICAL HISTORY     Past Medical History:   Diagnosis Date    Breast CA ([address])     Fluid retention in legs     Hypertension     Hypothyroidism          SURGICAL HISTORY       Past Surgical History:   Procedure Laterality Date    BREAST SURGERY      Left lumpectomy    ORTHOPEDIC SURGERY  2007    left hip replacement         CURRENT MEDICATIONS       Previous Medications    ACETAMINOPHEN (TYLENOL) 500 MG TABLET    Take 500 mg by mouth every 6 hours as needed    ASPIRIN 81 MG EC TABLET    Take 81 mg by mouth daily    ATORVASTATIN (LIPITOR) 40 MG TABLET    Take 40 mg by mouth daily    DICLOFENAC SODIUM (VOLTAREN) 1 % GEL    Apply 2 g topically daily    DONEPEZIL (ARICEPT) 5 MG TABLET    Take 5 mg by mouth    ESCITALOPRAM (LEXAPRO) 20 MG TABLET    Take 20 mg by mouth daily    HALOPERIDOL (HALDOL) 0.5 MG TABLET    Take 0.5 mg by mouth    LEVOTHYROXINE (SYNTHROID) 75 MCG TABLET    Take 75 mcg by mouth every morning (before breakfast)    MEMANTINE ER (NAMENDA XR) 28 MG CP24 EXTENDED RELEASE CAPSULE    Take 28 mg by mouth daily    SENNA (SENOKOT) 8.6 MG TABLET    Take 1 tablet by mouth daily       ALLERGIES

## 2023-08-09 NOTE — ED TRIAGE NOTES
Patient arrives to ED via EMS for [facility]. Patient found on floor by home caregiver. Patient complaining of right rib pain.

## 2023-08-10 ENCOUNTER — APPOINTMENT (OUTPATIENT)
Facility: HOSPITAL | Age: 88
End: 2023-08-10
Payer: MEDICARE

## 2023-08-10 PROBLEM — I10 HYPERTENSION: Status: ACTIVE | Noted: 2023-08-10

## 2023-08-10 PROBLEM — E03.9 HYPOTHYROIDISM: Status: ACTIVE | Noted: 2020-01-20

## 2023-08-10 PROBLEM — N28.89 DILATED RENAL PELVIS: Status: ACTIVE | Noted: 2023-08-10

## 2023-08-10 PROBLEM — E78.5 HYPERLIPIDEMIA: Status: RESOLVED | Noted: 2020-01-20 | Resolved: 2023-08-10

## 2023-08-10 PROBLEM — N17.9 AKI (ACUTE KIDNEY INJURY) (HCC): Status: ACTIVE | Noted: 2023-08-10

## 2023-08-10 PROBLEM — Z86.73 HX OF COMPLETED STROKE: Status: ACTIVE | Noted: 2023-08-10

## 2023-08-10 PROBLEM — E86.0 DEHYDRATION: Status: ACTIVE | Noted: 2023-08-10

## 2023-08-10 PROBLEM — R53.81 DEBILITATED PATIENT: Status: ACTIVE | Noted: 2023-08-10

## 2023-08-10 PROBLEM — U07.1 COVID-19: Status: RESOLVED | Noted: 2022-07-03 | Resolved: 2023-08-10

## 2023-08-10 PROBLEM — R47.01 EXPRESSIVE APHASIA: Status: RESOLVED | Noted: 2020-01-20 | Resolved: 2023-08-10

## 2023-08-10 PROBLEM — R82.81 PYURIA: Status: ACTIVE | Noted: 2023-08-10

## 2023-08-10 PROBLEM — N18.30 CKD (CHRONIC KIDNEY DISEASE) STAGE 3, GFR 30-59 ML/MIN (HCC): Status: ACTIVE | Noted: 2023-08-10

## 2023-08-10 PROBLEM — R41.82 ALTERED MENTAL STATUS: Status: RESOLVED | Noted: 2022-07-03 | Resolved: 2023-08-10

## 2023-08-10 PROBLEM — R41.0 ACUTE DELIRIUM: Status: ACTIVE | Noted: 2023-08-10

## 2023-08-10 PROBLEM — R26.2 AMBULATORY DYSFUNCTION: Status: ACTIVE | Noted: 2022-10-15

## 2023-08-10 PROBLEM — I63.9 STROKE (CEREBRUM) (HCC): Status: RESOLVED | Noted: 2020-01-18 | Resolved: 2023-08-10

## 2023-08-10 PROBLEM — K44.9 HIATAL HERNIA: Status: ACTIVE | Noted: 2023-08-10

## 2023-08-10 PROBLEM — N20.0 NEPHROLITHIASIS: Status: ACTIVE | Noted: 2023-08-10

## 2023-08-10 LAB
ALBUMIN SERPL-MCNC: 3.1 G/DL (ref 3.5–5)
ALBUMIN/GLOB SERPL: 1 (ref 1.1–2.2)
ALP SERPL-CCNC: 73 U/L (ref 45–117)
ALT SERPL-CCNC: 28 U/L (ref 12–78)
AMMONIA PLAS-SCNC: 23 UMOL/L
ANION GAP SERPL CALC-SCNC: 6 MMOL/L (ref 5–15)
APAP SERPL-MCNC: 4 UG/ML (ref 10–30)
APPEARANCE UR: ABNORMAL
AST SERPL-CCNC: 32 U/L (ref 15–37)
BACTERIA SPEC CULT: NORMAL
BACTERIA URNS QL MICRO: NEGATIVE /HPF
BASOPHILS # BLD: 0 K/UL (ref 0–0.1)
BASOPHILS NFR BLD: 0 % (ref 0–1)
BILIRUB SERPL-MCNC: 0.8 MG/DL (ref 0.2–1)
BILIRUB UR QL: NEGATIVE
BUN SERPL-MCNC: 27 MG/DL (ref 6–20)
BUN/CREAT SERPL: 18 (ref 12–20)
CALCIUM SERPL-MCNC: 8.6 MG/DL (ref 8.5–10.1)
CC UR VC: NORMAL
CHLORIDE SERPL-SCNC: 109 MMOL/L (ref 97–108)
CK SERPL-CCNC: 335 U/L (ref 26–192)
CO2 SERPL-SCNC: 23 MMOL/L (ref 21–32)
COLOR UR: ABNORMAL
CREAT SERPL-MCNC: 1.49 MG/DL (ref 0.55–1.02)
DIFFERENTIAL METHOD BLD: ABNORMAL
EOSINOPHIL # BLD: 0.1 K/UL (ref 0–0.4)
EOSINOPHIL NFR BLD: 1 % (ref 0–7)
EPITH CASTS URNS QL MICRO: ABNORMAL /LPF
ERYTHROCYTE [DISTWIDTH] IN BLOOD BY AUTOMATED COUNT: 13.9 % (ref 11.5–14.5)
ETHANOL SERPL-MCNC: <10 MG/DL (ref 0–0.08)
FOLATE SERPL-MCNC: 33.7 NG/ML (ref 5–21)
GLOBULIN SER CALC-MCNC: 3 G/DL (ref 2–4)
GLUCOSE SERPL-MCNC: 125 MG/DL (ref 65–100)
GLUCOSE UR STRIP.AUTO-MCNC: NEGATIVE MG/DL
HCT VFR BLD AUTO: 34.8 % (ref 35–47)
HGB BLD-MCNC: 11.6 G/DL (ref 11.5–16)
HGB UR QL STRIP: ABNORMAL
HYALINE CASTS URNS QL MICRO: ABNORMAL /LPF (ref 0–5)
IMM GRANULOCYTES # BLD AUTO: 0 K/UL (ref 0–0.04)
IMM GRANULOCYTES NFR BLD AUTO: 0 % (ref 0–0.5)
KETONES UR QL STRIP.AUTO: ABNORMAL MG/DL
LEUKOCYTE ESTERASE UR QL STRIP.AUTO: ABNORMAL
LIPASE SERPL-CCNC: 128 U/L (ref 73–393)
LYMPHOCYTES # BLD: 1.5 K/UL (ref 0.8–3.5)
LYMPHOCYTES NFR BLD: 17 % (ref 12–49)
MAGNESIUM SERPL-MCNC: 2 MG/DL (ref 1.6–2.4)
MCH RBC QN AUTO: 34 PG (ref 26–34)
MCHC RBC AUTO-ENTMCNC: 33.3 G/DL (ref 30–36.5)
MCV RBC AUTO: 102.1 FL (ref 80–99)
MONOCYTES # BLD: 1 K/UL (ref 0–1)
MONOCYTES NFR BLD: 11 % (ref 5–13)
NEUTS SEG # BLD: 6.6 K/UL (ref 1.8–8)
NEUTS SEG NFR BLD: 71 % (ref 32–75)
NITRITE UR QL STRIP.AUTO: NEGATIVE
NRBC # BLD: 0 K/UL (ref 0–0.01)
NRBC BLD-RTO: 0 PER 100 WBC
PH UR STRIP: 7.5 (ref 5–8)
PHOSPHATE SERPL-MCNC: 2.4 MG/DL (ref 2.6–4.7)
PLATELET # BLD AUTO: 145 K/UL (ref 150–400)
PMV BLD AUTO: 11.6 FL (ref 8.9–12.9)
POTASSIUM SERPL-SCNC: 3.5 MMOL/L (ref 3.5–5.1)
PROT SERPL-MCNC: 6.1 G/DL (ref 6.4–8.2)
PROT UR STRIP-MCNC: 30 MG/DL
RBC # BLD AUTO: 3.41 M/UL (ref 3.8–5.2)
RBC #/AREA URNS HPF: ABNORMAL /HPF (ref 0–5)
SALICYLATES SERPL-MCNC: <1.7 MG/DL (ref 2.8–20)
SERVICE CMNT-IMP: NORMAL
SODIUM SERPL-SCNC: 138 MMOL/L (ref 136–145)
SP GR UR REFRACTOMETRY: 1.01 (ref 1–1.03)
TROPONIN I SERPL HS-MCNC: 322 NG/L (ref 0–51)
TSH SERPL DL<=0.05 MIU/L-ACNC: 6.65 UIU/ML (ref 0.36–3.74)
UROBILINOGEN UR QL STRIP.AUTO: 0.2 EU/DL (ref 0.2–1)
VIT B12 SERPL-MCNC: 730 PG/ML (ref 193–986)
WBC # BLD AUTO: 9.2 K/UL (ref 3.6–11)
WBC URNS QL MICRO: >100 /HPF (ref 0–4)

## 2023-08-10 PROCEDURE — 83735 ASSAY OF MAGNESIUM: CPT

## 2023-08-10 PROCEDURE — 80179 DRUG ASSAY SALICYLATE: CPT

## 2023-08-10 PROCEDURE — 2580000003 HC RX 258: Performed by: INTERNAL MEDICINE

## 2023-08-10 PROCEDURE — 81001 URINALYSIS AUTO W/SCOPE: CPT

## 2023-08-10 PROCEDURE — 84443 ASSAY THYROID STIM HORMONE: CPT

## 2023-08-10 PROCEDURE — 85025 COMPLETE CBC W/AUTO DIFF WBC: CPT

## 2023-08-10 PROCEDURE — 82746 ASSAY OF FOLIC ACID SERUM: CPT

## 2023-08-10 PROCEDURE — 1200000000 HC SEMI PRIVATE

## 2023-08-10 PROCEDURE — 6370000000 HC RX 637 (ALT 250 FOR IP): Performed by: INTERNAL MEDICINE

## 2023-08-10 PROCEDURE — 36415 COLL VENOUS BLD VENIPUNCTURE: CPT

## 2023-08-10 PROCEDURE — 84100 ASSAY OF PHOSPHORUS: CPT

## 2023-08-10 PROCEDURE — 84484 ASSAY OF TROPONIN QUANT: CPT

## 2023-08-10 PROCEDURE — 82607 VITAMIN B-12: CPT

## 2023-08-10 PROCEDURE — 6360000002 HC RX W HCPCS: Performed by: INTERNAL MEDICINE

## 2023-08-10 PROCEDURE — 82077 ASSAY SPEC XCP UR&BREATH IA: CPT

## 2023-08-10 PROCEDURE — 97116 GAIT TRAINING THERAPY: CPT

## 2023-08-10 PROCEDURE — 82140 ASSAY OF AMMONIA: CPT

## 2023-08-10 PROCEDURE — 74176 CT ABD & PELVIS W/O CONTRAST: CPT

## 2023-08-10 PROCEDURE — 97530 THERAPEUTIC ACTIVITIES: CPT

## 2023-08-10 PROCEDURE — 80053 COMPREHEN METABOLIC PANEL: CPT

## 2023-08-10 PROCEDURE — 80143 DRUG ASSAY ACETAMINOPHEN: CPT

## 2023-08-10 PROCEDURE — 82550 ASSAY OF CK (CPK): CPT

## 2023-08-10 PROCEDURE — 97161 PT EVAL LOW COMPLEX 20 MIN: CPT

## 2023-08-10 PROCEDURE — 87086 URINE CULTURE/COLONY COUNT: CPT

## 2023-08-10 PROCEDURE — 83690 ASSAY OF LIPASE: CPT

## 2023-08-10 RX ORDER — HALOPERIDOL 1 MG/1
0.5 TABLET ORAL EVERY 8 HOURS PRN
Status: DISCONTINUED | OUTPATIENT
Start: 2023-08-10 | End: 2023-08-17 | Stop reason: HOSPADM

## 2023-08-10 RX ORDER — HEPARIN SODIUM 5000 [USP'U]/ML
5000 INJECTION, SOLUTION INTRAVENOUS; SUBCUTANEOUS EVERY 8 HOURS SCHEDULED
Status: DISCONTINUED | OUTPATIENT
Start: 2023-08-10 | End: 2023-08-17 | Stop reason: HOSPADM

## 2023-08-10 RX ORDER — SODIUM CHLORIDE 9 MG/ML
INJECTION, SOLUTION INTRAVENOUS CONTINUOUS
Status: DISCONTINUED | OUTPATIENT
Start: 2023-08-10 | End: 2023-08-17

## 2023-08-10 RX ORDER — MEMANTINE HYDROCHLORIDE 10 MG/1
10 TABLET ORAL 2 TIMES DAILY
Status: DISCONTINUED | OUTPATIENT
Start: 2023-08-10 | End: 2023-08-17 | Stop reason: HOSPADM

## 2023-08-10 RX ORDER — POLYETHYLENE GLYCOL 3350 17 G/17G
17 POWDER, FOR SOLUTION ORAL DAILY PRN
Status: DISCONTINUED | OUTPATIENT
Start: 2023-08-10 | End: 2023-08-17 | Stop reason: HOSPADM

## 2023-08-10 RX ORDER — ASPIRIN 81 MG/1
81 TABLET ORAL DAILY
Status: DISCONTINUED | OUTPATIENT
Start: 2023-08-10 | End: 2023-08-17 | Stop reason: HOSPADM

## 2023-08-10 RX ORDER — ONDANSETRON 4 MG/1
4 TABLET, ORALLY DISINTEGRATING ORAL EVERY 8 HOURS PRN
Status: DISCONTINUED | OUTPATIENT
Start: 2023-08-10 | End: 2023-08-17 | Stop reason: HOSPADM

## 2023-08-10 RX ORDER — ESCITALOPRAM OXALATE 10 MG/1
20 TABLET ORAL DAILY
Status: DISCONTINUED | OUTPATIENT
Start: 2023-08-10 | End: 2023-08-17 | Stop reason: HOSPADM

## 2023-08-10 RX ORDER — ONDANSETRON 2 MG/ML
4 INJECTION INTRAMUSCULAR; INTRAVENOUS EVERY 6 HOURS PRN
Status: DISCONTINUED | OUTPATIENT
Start: 2023-08-10 | End: 2023-08-17 | Stop reason: HOSPADM

## 2023-08-10 RX ORDER — SODIUM CHLORIDE 0.9 % (FLUSH) 0.9 %
5-40 SYRINGE (ML) INJECTION PRN
Status: DISCONTINUED | OUTPATIENT
Start: 2023-08-10 | End: 2023-08-17 | Stop reason: HOSPADM

## 2023-08-10 RX ORDER — DONEPEZIL HYDROCHLORIDE 5 MG/1
5 TABLET, FILM COATED ORAL NIGHTLY
Status: DISCONTINUED | OUTPATIENT
Start: 2023-08-10 | End: 2023-08-17 | Stop reason: HOSPADM

## 2023-08-10 RX ORDER — SODIUM CHLORIDE 9 MG/ML
INJECTION, SOLUTION INTRAVENOUS PRN
Status: DISCONTINUED | OUTPATIENT
Start: 2023-08-10 | End: 2023-08-17 | Stop reason: HOSPADM

## 2023-08-10 RX ORDER — OXYCODONE HYDROCHLORIDE 5 MG/1
5 TABLET ORAL EVERY 4 HOURS PRN
Status: DISCONTINUED | OUTPATIENT
Start: 2023-08-10 | End: 2023-08-12

## 2023-08-10 RX ORDER — FENTANYL CITRATE 50 UG/ML
25 INJECTION, SOLUTION INTRAMUSCULAR; INTRAVENOUS EVERY 4 HOURS PRN
Status: DISCONTINUED | OUTPATIENT
Start: 2023-08-10 | End: 2023-08-12

## 2023-08-10 RX ORDER — LEVOTHYROXINE SODIUM 0.07 MG/1
75 TABLET ORAL
Status: DISCONTINUED | OUTPATIENT
Start: 2023-08-10 | End: 2023-08-17 | Stop reason: HOSPADM

## 2023-08-10 RX ORDER — LEVOFLOXACIN 5 MG/ML
750 INJECTION, SOLUTION INTRAVENOUS EVERY 24 HOURS
Status: DISCONTINUED | OUTPATIENT
Start: 2023-08-10 | End: 2023-08-10

## 2023-08-10 RX ORDER — SODIUM CHLORIDE 0.9 % (FLUSH) 0.9 %
5-40 SYRINGE (ML) INJECTION EVERY 12 HOURS SCHEDULED
Status: DISCONTINUED | OUTPATIENT
Start: 2023-08-10 | End: 2023-08-17 | Stop reason: HOSPADM

## 2023-08-10 RX ORDER — ATORVASTATIN CALCIUM 10 MG/1
40 TABLET, FILM COATED ORAL DAILY
Status: DISCONTINUED | OUTPATIENT
Start: 2023-08-10 | End: 2023-08-10

## 2023-08-10 RX ADMIN — DONEPEZIL HYDROCHLORIDE 5 MG: 5 TABLET, FILM COATED ORAL at 21:11

## 2023-08-10 RX ADMIN — MEMANTINE HYDROCHLORIDE 10 MG: 10 TABLET ORAL at 21:12

## 2023-08-10 RX ADMIN — MEMANTINE HYDROCHLORIDE 10 MG: 10 TABLET ORAL at 10:42

## 2023-08-10 RX ADMIN — HEPARIN SODIUM 5000 UNITS: 5000 INJECTION INTRAVENOUS; SUBCUTANEOUS at 14:29

## 2023-08-10 RX ADMIN — ESCITALOPRAM OXALATE 20 MG: 10 TABLET ORAL at 10:42

## 2023-08-10 RX ADMIN — SODIUM CHLORIDE: 900 INJECTION, SOLUTION INTRAVENOUS at 21:12

## 2023-08-10 RX ADMIN — LEVOTHYROXINE SODIUM 75 MCG: 0.07 TABLET ORAL at 07:35

## 2023-08-10 RX ADMIN — ASPIRIN 81 MG: 81 TABLET, COATED ORAL at 10:42

## 2023-08-10 RX ADMIN — HEPARIN SODIUM 5000 UNITS: 5000 INJECTION INTRAVENOUS; SUBCUTANEOUS at 07:35

## 2023-08-10 RX ADMIN — HEPARIN SODIUM 5000 UNITS: 5000 INJECTION INTRAVENOUS; SUBCUTANEOUS at 21:12

## 2023-08-10 RX ADMIN — SODIUM CHLORIDE: 900 INJECTION, SOLUTION INTRAVENOUS at 07:35

## 2023-08-10 NOTE — H&P
[address] [place] [address]  HISTORY AND PHYSICAL    Name:  Bobo Fan  MR#:  [id number]  :  1935  ACCOUNT #:  [de-identified]  ADMIT DATE:  2023      The patient was seen, evaluated, and admitted by me on 08/10/2023. PRIMARY CARE PHYSICIAN:  Rachel Lennon MD    SOURCE OF INFORMATION:  The patient who is not a good historian because of her dementia. The patient's son was present at the bedside and review of ED electronic medical records. CHIEF COMPLAINT:  Fall at home. HISTORY OF PRESENT ILLNESS:  This is an 81year-old woman with past medical history significant for chronic kidney disease, hypothyroidism, dyslipidemia, and dementia; presented at the emergency room for evaluation following a fall at home. The fall was not witnessed. The patient was found on the floor by home caregiver and it is not clear how long the patient was on the floor before she was found by the home caregiver. The patient started complaining of right rib pain. The patient is not able to provide good history because of her dementia but the son who was present at the bedside stated that the patient is more confused than usual; and usually when the patient is confused, it is usually due to urinary tract infection. When the patient arrived at the emergency room trauma series imaging studies was performed. There was no evidence of fracture but the patient's urinalysis is consistent with urinary tract infection. The patient was started on antibiotics and was referred to the hospitalist service for admission. There was no history of fever, rigors or chills reported. The CT of the chest shows right kidney stone which was discussed with the urologist on-call and no intervention was advised. PAST MEDICAL HISTORY:  1. Dementia. 2.  Dyslipidemia. 3.  Chronic kidney disease. 4.  Status post CVA with expressive aphasia.     ALLERGIES:  THE PATIENT IS ALLERGIC TO CEFTRIAXONE, NORCO, STATINS, AND culture. 7.  Rhabdomyolysis: This is as a result of the patient being found on the floor. We will carry out gentle fluid hydration. We will repeat CK level. We will also check a TSH level. 8.  Leukocytosis: This may be due to urinary tract infection. We will await result of CT scan of the abdomen and pelvis to evaluate the patient for intra-abdominal process. 9.  Hypothyroidism: We will continue with Synthroid and check a TSH level. 10.  Dyslipidemia: We will also continue with preadmission medication. 11.  Dementia: We will continue supportive treatment and resume preadmission medications. OTHER ISSUES:  Code status: The patient is a full code. We will place the patient on heparin for DVT prophylaxis. FUNCTIONAL STATUS PRIOR TO ADMISSION:  The patient came from home. The patient is ambulatory with assistive device. COVID PRECAUTION:  The patient was wearing a face mask. I was wearing a face mask and gloves for this patient's encounter.         Maris Vidal MD      [id number]  D:  08/10/2023 5:20  T:  08/10/2023 6:43  JOB #:  [id number]  CC:  Tomas Cisneros MD

## 2023-08-10 NOTE — PROGRESS NOTES
Orders received, chart reviewed and patient evaluated by physical therapy. Pending progression with skilled acute physical therapy, recommend:  Therapy 2 days/week in the home and continued caregiver assist for safety (private duty and family currently assisting)    Recommend with nursing OOB to chair 3x/day and walking daily with one hands on assist and rolling walker. Thank you for completing as able in order to maintain patient strength, endurance and independence. Full evaluation to follow.

## 2023-08-10 NOTE — ED NOTES
Bedside shift change report given to   [address] (oncoming nurse) by Daja Woods RN (offgoing nurse). Report included the following information ED Encounter Summary, ED SBAR, MAR, and Recent Results.         Anthony Perez RN  08/10/23 9599

## 2023-08-10 NOTE — PROGRESS NOTES
[address] [facility]    Hospitalist Progress Note      NAME: Raymundo Rao   :  1935  MRM:  [id number]    Date/Time of service 8/10/2023  10:11 AM    To assist coordination of care and communication with nursing and staff, this note may be preliminary early in the day, but finalized by end of the day. Total time spent with patient: 45 Minutes I personally rounded from 9:25 to 10:10 AM, in addition to the time spent by my partner, Dr Jacki Benítez, earlier today. I personally reviewed chart, notes, data and current medications in the medical record. I have personally examined and treated the patient at bedside during this period. I personally made additional diagnoses, placed additional orders and had additional discussions. Assessment and Plan:     VIRGIL (acute kidney injury) / CKD (chronic kidney disease) stage 3 / Dehydration - POA, likely due to poor po intake of liquids, due to dementia. Hydrate. Debilitated patient / Fall / Ambulatory dysfunction - POA, chronic issue. PT eval.  Likely back to home ASAP with or without  PT. Minimize narcotics, continue lidoderm, resume NSAIDS    Dilated renal pelvis / Nephrolithiasis / Pyuria - POA. Urology consulted to comment on stone, but likely requires no intervention. UA without Nitrite nor bacteria, and no current SIRS critieria. Recheck straight cath UA. ER gave Abx. I doubt UTI    Acute delirium / Hx of completed stroke / Dementia - POA, worse than baseline due to VIRGIL and hospitalization. Return to home ASAP. Continue donepezil, lexapro, memantine, haldol, ASA, statin    Hypothyroidism - Continue synthroid  and check TSH      Hx Hypertension - Hold meds    Hiatal hernia - GERD if symptoms    Leukocytosis - POA but resolved. I doubt Sepsis. More likely just reactive. Check procalcitonin. Monitor cx.        Subjective:     Chief Complaint:  confused    ROS:  (bold if positive, if negative)    Tolerating some PT  Tolerating Diet

## 2023-08-10 NOTE — PLAN OF CARE
Problem: Physical Therapy - Adult  Goal: By Discharge: Performs mobility at highest level of function for planned discharge setting. See evaluation for individualized goals. FUNCTIONAL STATUS PRIOR TO ADMISSION: Patient ambulated inside the home using a RW or furniture and walls for support, relies on a cane and one hand held assist in the community, h/o falls and memory impairment. Pt's daughter in law, an occupational therapist, states pt and her spouse (also w/ memory impairment) usually manage fairly well in their home environment with the assistance available to them. HOME SUPPORT PRIOR TO ADMISSION: The patient lived with her spouse who has dementia, they have private duty caregivers 7 hrs/ day and family assisting at other times. Physical Therapy Goals  Initiated 8/10/2023  1. Patient will move from supine to sit and sit to supine in bed with supervision/set-up within 7 day(s). 2.  Patient will perform sit to stand with supervision/set-up within 7 day(s). 3.  Patient will transfer from bed to chair and chair to bed with supervision/set-up using the least restrictive device within 7 day(s). 4.  Patient will ambulate with minimal assistance for 100 feet with the least restrictive device within 7 day(s). Outcome: Progressing     PHYSICAL THERAPY EVALUATION    Patient: Regina Crespo (89 y.o. female)  Date: 8/10/2023  Primary Diagnosis: Kidney stone [N20.0]  Acute delirium [R41.0]  Acute cystitis without hematuria [N30.00]  Fall, initial encounter [W19. XXXA]  Contusion of rib on right side, initial encounter [S20.211A]       Precautions: Fall Risk                    ASSESSMENT :   The patient presents with impaired functional mobility s/p admission after a fall in her home. Pt's private duty caregiver found her on the floor when she arrived this morning. They do not know how long pt was on the floor.   Currently pt's mobility is limited by pain (rib area, CT neg for fxs), balance (baseline), generalized weakness, and decreased activity tolerance. In additional pt has memory impairment. Received pt resting in bed, oriented to self and being in the hosp, unable to provide details re: her fall and PLOF. Pt's daughter in law informed this writer (phone conversation), the pt lives with her spouse, has private duty caregivers 7 hrs/ day M-F, family assist at other times. Pt usually manages fine in the home relying on furniture and walls to ambulate, cane and hand held assist in the community. Today pt transitioned to EOB sitting on her own/ supervision, CGA sit<->stand and Min A ambulating 40 ft with the RW. Gait was slow, altered, and unsteady and pt voicing her fear of falling. She required greater assist and increased time returning to bed d/t increased pain in the rib area, resolved w/ rest.      Pt is below her stated baseline. She may benefit from skilled intervention to address the above impairments. Anticipate home with HHPT and caregivers. Of note, pt's dtr in law, an OT, states pt responds well to physical therapy in the home, does not make the same gains with OT & SLP. Patient Vitals    Pulse BP Patient Position   08/10/23 1334 90 137/76 Sitting after walking 40 ft   08/10/23 1328 73 139/62 Sitting (rest)   08/10/23 1322 73 (!) 131/56 Semi fowlers (rest)     Patient will benefit from skilled intervention to address the above impairments. Functional Outcome Measure: The patient scored 17 on the [facility] outcome measure which is indicative of higher odds of discharging home with home health or need of SNF/IPR. Jimmie Closs PLAN :  Recommendations and Planned Interventions:   bed mobility training, transfer training, gait training, therapeutic exercises, patient and family training/education, and therapeutic activities    Frequency/Duration: Patient will be followed by physical therapy 5 times/week to address goals.     Recommendation for discharge: (in order for the

## 2023-08-10 NOTE — PROGRESS NOTES
Physician Progress Note      PATIENT:               Chava Ceja  CSN #:                  [id number]  :                       1935  ADMIT DATE:       2023 6:38 PM  [address] [facility] DATE:  RESPONDING  PROVIDER #: Alexandra Garcia MD          QUERY TEXT:    Patient admitted with confusion. Documentation reflects Rhabdomyolysis. If   possible, please document in the progress notes and discharge summary if   Rhabdomyolysis was: The medical record reflects the following:  Risk Factors:  Rhabdomyolysis  VIRGIL  delirium    Clinical Indicators:  H&P  Rhabdomyolysis: This is as a result of the patient being found on the floor. We will carry out gentle fluid hydration. We will repeat CK level. We will   also check a TSH level    ck 199  335    Treatment:  IVF at 77  500cc NS bolus  monitor labs      Thank you,  Bianca Rivera RN [facility]  Clinical Documentation  [phone] or via Perfect Serve  Options provided:  -- Rhabdomyolysis confirmed after study  -- Rhabdomyolysis ruled out after study  -- Other - I will add my own diagnosis  -- Disagree - Not applicable / Not valid  -- Disagree - Clinically unable to determine / Unknown  -- Refer to Clinical Documentation Reviewer    PROVIDER RESPONSE TEXT:    Rhabdomyolysis ruled out after study.     Query created by: Bianca Rivera on 8/10/2023 10:52 AM      Electronically signed by:  Alexandra Garcia MD 8/10/2023 2:02 PM

## 2023-08-10 NOTE — DISCHARGE INSTRUCTIONS
Discharge Instructions       PATIENT ID: Jacky Felix  MRN: [id number]   YOB: 1935    DATE OF ADMISSION: 8/9/2023   DATE OF DISCHARGE: 8/17/2023    PRIMARY CARE PROVIDER: Casey Mackenzie     ATTENDING PHYSICIAN: Colen Sicard, MD   DISCHARGING PROVIDER: ISAIAH Craft NP    To contact this individual call [phone] and ask the  to page. If unavailable ask to be transferred the Adult Hospitalist Department. DISCHARGE DIAGNOSES nephrolithiasis, acute kidney injury, acute delirium    CONSULTATIONS: Urology, Neurology, Cardiology    PROCEDURES/SURGERIES: * No surgery found *    PENDING TEST RESULTS:   At the time of discharge the following test results are still pending: none    FOLLOW UP APPOINTMENTS:    Follow-up Information       Follow up With Specialties Details Why Denis Figueroa MD Family Medicine Schedule an appointment as soon as possible for a visit in 1 week(s)  [address] [place] [address] Dr. Ashok Matias. [address] [place] [address] And [address] [place] [address].    Santiago Evangelista MD Urology Follow up Please call to schedule a follow up appointment to evaluate kidney stone. [address] [facility] [address]  [phone]              ADDITIONAL CARE RECOMMENDATIONS:   Please schedule a follow-up appointment with your primary care doctor in 1-2 weeks. You should have repeat lab work done at this appointment (please check potassium, phosphorus, and thyroid function). You will need to call Dr. Carlos Enrique Rubio office to schedule a follow-up appointment regarding your kidney stone. You were started on amlodipine in the hospital and the dose was increased on 8/17. Please check your blood pressure before taking this and do not take if your systolic blood pressure (top number) is less than 110. Continue to work with physical and occupational therapy.   Continue to take your

## 2023-08-11 LAB
ALBUMIN SERPL-MCNC: 2.8 G/DL (ref 3.5–5)
ALBUMIN/GLOB SERPL: 1.1 (ref 1.1–2.2)
ALP SERPL-CCNC: 65 U/L (ref 45–117)
ALT SERPL-CCNC: 24 U/L (ref 12–78)
ANION GAP SERPL CALC-SCNC: 5 MMOL/L (ref 5–15)
AST SERPL-CCNC: 33 U/L (ref 15–37)
BACTERIA SPEC CULT: NORMAL
BILIRUB SERPL-MCNC: 0.7 MG/DL (ref 0.2–1)
BUN SERPL-MCNC: 24 MG/DL (ref 6–20)
BUN/CREAT SERPL: 20 (ref 12–20)
CALCIUM SERPL-MCNC: 8.3 MG/DL (ref 8.5–10.1)
CC UR VC: NORMAL
CHLORIDE SERPL-SCNC: 113 MMOL/L (ref 97–108)
CO2 SERPL-SCNC: 24 MMOL/L (ref 21–32)
CREAT SERPL-MCNC: 1.21 MG/DL (ref 0.55–1.02)
ERYTHROCYTE [DISTWIDTH] IN BLOOD BY AUTOMATED COUNT: 14.2 % (ref 11.5–14.5)
GLOBULIN SER CALC-MCNC: 2.6 G/DL (ref 2–4)
GLUCOSE SERPL-MCNC: 99 MG/DL (ref 65–100)
HCT VFR BLD AUTO: 33.6 % (ref 35–47)
HGB BLD-MCNC: 11 G/DL (ref 11.5–16)
MAGNESIUM SERPL-MCNC: 2 MG/DL (ref 1.6–2.4)
MCH RBC QN AUTO: 33.7 PG (ref 26–34)
MCHC RBC AUTO-ENTMCNC: 32.7 G/DL (ref 30–36.5)
MCV RBC AUTO: 103.1 FL (ref 80–99)
NRBC # BLD: 0 K/UL (ref 0–0.01)
NRBC BLD-RTO: 0 PER 100 WBC
PHOSPHATE SERPL-MCNC: 2.5 MG/DL (ref 2.6–4.7)
PLATELET # BLD AUTO: 131 K/UL (ref 150–400)
PMV BLD AUTO: 12.1 FL (ref 8.9–12.9)
POTASSIUM SERPL-SCNC: 3.4 MMOL/L (ref 3.5–5.1)
PROT SERPL-MCNC: 5.4 G/DL (ref 6.4–8.2)
RBC # BLD AUTO: 3.26 M/UL (ref 3.8–5.2)
SERVICE CMNT-IMP: NORMAL
SODIUM SERPL-SCNC: 142 MMOL/L (ref 136–145)
WBC # BLD AUTO: 8.1 K/UL (ref 3.6–11)

## 2023-08-11 PROCEDURE — 1200000000 HC SEMI PRIVATE

## 2023-08-11 PROCEDURE — 2580000003 HC RX 258: Performed by: INTERNAL MEDICINE

## 2023-08-11 PROCEDURE — 83735 ASSAY OF MAGNESIUM: CPT

## 2023-08-11 PROCEDURE — 6360000002 HC RX W HCPCS: Performed by: INTERNAL MEDICINE

## 2023-08-11 PROCEDURE — 36415 COLL VENOUS BLD VENIPUNCTURE: CPT

## 2023-08-11 PROCEDURE — 84100 ASSAY OF PHOSPHORUS: CPT

## 2023-08-11 PROCEDURE — 6370000000 HC RX 637 (ALT 250 FOR IP): Performed by: INTERNAL MEDICINE

## 2023-08-11 PROCEDURE — 97530 THERAPEUTIC ACTIVITIES: CPT

## 2023-08-11 PROCEDURE — 85027 COMPLETE CBC AUTOMATED: CPT

## 2023-08-11 PROCEDURE — 80053 COMPREHEN METABOLIC PANEL: CPT

## 2023-08-11 RX ORDER — OXYCODONE HYDROCHLORIDE 5 MG/1
2.5 TABLET ORAL EVERY 4 HOURS PRN
Status: DISCONTINUED | OUTPATIENT
Start: 2023-08-11 | End: 2023-08-17 | Stop reason: HOSPADM

## 2023-08-11 RX ORDER — LIDOCAINE 4 G/G
1 PATCH TOPICAL DAILY
Status: DISCONTINUED | OUTPATIENT
Start: 2023-08-11 | End: 2023-08-17 | Stop reason: HOSPADM

## 2023-08-11 RX ADMIN — LEVOTHYROXINE SODIUM 75 MCG: 0.07 TABLET ORAL at 06:26

## 2023-08-11 RX ADMIN — SODIUM CHLORIDE: 900 INJECTION, SOLUTION INTRAVENOUS at 12:08

## 2023-08-11 RX ADMIN — MEMANTINE HYDROCHLORIDE 10 MG: 10 TABLET ORAL at 08:24

## 2023-08-11 RX ADMIN — ESCITALOPRAM OXALATE 20 MG: 10 TABLET ORAL at 08:24

## 2023-08-11 RX ADMIN — HEPARIN SODIUM 5000 UNITS: 5000 INJECTION INTRAVENOUS; SUBCUTANEOUS at 06:26

## 2023-08-11 RX ADMIN — SODIUM CHLORIDE, PRESERVATIVE FREE 10 ML: 5 INJECTION INTRAVENOUS at 08:24

## 2023-08-11 RX ADMIN — MEMANTINE HYDROCHLORIDE 10 MG: 10 TABLET ORAL at 21:37

## 2023-08-11 RX ADMIN — DONEPEZIL HYDROCHLORIDE 5 MG: 5 TABLET, FILM COATED ORAL at 21:37

## 2023-08-11 RX ADMIN — OXYCODONE HYDROCHLORIDE 5 MG: 5 TABLET ORAL at 12:09

## 2023-08-11 NOTE — PROGRESS NOTES
Planning    Prophylaxis:  Hep SQ and H2B/PPI    Disposition:  Home w/Family           ___________________________________________________    Attending Physician: Tri Sultana MD

## 2023-08-11 NOTE — CONSULTS
Patient: Kayleigh Pascual MRN: [id number]  SSN: [id number]    YOB: 1935  Age: 81 y.o. Sex: female            Assessment:     Kayleigh Pascual is a 81 y.o. female admitted following a GLF at home. Hx of CKD, hypothyroidism, dyslipidemia, and dementia. Urology consulted for right renal stone. Chart reviewed, pt not examined. Leukocytosis has resolved  VIRGIL resolved, Cr appears to be at baseline   UCX pending     Known large right renal stone. Right extrarenal pelvis, no hydroureter. Per notes she does not appear to symptomatic from the stone. PLAN:   Follow culture, adjust as needed. Hydrate   No urgent intervention indicated but if she becomes febrile or clinically deteriorates, please call otherwise f/u with Patsy Salas post d/c     Seen at  2019 for [facility], CT stone study impression January 0234 below in italics   1. There is a large right extrarenal pelvis without hydronephrosis or hydroureter. Within the dependent portion of the right extrarenal pelvis is a 13 mm nonobstructing calculus. 2. Multiple bilateral upper, mid and lower pole nonobstructing renal calculi. No ureteral calculi. No hydronephrosis or hydroureter. 3. Right lower pole simple renal cyst (as seen on ultrasound). Several fluid density renal cortical structures noted on the left -one of which represented a simple renal cyst on the prior ultrasound; others were not seen on ultrasound. 4. No bladder calculi. 5. Right adnexal fluid density structure measuring 3.4 cm possibly representing a bladder diverticulum or adnexal cyst. There is also a 2.7 cm structure in the left lateral perivesical region probably representing a bladder diverticulum. These findings can be evaluated with CT IVP to exclude an right adnexal cyst in this postmenopausal woman. 6. Evaluation of the renal parenchyma is limited by lack of IV contrast.    Abdomen:  1. Pan diverticulosis.   2. Atherosclerotic

## 2023-08-11 NOTE — CARE COORDINATION
Care Management Initial Assessment       RUR: 15% Medium   Readmission? No  1st IM letter given? Yes - 8/10/23  1st  letter given: NA    CM received call back from patient's son. CM introduced self and explained role. Per son, patient lives with her  in a 1 story home with about 8 steps to enter; 2 steps within the home also. Patient has private personal caregiver assistance 7 days/week from 8:30am-11:30am and 5:30pm-8:30pm. Patient has all needed DME. Son reported he would like to use [facility] [place] [facility] for [facility] PT and SN services. Will need to obtain [facility] order. Son indicated per conversation with attending, patient will remain hospitalized until Monday, 8/14. CM will continue to follow as needed. 08/11/23 6863   Service Assessment   Patient Orientation Person   Cognition Short Term Memory Deficit   History Provided By Child/Family   Primary Caregiver Private caregiver   Accompanied By/Relationship [address]; Other (Comment)  (Private personal caregivers)   [address] is: Legal Next of [address] [place] [address]   PCP Verified by CM Yes   Last Visit to PCP Within last 3 months  (Dr. Cosmo Sosa)   Prior Functional Level Assistance with the following:;Bathing;Dressing; Toileting;Feeding;Cooking;Housework   Current Functional Level Assistance with the following:;Bathing;Dressing; Toileting;Feeding;Cooking;Housework   Can patient return to prior living arrangement Yes   Ability to make needs known: Poor   Family able to assist with home care needs: Yes  (Personal caregiver assistance)   Would you like for me to discuss the discharge plan with any other family members/significant others, and if so, who?  Yes  (Son, Eneida Marley)   Financial Resources Noteleaf Resources None   Social/Functional History   Lives With Spouse   Type of [address] [place] [address] Dr Amanda villarreal   Receives Help From Family;Personal care attendant   ADL Assistance Needs assistance   Toileting

## 2023-08-11 NOTE — CARE COORDINATION
CM attempted to contact patient's son, Kasia Wang (p: [phone]) to introduce self, obtain prior level of functioning and discuss therapy HH recommendations. CM provided contact information to call CM back; awaiting return call. Weekend CM, please follow-up with son regarding DC planning.      Melanie Galvan, MSRAMIRO   [phone]

## 2023-08-11 NOTE — PLAN OF CARE
Problem: Physical Therapy - Adult  Goal: By Discharge: Performs mobility at highest level of function for planned discharge setting. See evaluation for individualized goals. Description: FUNCTIONAL STATUS PRIOR TO ADMISSION: Patient ambulated inside the home using a RW or furniture and walls for support, relies on a cane and one hand held assist in the community, h/o falls and memory impairment. Pt's daughter in law, an occupational therapist, states pt and her spouse (also w/ memory impairment) usually manage fairly well in their home environment with the assistance available to them. HOME SUPPORT PRIOR TO ADMISSION: The patient lived with her spouse who has dementia, they have private duty caregivers 7 hrs/ day and family assisting at other times. Physical Therapy Goals  Initiated 8/10/2023  1. Patient will move from supine to sit and sit to supine in bed with supervision/set-up within 7 day(s). 2.  Patient will perform sit to stand with supervision/set-up within 7 day(s). 3.  Patient will transfer from bed to chair and chair to bed with supervision/set-up using the least restrictive device within 7 day(s). 4.  Patient will ambulate with minimal assistance for 100 feet with the least restrictive device within 7 day(s). Outcome: Progressing   PHYSICAL THERAPY TREATMENT    Patient: Andres Sevilla (89 y.o. female)  Date: 8/11/2023  Diagnosis: Kidney stone [N20.0]  Acute delirium [R41.0]  Acute cystitis without hematuria [N30.00]  Fall, initial encounter [W19. XXXA]  Contusion of rib on right side, initial encounter [S20.211A] VIRGIL (acute kidney injury) ([address])      Precautions: Fall Risk       ASSESSMENT:  Patient continues to benefit from skilled PT services. Pt received in high irizarry's position and appears to be sleeping ; lunch tray in place however it did not appear that pt ate much (noted \"FEED\" written and underlined on whiteboard).  Son at bedside and reports pt ad pain meds about an hour prior to therapy arrival. Pt awakens to verbal and light tactile stimuli. Pt appears to be drowsy/ \"groggy\". Pt agreeable to sit EOB w/ son's and therapist encouragement as pt had not yet been OOB today. Session limited to EOB seated exercise and standing x2 (Mod A, RW). Presents w/ increased assist for transfers compared to previous session and pt unable to complete side steps to [facility] (minimal steps taken) w/ RW. She also required additional time and mod-max verbal/tactile cues to initiate and complete tasks. Pt returned to supine w/ Mod Ax2 (PCT assist). Pain noted w/ transfer as pt crying out and placing hand to right side (rib/sternum area). Pt HOB elevated to comfort. Pt remained in PCT care (assisting w/ feeding). Noted previous recommendations for d/c home w/ HHPT vs none pending progress. If pt caregivers are able to provide assist, continue to recommend d/c home w/ HHPT and assist from caregivers. If caregivers unable to provide current level of assist, would then recommend Rehab. Will continue to assess mobility progress and d/c disposition. PLAN:  Patient continues to benefit from skilled intervention to address the above impairments. Continue treatment per established plan of care. Recommendation for discharge: (in order for the patient to meet his/her long term goals): Therapy 2 days/week in the home and assist for safety or Continue to assess pending progress    Other factors to consider for discharge: impaired cognition, high risk for falls, not safe to be alone, and concern for safely navigating or managing the home environment    IF patient discharges home will need the following DME: patient owns DME required for discharge       SUBJECTIVE:   Patient stated, \"Yeah.\" Pt w/ limited responses. OBJECTIVE DATA SUMMARY:     Functional Mobility Training:  Bed Mobility:  Bed Mobility Training  Bed Mobility Training: Yes  Overall Level of Assistance:  Moderate assistance;Assist X1;Additional

## 2023-08-12 ENCOUNTER — HOME HEALTH ADMISSION (OUTPATIENT)
Dept: HOME HEALTH SERVICES | Facility: HOME HEALTH | Age: 88
End: 2023-08-12

## 2023-08-12 LAB
EKG ATRIAL RATE: 77 BPM
EKG DIAGNOSIS: NORMAL
EKG P AXIS: 70 DEGREES
EKG P-R INTERVAL: 152 MS
EKG Q-T INTERVAL: 494 MS
EKG QRS DURATION: 162 MS
EKG QTC CALCULATION (BAZETT): 559 MS
EKG R AXIS: -40 DEGREES
EKG T AXIS: 111 DEGREES
EKG VENTRICULAR RATE: 77 BPM

## 2023-08-12 PROCEDURE — 2580000003 HC RX 258: Performed by: INTERNAL MEDICINE

## 2023-08-12 PROCEDURE — 93010 ELECTROCARDIOGRAM REPORT: CPT | Performed by: SPECIALIST

## 2023-08-12 PROCEDURE — 6370000000 HC RX 637 (ALT 250 FOR IP): Performed by: INTERNAL MEDICINE

## 2023-08-12 PROCEDURE — 99222 1ST HOSP IP/OBS MODERATE 55: CPT | Performed by: PSYCHIATRY & NEUROLOGY

## 2023-08-12 PROCEDURE — 1200000000 HC SEMI PRIVATE

## 2023-08-12 RX ORDER — ACETAMINOPHEN 325 MG/1
650 TABLET ORAL EVERY 4 HOURS PRN
Status: DISCONTINUED | OUTPATIENT
Start: 2023-08-12 | End: 2023-08-17 | Stop reason: HOSPADM

## 2023-08-12 RX ORDER — LIDOCAINE 50 MG/G
1 PATCH TOPICAL DAILY
Qty: 10 PATCH | Refills: 0 | Status: SHIPPED | OUTPATIENT
Start: 2023-08-12 | End: 2023-08-17 | Stop reason: SDUPTHER

## 2023-08-12 RX ADMIN — SODIUM CHLORIDE, PRESERVATIVE FREE 10 ML: 5 INJECTION INTRAVENOUS at 14:36

## 2023-08-12 RX ADMIN — SODIUM CHLORIDE: 900 INJECTION, SOLUTION INTRAVENOUS at 16:47

## 2023-08-12 RX ADMIN — ACETAMINOPHEN 650 MG: 325 TABLET ORAL at 14:32

## 2023-08-12 RX ADMIN — ESCITALOPRAM OXALATE 20 MG: 10 TABLET ORAL at 10:13

## 2023-08-12 RX ADMIN — LEVOTHYROXINE SODIUM 75 MCG: 0.07 TABLET ORAL at 06:47

## 2023-08-12 RX ADMIN — DONEPEZIL HYDROCHLORIDE 5 MG: 5 TABLET, FILM COATED ORAL at 22:45

## 2023-08-12 RX ADMIN — MEMANTINE HYDROCHLORIDE 10 MG: 10 TABLET ORAL at 10:13

## 2023-08-12 ASSESSMENT — PAIN DESCRIPTION - ORIENTATION
ORIENTATION: RIGHT
ORIENTATION: RIGHT

## 2023-08-12 ASSESSMENT — PAIN SCALES - GENERAL
PAINLEVEL_OUTOF10: 4
PAINLEVEL_OUTOF10: 1

## 2023-08-12 ASSESSMENT — PAIN DESCRIPTION - LOCATION
LOCATION: RIB CAGE
LOCATION: RIB CAGE

## 2023-08-12 NOTE — CONSULTS
Consult dictated. 81-year-old female with CKD, dementia who was admitted after a fall/lethargy, confusion and she was diagnosed with acute delirium secondary to acute cystitis with hematuria, acute on chronic renal insufficiency and decompensation of dementia. Was treated with antibiotics. She was planned to be discharged today but her mentation appeared worse and she seemed more confused. On exam she is alert, awake, not fully oriented but is able to answer most of the simple questions. She could not tell me today's date but knows the date of birth. Cannot tell me the name of this place and could not name all of her children. Inconsistent with following commands. No focal motor deficit noted in any of the extremities. I suspect that this is lingering delirium/metabolic encephalopathy possibly due to underlying infection and narcotic use may have played a role especially in the setting of renal insufficiency. Treatment essentially is supportive and giving her more time. Holding off on any additional work-up for now.   Jennifer Barth MD

## 2023-08-12 NOTE — CARE COORDINATION
RUR: 15%   DISPO: Home with Kimberly; AVS Updated   [facility] Medicare Letter: Provided; Family may appeal discharge   Barrier: Medical, Awaiting Neuro consult ,     12:54 pm  Discharge postponed; Neuro Consult pending     10:24am   Per conversation with the pt's son; This cm met him at bedside to discuss [place] planning. This cm reviewed over the recs given by therapy for HHPT. He reported that he was interested in a referral going to [facility]. He reported that prior to the pt discharging he would like to speak with the attending to inquire about the pt's \"Kidney Stones\". This cm informed him that he would send a message to him. This cm provided them with the pt's [facility] Medicare letter and informed him that he would be able to appeal the discharge if he felt the pt was discharging too soon. He reported that he would discuss it with the pt and his spouse. This cm informed him that he would follow with them shortly.

## 2023-08-12 NOTE — DISCHARGE SUMMARY
Physician Discharge Summary     Patient ID:  Idalia Graham  [id number]  81 y.o.  1935    Admit date: 8/9/2023    Discharge date of service and time: 8/14/2023  Greater than 30 minutes were spent providing discharge related services for this patient    Admission Diagnoses: Kidney stone [N20.0]  Acute delirium [R41.0]  Acute cystitis without hematuria [N30.00]  Fall, initial encounter [W19. XXXA]  Contusion of rib on right side, initial encounter [S20.211A]    Discharge Diagnoses:    Principal Diagnosis   VIRGIL (acute kidney injury) [facility])                                             Hospital Course and other diagnoses  VIRGIL (acute kidney injury) / CKD (chronic kidney disease) stage 3 / Dehydration - POA, likely due to poor po intake of liquids, due to dementia. Hydrated and improved. Debilitated patient / Fall / Ambulatory dysfunction - POA, chronic issue. Worse for 48 hours. Repeat PT eval.  Can DC back to home with [address] PT when better. Check again today. No narcotics, continue lidoderm, resume NSAIDS. SNF is alternative if not strong enough for [address]     Dilated renal pelvis / Nephrolithiasis / Pyuria - POA. Urology consulted, but requires no urgent intervention. UA without Nitrite nor bacteria, and no current SIRS critieria. Rechecked straight cath UA, but it was equally contaminated. ER gave Abx. Both of the Urine cx had low count mixed micheal. NOT UTI. No Abx needed     Acute delirium / Hx of completed stroke / Dementia - POA, worse than baseline due to VIRGIL and hospitalization. Worse for the first 48 hours, I suspect due to narcotic pain control, sundowning in setting of dementia. Neurology consult appreciated and they concur. Now better. Continue donepezil, lexapro, memantine, haldol, ASA, statin     Hypothyroidism - Continue synthroid.  Slightly elevated TSH      Hx Hypertension - Started some Norvasc low dose     Hiatal hernia - No symptoms     Hyperlipidemia - No hx of CAD nor CVA to demand tight by mouth      escitalopram (LEXAPRO) 20 MG tablet Take 20 mg by mouth daily      haloperidol (HALDOL) 0.5 MG tablet Take 0.5 mg by mouth      levothyroxine (SYNTHROID) 75 MCG tablet Take 75 mcg by mouth every morning (before breakfast)      memantine ER (NAMENDA XR) 28 MG CP24 extended release capsule Take 28 mg by mouth daily      senna (SENOKOT) 8.6 MG tablet Take 1 tablet by mouth daily           STOP taking these medications       atorvastatin (LIPITOR) 40 MG tablet Comments:   Reason for Stopping:             Activity: activity as tolerated  Diet: regular diet  Wound Care: none needed    Follow-up with your PCP in a few weeks.   Follow-up tests/labs - none    Signed:  Kacie Sharma MD  8/14/2023  12:08 PM

## 2023-08-12 NOTE — PLAN OF CARE
2000: Bedside shift change report given to [facility] (oncoming nurse) by Rikki Whitfield (offgoing nurse). Report included the following information SBAR, Kardex, Intake/Output, MAR. Shift Summary:    No acute events occurred over the course of the shift     Pt resting comfortably in bed    0730: Bedside shift change report given to Davida Arciniega (oncoming nurse) by [facility] (offgoing nurse). Report included the following information SBAR, Kardex, Intake/Output, MAR. Problem: Discharge Planning  Goal: Discharge to home or other facility with appropriate resources  8/11/2023 2350 by Sridhar Monroy RN  Outcome: Progressing  8/11/2023 2049 by Veronika Cerda RN  Outcome: Progressing     Problem: Safety - Adult  Goal: Free from fall injury  8/11/2023 2350 by Sridhar Monroy RN  Outcome: Progressing  8/11/2023 2049 by Veronika Cerda RN  Outcome: Progressing     Problem: Physical Therapy - Adult  Goal: By Discharge: Performs mobility at highest level of function for planned discharge setting. See evaluation for individualized goals. Description: FUNCTIONAL STATUS PRIOR TO ADMISSION: Patient ambulated inside the home using a RW or furniture and walls for support, relies on a cane and one hand held assist in the community, h/o falls and memory impairment. Pt's daughter in law, an occupational therapist, states pt and her spouse (also w/ memory impairment) usually manage fairly well in their home environment with the assistance available to them. HOME SUPPORT PRIOR TO ADMISSION: The patient lived with her spouse who has dementia, they have private duty caregivers 7 hrs/ day and family assisting at other times. Physical Therapy Goals  Initiated 8/10/2023  1. Patient will move from supine to sit and sit to supine in bed with supervision/set-up within 7 day(s). 2.  Patient will perform sit to stand with supervision/set-up within 7 day(s).   3.  Patient will transfer from bed to chair and chair to bed with supervision/set-up

## 2023-08-12 NOTE — PLAN OF CARE
Problem: Discharge Planning  Goal: Discharge to home or other facility with appropriate resources  Outcome: Progressing     Problem: Safety - Adult  Goal: Free from fall injury  Outcome: Progressing     Problem: Physical Therapy - Adult  Goal: By Discharge: Performs mobility at highest level of function for planned discharge setting. See evaluation for individualized goals. Description: FUNCTIONAL STATUS PRIOR TO ADMISSION: Patient ambulated inside the home using a RW or furniture and walls for support, relies on a cane and one hand held assist in the community, h/o falls and memory impairment. Pt's daughter in law, an occupational therapist, states pt and her spouse (also w/ memory impairment) usually manage fairly well in their home environment with the assistance available to them. HOME SUPPORT PRIOR TO ADMISSION: The patient lived with her spouse who has dementia, they have private duty caregivers 7 hrs/ day and family assisting at other times. Physical Therapy Goals  Initiated 8/10/2023  1. Patient will move from supine to sit and sit to supine in bed with supervision/set-up within 7 day(s). 2.  Patient will perform sit to stand with supervision/set-up within 7 day(s). 3.  Patient will transfer from bed to chair and chair to bed with supervision/set-up using the least restrictive device within 7 day(s). 4.  Patient will ambulate with minimal assistance for 100 feet with the least restrictive device within 7 day(s).    8/11/2023 1357 by Patricia Muhammad PTA  Outcome: Progressing     Problem: Chronic Conditions and Co-morbidities  Goal: Patient's chronic conditions and co-morbidity symptoms are monitored and maintained or improved  Outcome: Progressing

## 2023-08-13 LAB
ALBUMIN SERPL-MCNC: 2.8 G/DL (ref 3.5–5)
ALBUMIN/GLOB SERPL: 0.8 (ref 1.1–2.2)
ALP SERPL-CCNC: 71 U/L (ref 45–117)
ALT SERPL-CCNC: 23 U/L (ref 12–78)
ANION GAP SERPL CALC-SCNC: 6 MMOL/L (ref 5–15)
AST SERPL-CCNC: 24 U/L (ref 15–37)
BASOPHILS # BLD: 0 K/UL (ref 0–0.1)
BASOPHILS NFR BLD: 1 % (ref 0–1)
BILIRUB SERPL-MCNC: 0.6 MG/DL (ref 0.2–1)
BUN SERPL-MCNC: 16 MG/DL (ref 6–20)
BUN/CREAT SERPL: 16 (ref 12–20)
CALCIUM SERPL-MCNC: 8.5 MG/DL (ref 8.5–10.1)
CHLORIDE SERPL-SCNC: 112 MMOL/L (ref 97–108)
CO2 SERPL-SCNC: 25 MMOL/L (ref 21–32)
CREAT SERPL-MCNC: 1.01 MG/DL (ref 0.55–1.02)
DIFFERENTIAL METHOD BLD: ABNORMAL
EOSINOPHIL # BLD: 0.4 K/UL (ref 0–0.4)
EOSINOPHIL NFR BLD: 5 % (ref 0–7)
ERYTHROCYTE [DISTWIDTH] IN BLOOD BY AUTOMATED COUNT: 13.5 % (ref 11.5–14.5)
GLOBULIN SER CALC-MCNC: 3.4 G/DL (ref 2–4)
GLUCOSE SERPL-MCNC: 119 MG/DL (ref 65–100)
HCT VFR BLD AUTO: 35.9 % (ref 35–47)
HGB BLD-MCNC: 11.6 G/DL (ref 11.5–16)
IMM GRANULOCYTES # BLD AUTO: 0 K/UL (ref 0–0.04)
IMM GRANULOCYTES NFR BLD AUTO: 0 % (ref 0–0.5)
LYMPHOCYTES # BLD: 1.2 K/UL (ref 0.8–3.5)
LYMPHOCYTES NFR BLD: 15 % (ref 12–49)
MAGNESIUM SERPL-MCNC: 2 MG/DL (ref 1.6–2.4)
MCH RBC QN AUTO: 33.5 PG (ref 26–34)
MCHC RBC AUTO-ENTMCNC: 32.3 G/DL (ref 30–36.5)
MCV RBC AUTO: 103.8 FL (ref 80–99)
MONOCYTES # BLD: 1.2 K/UL (ref 0–1)
MONOCYTES NFR BLD: 15 % (ref 5–13)
NEUTS SEG # BLD: 5.2 K/UL (ref 1.8–8)
NEUTS SEG NFR BLD: 64 % (ref 32–75)
NRBC # BLD: 0 K/UL (ref 0–0.01)
NRBC BLD-RTO: 0 PER 100 WBC
PHOSPHATE SERPL-MCNC: 1.9 MG/DL (ref 2.6–4.7)
PLATELET # BLD AUTO: 147 K/UL (ref 150–400)
PMV BLD AUTO: 11.8 FL (ref 8.9–12.9)
POTASSIUM SERPL-SCNC: 2.8 MMOL/L (ref 3.5–5.1)
PROT SERPL-MCNC: 6.2 G/DL (ref 6.4–8.2)
RBC # BLD AUTO: 3.46 M/UL (ref 3.8–5.2)
SODIUM SERPL-SCNC: 143 MMOL/L (ref 136–145)
WBC # BLD AUTO: 8 K/UL (ref 3.6–11)

## 2023-08-13 PROCEDURE — 2580000003 HC RX 258: Performed by: INTERNAL MEDICINE

## 2023-08-13 PROCEDURE — 6370000000 HC RX 637 (ALT 250 FOR IP): Performed by: INTERNAL MEDICINE

## 2023-08-13 PROCEDURE — 1200000000 HC SEMI PRIVATE

## 2023-08-13 PROCEDURE — 80053 COMPREHEN METABOLIC PANEL: CPT

## 2023-08-13 PROCEDURE — 83735 ASSAY OF MAGNESIUM: CPT

## 2023-08-13 PROCEDURE — 85025 COMPLETE CBC W/AUTO DIFF WBC: CPT

## 2023-08-13 PROCEDURE — 84100 ASSAY OF PHOSPHORUS: CPT

## 2023-08-13 PROCEDURE — 36415 COLL VENOUS BLD VENIPUNCTURE: CPT

## 2023-08-13 RX ORDER — AMLODIPINE BESYLATE 5 MG/1
5 TABLET ORAL DAILY
Status: DISCONTINUED | OUTPATIENT
Start: 2023-08-13 | End: 2023-08-17

## 2023-08-13 RX ORDER — AMLODIPINE BESYLATE 5 MG/1
5 TABLET ORAL DAILY
Qty: 30 TABLET | Refills: 0 | Status: SHIPPED | OUTPATIENT
Start: 2023-08-14 | End: 2023-08-17 | Stop reason: HOSPADM

## 2023-08-13 RX ADMIN — LEVOTHYROXINE SODIUM 75 MCG: 0.07 TABLET ORAL at 06:27

## 2023-08-13 RX ADMIN — MEMANTINE HYDROCHLORIDE 10 MG: 10 TABLET ORAL at 21:45

## 2023-08-13 RX ADMIN — ACETAMINOPHEN 650 MG: 325 TABLET ORAL at 06:35

## 2023-08-13 RX ADMIN — SODIUM CHLORIDE, PRESERVATIVE FREE 10 ML: 5 INJECTION INTRAVENOUS at 21:57

## 2023-08-13 RX ADMIN — POTASSIUM & SODIUM PHOSPHATES POWDER PACK 280-160-250 MG 250 MG: 280-160-250 PACK at 13:40

## 2023-08-13 RX ADMIN — ACETAMINOPHEN 650 MG: 325 TABLET ORAL at 13:39

## 2023-08-13 RX ADMIN — DONEPEZIL HYDROCHLORIDE 5 MG: 5 TABLET, FILM COATED ORAL at 21:45

## 2023-08-13 RX ADMIN — AMLODIPINE BESYLATE 5 MG: 5 TABLET ORAL at 08:47

## 2023-08-13 RX ADMIN — POTASSIUM & SODIUM PHOSPHATES POWDER PACK 280-160-250 MG 250 MG: 280-160-250 PACK at 22:00

## 2023-08-13 RX ADMIN — SODIUM CHLORIDE: 900 INJECTION, SOLUTION INTRAVENOUS at 17:08

## 2023-08-13 RX ADMIN — MEMANTINE HYDROCHLORIDE 10 MG: 10 TABLET ORAL at 01:13

## 2023-08-13 RX ADMIN — ESCITALOPRAM OXALATE 20 MG: 10 TABLET ORAL at 08:48

## 2023-08-13 RX ADMIN — SODIUM CHLORIDE: 900 INJECTION, SOLUTION INTRAVENOUS at 04:40

## 2023-08-13 RX ADMIN — MEMANTINE HYDROCHLORIDE 10 MG: 10 TABLET ORAL at 08:47

## 2023-08-13 ASSESSMENT — PAIN DESCRIPTION - LOCATION
LOCATION: ABDOMEN
LOCATION: RIB CAGE

## 2023-08-13 ASSESSMENT — PAIN DESCRIPTION - ORIENTATION
ORIENTATION: RIGHT
ORIENTATION: RIGHT

## 2023-08-13 ASSESSMENT — PAIN DESCRIPTION - DESCRIPTORS: DESCRIPTORS: ACHING

## 2023-08-13 ASSESSMENT — PAIN SCALES - GENERAL: PAINLEVEL_OUTOF10: 5

## 2023-08-13 NOTE — PROGRESS NOTES
[address] [facility]    Hospitalist Progress Note      NAME: Jose Rai   :  1935  MRM:  [id number]    Date/Time of service 2023  10:22 AM    To assist coordination of care and communication with nursing and staff, this note may be preliminary early in the day, but finalized by end of the day. Assessment and Plan:     VIRGIL (acute kidney injury) / CKD (chronic kidney disease) stage 3 / Dehydration - POA, likely due to poor po intake of liquids, due to dementia. Hydrated and improved. Check labs again today     Debilitated patient / Fall / Ambulatory dysfunction - POA, chronic issue. Worse for 48 hours. Repeat PT eval.  Can DC back to home with [facility] PT when better. Check again today. No narcotics, continue lidoderm, resume NSAIDS     Dilated renal pelvis / Nephrolithiasis / Pyuria - POA. Urology consulted, but requires no urgent intervention. UA without Nitrite nor bacteria, and no current SIRS critieria. Rechecked straight cath UA, but it was equally contaminated. ER gave Abx. Both of the Urine cx had low count mixed micheal. NOT UTI. No Abx needed     Acute delirium / Hx of completed stroke / Dementia - POA, worse than baseline due to VIRGIL and hospitalization. Worse for 48 hours, I suspect due to narcotic pain control,  in setting of dementia. Neurology consult appreciated and they concur. Continue donepezil, lexapro, memantine, haldol, ASA, statin     Hypothyroidism - Continue synthroid. Slightly elevated TSH      Hx Hypertension - Start some Norvasc low dose     Hiatal hernia - No symptoms    Hyperlipidemia - No hx of CAD nor CVA to demand tight control. Stopped Statin. Leukocytosis - POA but resolved. I doubt Sepsis. More likely just reactive. NGTD on cx.   Check again today       Subjective:     Chief Complaint:  confused, weaker than baseline, but better than yesterday    ROS:  (bold if positive, if negative)    Tolerating some PT  Tolerating some Diet spent with patient: 28 Minutes I personally reviewed chart, notes, data and current medications in the medical record. I have personally examined and treated the patient at bedside during this period.                   Care Plan discussed with: Patient, Nursing Staff, and >50% of time spent in counseling and coordination of care    Discussed:  Care Plan and D/C Planning    Prophylaxis:  Hep SQ and H2B/PPI    Disposition:  Home w/Family           ___________________________________________________    Attending Physician: Robert Winston MD

## 2023-08-13 NOTE — CONSULTS
[address] [facility]    Name:  Michell Aviles  MR#:  [id number]  :  1935  ACCOUNT #:  [de-identified]  DATE OF SERVICE:  2023    REQUESTING PHYSICIAN:  Dr. Yamilex Perez. REASON FOR EVALUATION:  Altered mental status. HISTORY:  The patient is an 81-year-old white female with chronic renal insufficiency, dyslipidemia, and dementia, who was admitted on 08/10 after a fall at home, and she appeared more confused than usual.  She was admitted for further workup and was found to have urinary tract infection, i.e., acute cystitis with hematuria, acute-on-chronic renal insufficiency, and this was treated with antibiotics. Overall, she has been improving, and the plan was to discharge her home today, but she appeared lethargic and somewhat confused, and this was held. The patient was also found to have right-sided kidney stone and has been receiving narcotics off and on. Neurology was asked to assess. PAST MEDICAL HISTORY:  As mentioned above. HOME MEDICATIONS:  Tylenol, aspirin, Lipitor, Aricept, Lexapro, Haldol, Synthroid, Namenda. SOCIAL HISTORY:  No alcohol or tobacco use. Lives with her family. REVIEW OF SYSTEMS:  Difficult to obtain. PHYSICAL EXAMINATION:  GENERAL:  On examination, the patient is lying in bed and in no acute distress. VITAL SIGNS:  Blood pressure 135/70, pulse is 62, temperature 97.9. NEUROLOGIC:  She makes good eye contact and responds appropriately to the greeting. However, she appears somewhat confused and could not tell me if she was at home or hospital.  When given a choice, she says she is in the hospital, but does not know why and says that she may have had a stroke. She could not tell me today's date, but was able to tell me her date of birth correctly. When asked how many children she has, she says she thinks she has four, but was able to name only two. Able to follow commands, but not consistently.   When asked to show two fingers played a role in the setting of renal insufficiency. Treatment essentially is supportive and giving her more time. Holding off on any additional neurological workup for now. Expect gradual improvement over the next day or two. Please call with any questions. Thank you for this consultation.       Renae Mcleod MD      [id number]  D:  08/12/2023 15:58  T:  08/12/2023 21:25  JOB #:  [id number]

## 2023-08-14 PROCEDURE — 1200000000 HC SEMI PRIVATE

## 2023-08-14 PROCEDURE — 6370000000 HC RX 637 (ALT 250 FOR IP): Performed by: INTERNAL MEDICINE

## 2023-08-14 PROCEDURE — 6360000002 HC RX W HCPCS: Performed by: INTERNAL MEDICINE

## 2023-08-14 PROCEDURE — 97530 THERAPEUTIC ACTIVITIES: CPT

## 2023-08-14 PROCEDURE — 97116 GAIT TRAINING THERAPY: CPT

## 2023-08-14 RX ADMIN — ESCITALOPRAM OXALATE 20 MG: 10 TABLET ORAL at 08:45

## 2023-08-14 RX ADMIN — HALOPERIDOL 0.5 MG: 1 TABLET ORAL at 16:48

## 2023-08-14 RX ADMIN — MEMANTINE HYDROCHLORIDE 10 MG: 10 TABLET ORAL at 20:04

## 2023-08-14 RX ADMIN — AMLODIPINE BESYLATE 5 MG: 5 TABLET ORAL at 08:45

## 2023-08-14 RX ADMIN — POTASSIUM & SODIUM PHOSPHATES POWDER PACK 280-160-250 MG 250 MG: 280-160-250 PACK at 20:04

## 2023-08-14 RX ADMIN — MEMANTINE HYDROCHLORIDE 10 MG: 10 TABLET ORAL at 08:45

## 2023-08-14 RX ADMIN — HEPARIN SODIUM 5000 UNITS: 5000 INJECTION INTRAVENOUS; SUBCUTANEOUS at 20:04

## 2023-08-14 RX ADMIN — POTASSIUM & SODIUM PHOSPHATES POWDER PACK 280-160-250 MG 250 MG: 280-160-250 PACK at 08:46

## 2023-08-14 RX ADMIN — LEVOTHYROXINE SODIUM 75 MCG: 0.07 TABLET ORAL at 06:10

## 2023-08-14 RX ADMIN — HEPARIN SODIUM 5000 UNITS: 5000 INJECTION INTRAVENOUS; SUBCUTANEOUS at 14:16

## 2023-08-14 RX ADMIN — DONEPEZIL HYDROCHLORIDE 5 MG: 5 TABLET, FILM COATED ORAL at 20:04

## 2023-08-14 ASSESSMENT — PAIN SCALES - GENERAL
PAINLEVEL_OUTOF10: 4
PAINLEVEL_OUTOF10: 0

## 2023-08-14 NOTE — PLAN OF CARE
Problem: Physical Therapy - Adult  Goal: By Discharge: Performs mobility at highest level of function for planned discharge setting. See evaluation for individualized goals. Description: FUNCTIONAL STATUS PRIOR TO ADMISSION: Patient ambulated inside the home using a RW or furniture and walls for support, relies on a cane and one hand held assist in the community, h/o falls and memory impairment. Pt's daughter in law, an occupational therapist, states pt and her spouse (also w/ memory impairment) usually manage fairly well in their home environment with the assistance available to them. HOME SUPPORT PRIOR TO ADMISSION: The patient lived with her spouse who has dementia, they have private duty caregivers 7 hrs/ day and family assisting at other times. Physical Therapy Goals  Initiated 8/10/2023  1. Patient will move from supine to sit and sit to supine in bed with supervision/set-up within 7 day(s). 2.  Patient will perform sit to stand with supervision/set-up within 7 day(s). 3.  Patient will transfer from bed to chair and chair to bed with supervision/set-up using the least restrictive device within 7 day(s). 4.  Patient will ambulate with minimal assistance for 100 feet with the least restrictive device within 7 day(s). Outcome: Progressing     PHYSICAL THERAPY TREATMENT    Patient: Jhonatan Calle (51 y.o. female)  Date: 8/14/2023  Diagnosis: Kidney stone [N20.0]  Acute delirium [R41.0]  Acute cystitis without hematuria [N30.00]  Fall, initial encounter [W19. XXXA]  Contusion of rib on right side, initial encounter [S20.211A] VIRGIL (acute kidney injury) ([address])      Precautions: Fall Risk                    ASSESSMENT:  Patient continues to benefit from skilled PT services and is progressing towards goals. Right rib pain limiting function - especially supine to/from sit. Amb with RW and min assist - in hospital room.   Pt continues to function well below baseline and would require increased support at

## 2023-08-15 LAB
ANION GAP SERPL CALC-SCNC: 3 MMOL/L (ref 5–15)
BASOPHILS # BLD: 0 K/UL (ref 0–0.1)
BASOPHILS NFR BLD: 1 % (ref 0–1)
BUN SERPL-MCNC: 18 MG/DL (ref 6–20)
BUN/CREAT SERPL: 19 (ref 12–20)
CALCIUM SERPL-MCNC: 8.3 MG/DL (ref 8.5–10.1)
CHLORIDE SERPL-SCNC: 113 MMOL/L (ref 97–108)
CK SERPL-CCNC: 101 U/L (ref 26–192)
CO2 SERPL-SCNC: 26 MMOL/L (ref 21–32)
CREAT SERPL-MCNC: 0.94 MG/DL (ref 0.55–1.02)
DIFFERENTIAL METHOD BLD: ABNORMAL
EOSINOPHIL # BLD: 0.4 K/UL (ref 0–0.4)
EOSINOPHIL NFR BLD: 5 % (ref 0–7)
ERYTHROCYTE [DISTWIDTH] IN BLOOD BY AUTOMATED COUNT: 13.9 % (ref 11.5–14.5)
GLUCOSE SERPL-MCNC: 103 MG/DL (ref 65–100)
HCT VFR BLD AUTO: 33.8 % (ref 35–47)
HGB BLD-MCNC: 11.2 G/DL (ref 11.5–16)
IMM GRANULOCYTES # BLD AUTO: 0 K/UL (ref 0–0.04)
IMM GRANULOCYTES NFR BLD AUTO: 1 % (ref 0–0.5)
LYMPHOCYTES # BLD: 1.2 K/UL (ref 0.8–3.5)
LYMPHOCYTES NFR BLD: 14 % (ref 12–49)
MCH RBC QN AUTO: 34.1 PG (ref 26–34)
MCHC RBC AUTO-ENTMCNC: 33.1 G/DL (ref 30–36.5)
MCV RBC AUTO: 103 FL (ref 80–99)
MONOCYTES # BLD: 1.1 K/UL (ref 0–1)
MONOCYTES NFR BLD: 13 % (ref 5–13)
NEUTS SEG # BLD: 5.8 K/UL (ref 1.8–8)
NEUTS SEG NFR BLD: 66 % (ref 32–75)
NRBC # BLD: 0 K/UL (ref 0–0.01)
NRBC BLD-RTO: 0 PER 100 WBC
PHOSPHATE SERPL-MCNC: 2.2 MG/DL (ref 2.6–4.7)
PLATELET # BLD AUTO: 164 K/UL (ref 150–400)
PMV BLD AUTO: 11.4 FL (ref 8.9–12.9)
POTASSIUM SERPL-SCNC: 3.2 MMOL/L (ref 3.5–5.1)
RBC # BLD AUTO: 3.28 M/UL (ref 3.8–5.2)
SODIUM SERPL-SCNC: 142 MMOL/L (ref 136–145)
T4 FREE SERPL-MCNC: 1.6 NG/DL (ref 0.8–1.5)
TROPONIN I SERPL HS-MCNC: 34 NG/L (ref 0–51)
WBC # BLD AUTO: 8.6 K/UL (ref 3.6–11)

## 2023-08-15 PROCEDURE — 85025 COMPLETE CBC W/AUTO DIFF WBC: CPT

## 2023-08-15 PROCEDURE — 6370000000 HC RX 637 (ALT 250 FOR IP): Performed by: INTERNAL MEDICINE

## 2023-08-15 PROCEDURE — 1200000000 HC SEMI PRIVATE

## 2023-08-15 PROCEDURE — 36415 COLL VENOUS BLD VENIPUNCTURE: CPT

## 2023-08-15 PROCEDURE — 2580000003 HC RX 258: Performed by: INTERNAL MEDICINE

## 2023-08-15 PROCEDURE — 84484 ASSAY OF TROPONIN QUANT: CPT

## 2023-08-15 PROCEDURE — 6370000000 HC RX 637 (ALT 250 FOR IP)

## 2023-08-15 PROCEDURE — 97165 OT EVAL LOW COMPLEX 30 MIN: CPT

## 2023-08-15 PROCEDURE — 93005 ELECTROCARDIOGRAM TRACING: CPT

## 2023-08-15 PROCEDURE — 97116 GAIT TRAINING THERAPY: CPT

## 2023-08-15 PROCEDURE — 2500000003 HC RX 250 WO HCPCS

## 2023-08-15 PROCEDURE — 2580000003 HC RX 258

## 2023-08-15 PROCEDURE — 84100 ASSAY OF PHOSPHORUS: CPT

## 2023-08-15 PROCEDURE — 97530 THERAPEUTIC ACTIVITIES: CPT

## 2023-08-15 PROCEDURE — 84439 ASSAY OF FREE THYROXINE: CPT

## 2023-08-15 PROCEDURE — 6360000002 HC RX W HCPCS: Performed by: INTERNAL MEDICINE

## 2023-08-15 PROCEDURE — 80048 BASIC METABOLIC PNL TOTAL CA: CPT

## 2023-08-15 PROCEDURE — 82550 ASSAY OF CK (CPK): CPT

## 2023-08-15 RX ORDER — POTASSIUM CHLORIDE 750 MG/1
40 TABLET, FILM COATED, EXTENDED RELEASE ORAL ONCE
Status: COMPLETED | OUTPATIENT
Start: 2023-08-15 | End: 2023-08-15

## 2023-08-15 RX ORDER — ATORVASTATIN CALCIUM 40 MG/1
40 TABLET, FILM COATED ORAL DAILY
Status: DISCONTINUED | OUTPATIENT
Start: 2023-08-15 | End: 2023-08-17 | Stop reason: HOSPADM

## 2023-08-15 RX ORDER — POLYETHYLENE GLYCOL 3350 17 G/17G
17 POWDER, FOR SOLUTION ORAL DAILY
Status: DISCONTINUED | OUTPATIENT
Start: 2023-08-15 | End: 2023-08-17 | Stop reason: HOSPADM

## 2023-08-15 RX ADMIN — LEVOTHYROXINE SODIUM 75 MCG: 0.07 TABLET ORAL at 07:02

## 2023-08-15 RX ADMIN — POTASSIUM & SODIUM PHOSPHATES POWDER PACK 280-160-250 MG 250 MG: 280-160-250 PACK at 21:33

## 2023-08-15 RX ADMIN — SODIUM CHLORIDE: 900 INJECTION, SOLUTION INTRAVENOUS at 16:58

## 2023-08-15 RX ADMIN — ESCITALOPRAM OXALATE 20 MG: 10 TABLET ORAL at 11:12

## 2023-08-15 RX ADMIN — SODIUM CHLORIDE, PRESERVATIVE FREE 10 ML: 5 INJECTION INTRAVENOUS at 21:37

## 2023-08-15 RX ADMIN — POTASSIUM & SODIUM PHOSPHATES POWDER PACK 280-160-250 MG 250 MG: 280-160-250 PACK at 11:11

## 2023-08-15 RX ADMIN — HEPARIN SODIUM 5000 UNITS: 5000 INJECTION INTRAVENOUS; SUBCUTANEOUS at 07:02

## 2023-08-15 RX ADMIN — AMLODIPINE BESYLATE 5 MG: 5 TABLET ORAL at 11:12

## 2023-08-15 RX ADMIN — HEPARIN SODIUM 5000 UNITS: 5000 INJECTION INTRAVENOUS; SUBCUTANEOUS at 21:34

## 2023-08-15 RX ADMIN — POTASSIUM PHOSPHATE, MONOBASIC AND POTASSIUM PHOSPHATE, DIBASIC 20 MMOL: 224; 236 INJECTION, SOLUTION, CONCENTRATE INTRAVENOUS at 17:57

## 2023-08-15 RX ADMIN — ATORVASTATIN CALCIUM 40 MG: 40 TABLET, FILM COATED ORAL at 18:01

## 2023-08-15 RX ADMIN — MEMANTINE HYDROCHLORIDE 10 MG: 10 TABLET ORAL at 21:33

## 2023-08-15 RX ADMIN — MEMANTINE HYDROCHLORIDE 10 MG: 10 TABLET ORAL at 11:12

## 2023-08-15 RX ADMIN — ASPIRIN 81 MG: 81 TABLET, COATED ORAL at 11:11

## 2023-08-15 RX ADMIN — POTASSIUM CHLORIDE 40 MEQ: 750 TABLET, FILM COATED, EXTENDED RELEASE ORAL at 15:08

## 2023-08-15 RX ADMIN — POLYETHYLENE GLYCOL 3350 17 G: 17 POWDER, FOR SOLUTION ORAL at 18:01

## 2023-08-15 RX ADMIN — DONEPEZIL HYDROCHLORIDE 5 MG: 5 TABLET, FILM COATED ORAL at 21:33

## 2023-08-15 RX ADMIN — HEPARIN SODIUM 5000 UNITS: 5000 INJECTION INTRAVENOUS; SUBCUTANEOUS at 15:08

## 2023-08-15 ASSESSMENT — PAIN SCALES - GENERAL: PAINLEVEL_OUTOF10: 0

## 2023-08-15 NOTE — PLAN OF CARE
PLAN:  Patient continues to benefit from skilled intervention to address the above impairments. Continue treatment per established plan of care. Recommendation for discharge: (in order for the patient to meet his/her long term goals): Therapy 2 days/week in the home and assist for safety    Other factors to consider for discharge: family prefer discharge home with increased support vs SNF    IF patient discharges home will need the following DME: patient owns DME required for discharge       SUBJECTIVE:   Patient stated, \"I want to get OOB. \"    OBJECTIVE DATA SUMMARY:   Critical Behavior:  Orientation  Overall Orientation Status: Impaired  Orientation Level: Oriented to person  Cognition  Overall Cognitive Status: Exceptions  Arousal/Alertness: Delayed responses to stimuli  Following Commands: Follows one step commands with increased time  Memory: Decreased recall of recent events  Safety Judgement: Decreased awareness of need for assistance;Decreased awareness of need for safety  Problem Solving: Assistance required to generate solutions;Decreased awareness of errors  Insights: Decreased awareness of deficits  Initiation: Requires cues for all  Sequencing: Requires cues for all    Functional Mobility Training:  Bed Mobility:  Bed Mobility Training  Bed Mobility Training: Yes  Overall Level of Assistance: Minimum assistance; Moderate assistance;Assist X1  Interventions: Verbal cues; Tactile cues  Supine to Sit: Minimum assistance; Moderate assistance;Assist X1  Sit to Supine:  (pt remained in bedside chair)  Scooting: Additional time;Stand-by assistance  Transfers:  Transfer Training  Transfer Training: Yes  Overall Level of Assistance: Minimum assistance;Assist X1  Interventions: Verbal cues; Tactile cues  Sit to Stand: Minimum assistance;Assist X1  Stand to Sit: Minimum assistance;Assist X1  Stand Pivot Transfers: Minimum assistance; Adaptive equipment; Additional time  Balance:  Balance  Sitting: Intact  Sitting - Static: Good (unsupported)  Sitting - Dynamic: Fair (occasional)  Standing: Impaired  Standing - Static: Fair;Constant support  Standing - Dynamic: Fair;Constant support   Ambulation/Gait Training:     Gait  Overall Level of Assistance: Contact-guard assistance;Minimum assistance; Adaptive equipment; Additional time  Interventions: Safety awareness training; Tactile cues;Manual cues  Base of Support: Center of gravity altered; Widened  Speed/Jade: Shuffled; Slow  Step Length: Right shortened;Left shortened  Gait Abnormalities: Decreased step clearance;Shuffling gait  Distance (ft): 70 Feet  Assistive Device: Gait belt;Walker, rolling    Pain Rating:  Pt denied pain. Activity Tolerance:   Up in chair > 2 hours; walking household distances    After treatment:   Patient left in no apparent distress sitting up in chair, Call bell within reach, and Bed/ chair alarm activated      COMMUNICATION/EDUCATION:   The patient's plan of care was discussed with: occupational therapist, registered nurse, and     Patient Education  Education Given To: Patient; Family  Education Provided: Transfer Training; Fall Prevention Strategies  Education Method: Demonstration;Verbal  Barriers to Learning: Cognition  Education Outcome: Continued education needed (Family/son able to verbalize understanding of fall prevention/safety in home.)      Constance Reynolds, PT  Minutes: 30

## 2023-08-15 NOTE — PROGRESS NOTES
[address] [facility]  Hospitalist Group                                                                                          Hospitalist Progress Note  Nelson FoleyISAIAH jung - LURDES  Answering service: [phone] from in house phone        Date of Service:  8/15/2023  NAME:  Andres Sevilla  :  1935  MRN:  [id number]       Admission Summary: This is an 81-year-old woman with past medical history significant for chronic kidney disease, hypothyroidism, dyslipidemia, and dementia; presented at the emergency room for evaluation following a fall at home. The fall was not witnessed. The patient was found on the floor by home caregiver and it is not clear how long the patient was on the floor before she was found by the home caregiver. The patient started complaining of right rib pain. The patient is not able to provide good history because of her dementia but the son who was present at the bedside stated that the patient is more confused than usual; and usually when the patient is confused, it is usually due to urinary tract infection. When the patient arrived at the emergency room trauma series imaging studies was performed. There was no evidence of fracture but the patient's urinalysis is consistent with urinary tract infection. The patient was started on antibiotics and was referred to the hospitalist service for admission. There was no history of fever, rigors or chills reported. The CT of the chest shows right kidney stone which was discussed with the urologist on-call and no intervention was advised. Interval history / Subjective:   Patient seen this morning, lying in bed with her son at bedside. Patient denies complaints. Awaiting PT/OT evaluation to determine dispo - HH vs. SNF. Will check echocardiogram and consult Cardiology due to elevated troponin. Patient denies chest pain or shortness of breath.      Assessment & Plan:     VIRGIL on CKD stage 3, resolved  Elevated physiological,   Ext: no clubbing, no cyanosis, no edema, brisk 2+ DP pulses  Neuro/Psych: pleasant mood and affect, CN 2-12 grossly intact, sensory grossly within normal limit  Skin: warm and dry     Data Review:    Review and/or order of clinical lab test  Review and/or order of tests in the radiology section of CPT  Review and/or order of tests in the medicine section of CPT      I have personally and independently reviewed all pertinent labs, diagnostic studies, imaging, and have provided independent interpretation of the same. Labs:     Recent Labs     08/13/23  1141 08/15/23  0857   WBC 8.0 8.6   HGB 11.6 11.2*   HCT 35.9 33.8*   * 164     Recent Labs     08/13/23  1141 08/15/23  0857    142   K 2.8* 3.2*   * 113*   CO2 25 26   BUN 16 18   MG 2.0  --    PHOS 1.9* 2.2*     Recent Labs     08/13/23  1141   ALT 23   GLOB 3.4     No results for input(s): INR, APTT in the last 72 hours. Invalid input(s): PTP   No results for input(s): TIBC, FERR in the last 72 hours. Invalid input(s): FE, PSAT   No results found for: FOL, RBCF   No results for input(s): PH, PCO2, PO2 in the last 72 hours. No results for input(s): CPK in the last 72 hours. Invalid input(s): CPKMB, CKNDX, TROIQ  Lab Results   Component Value Date/Time    CHOL 138 10/17/2022 03:15 AM    HDL 32 10/17/2022 03:15 AM     No results found for: GLUCPOC    Notes reviewed from all clinical/nonclinical/nursing services involved in patient's clinical care. Care coordination discussions were held with appropriate clinical/nonclinical/ nursing providers based on care coordination needs. Patients current active Medications were reviewed, considered, added and adjusted based on the clinical condition today. Home Medications were reconciled to the best of my ability given all available resources at the time of admission. Route is PO if not otherwise noted.     Medications Reviewed:     Current Facility-Administered

## 2023-08-16 ENCOUNTER — APPOINTMENT (OUTPATIENT)
Facility: HOSPITAL | Age: 88
End: 2023-08-16
Payer: MEDICARE

## 2023-08-16 PROBLEM — R79.89 TROPONIN LEVEL ELEVATED: Status: ACTIVE | Noted: 2023-08-16

## 2023-08-16 PROBLEM — R77.8 TROPONIN LEVEL ELEVATED: Status: ACTIVE | Noted: 2023-08-16

## 2023-08-16 PROBLEM — Z66 DNR (DO NOT RESUSCITATE): Status: ACTIVE | Noted: 2023-08-16

## 2023-08-16 PROBLEM — N30.00 ACUTE CYSTITIS WITHOUT HEMATURIA: Status: ACTIVE | Noted: 2023-08-16

## 2023-08-16 LAB
ANION GAP SERPL CALC-SCNC: 6 MMOL/L (ref 5–15)
BASOPHILS # BLD: 0 K/UL (ref 0–0.1)
BASOPHILS NFR BLD: 0 % (ref 0–1)
BUN SERPL-MCNC: 20 MG/DL (ref 6–20)
BUN/CREAT SERPL: 24 (ref 12–20)
CALCIUM SERPL-MCNC: 8.5 MG/DL (ref 8.5–10.1)
CHLORIDE SERPL-SCNC: 116 MMOL/L (ref 97–108)
CHOLEST SERPL-MCNC: 119 MG/DL
CO2 SERPL-SCNC: 23 MMOL/L (ref 21–32)
CREAT SERPL-MCNC: 0.85 MG/DL (ref 0.55–1.02)
DIFFERENTIAL METHOD BLD: ABNORMAL
ECHO AO ROOT DIAM: 3.1 CM
ECHO AO ROOT INDEX: 1.83 CM/M2
ECHO AV AREA PEAK VELOCITY: 3 CM2
ECHO AV AREA/BSA PEAK VELOCITY: 1.8 CM2/M2
ECHO AV PEAK GRADIENT: 7 MMHG
ECHO AV PEAK VELOCITY: 1.4 M/S
ECHO AV VELOCITY RATIO: 0.71
ECHO BSA: 1.71 M2
ECHO LA DIAMETER INDEX: 1.48 CM/M2
ECHO LA DIAMETER: 2.5 CM
ECHO LA TO AORTIC ROOT RATIO: 0.81
ECHO LA VOL 2C: 14 ML (ref 22–52)
ECHO LA VOL 2C: 14 ML (ref 22–52)
ECHO LA VOL 4C: 27 ML (ref 22–52)
ECHO LA VOL 4C: 29 ML (ref 22–52)
ECHO LA VOLUME AREA LENGTH: 21 ML
ECHO LA VOLUME INDEX AREA LENGTH: 12 ML/M2 (ref 16–34)
ECHO LV E' LATERAL VELOCITY: 5 CM/S
ECHO LV E' SEPTAL VELOCITY: 4 CM/S
ECHO LV EDV A2C: 36 ML
ECHO LV EDV A4C: 43 ML
ECHO LV EDV BP: 42 ML (ref 56–104)
ECHO LV EDV INDEX A4C: 25 ML/M2
ECHO LV EDV INDEX BP: 25 ML/M2
ECHO LV EDV NDEX A2C: 21 ML/M2
ECHO LV EJECTION FRACTION A2C: 53 %
ECHO LV EJECTION FRACTION A4C: 51 %
ECHO LV EJECTION FRACTION BIPLANE: 53 % (ref 55–100)
ECHO LV ESV A2C: 17 ML
ECHO LV ESV A4C: 21 ML
ECHO LV ESV BP: 20 ML (ref 19–49)
ECHO LV ESV INDEX A2C: 10 ML/M2
ECHO LV ESV INDEX A4C: 12 ML/M2
ECHO LV ESV INDEX BP: 12 ML/M2
ECHO LV FRACTIONAL SHORTENING: 25 % (ref 28–44)
ECHO LV INTERNAL DIMENSION DIASTOLE INDEX: 1.89 CM/M2
ECHO LV INTERNAL DIMENSION DIASTOLIC: 3.2 CM (ref 3.9–5.3)
ECHO LV INTERNAL DIMENSION SYSTOLIC INDEX: 1.42 CM/M2
ECHO LV INTERNAL DIMENSION SYSTOLIC: 2.4 CM
ECHO LV IVSD: 0.6 CM (ref 0.6–0.9)
ECHO LV MASS 2D: 44.2 G (ref 67–162)
ECHO LV MASS INDEX 2D: 26.2 G/M2 (ref 43–95)
ECHO LV POSTERIOR WALL DIASTOLIC: 0.6 CM (ref 0.6–0.9)
ECHO LV RELATIVE WALL THICKNESS RATIO: 0.38
ECHO LVOT AREA: 4.2 CM2
ECHO LVOT DIAM: 2.3 CM
ECHO LVOT PEAK GRADIENT: 4 MMHG
ECHO LVOT PEAK VELOCITY: 1 M/S
ECHO MV A VELOCITY: 0.97 M/S
ECHO MV AREA PHT: 2.5 CM2
ECHO MV E DECELERATION TIME (DT): 306.4 MS
ECHO MV E VELOCITY: 0.54 M/S
ECHO MV E/A RATIO: 0.56
ECHO MV E/E' LATERAL: 10.8
ECHO MV E/E' RATIO (AVERAGED): 12.15
ECHO MV E/E' SEPTAL: 13.5
ECHO MV PRESSURE HALF TIME (PHT): 88.8 MS
ECHO PV MAX VELOCITY: 1.4 M/S
ECHO PV PEAK GRADIENT: 7 MMHG
ECHO RV TAPSE: 2 CM (ref 1.7–?)
ECHO TV REGURGITANT MAX VELOCITY: 2.41 M/S
ECHO TV REGURGITANT PEAK GRADIENT: 23 MMHG
EKG ATRIAL RATE: 69 BPM
EKG DIAGNOSIS: NORMAL
EKG P AXIS: 56 DEGREES
EKG P-R INTERVAL: 148 MS
EKG Q-T INTERVAL: 500 MS
EKG QRS DURATION: 148 MS
EKG QTC CALCULATION (BAZETT): 535 MS
EKG R AXIS: -22 DEGREES
EKG T AXIS: 125 DEGREES
EKG VENTRICULAR RATE: 69 BPM
EOSINOPHIL # BLD: 0.6 K/UL (ref 0–0.4)
EOSINOPHIL NFR BLD: 8 % (ref 0–7)
ERYTHROCYTE [DISTWIDTH] IN BLOOD BY AUTOMATED COUNT: 14.1 % (ref 11.5–14.5)
GLUCOSE SERPL-MCNC: 86 MG/DL (ref 65–100)
HCT VFR BLD AUTO: 34.6 % (ref 35–47)
HDLC SERPL-MCNC: 32 MG/DL
HDLC SERPL: 3.7 (ref 0–5)
HGB BLD-MCNC: 11.2 G/DL (ref 11.5–16)
IMM GRANULOCYTES # BLD AUTO: 0 K/UL (ref 0–0.04)
IMM GRANULOCYTES NFR BLD AUTO: 0 % (ref 0–0.5)
LDLC SERPL CALC-MCNC: 61.6 MG/DL (ref 0–100)
LYMPHOCYTES # BLD: 1.8 K/UL (ref 0.8–3.5)
LYMPHOCYTES NFR BLD: 26 % (ref 12–49)
MAGNESIUM SERPL-MCNC: 2.1 MG/DL (ref 1.6–2.4)
MCH RBC QN AUTO: 33.7 PG (ref 26–34)
MCHC RBC AUTO-ENTMCNC: 32.4 G/DL (ref 30–36.5)
MCV RBC AUTO: 104.2 FL (ref 80–99)
MONOCYTES # BLD: 1 K/UL (ref 0–1)
MONOCYTES NFR BLD: 14 % (ref 5–13)
NEUTS SEG # BLD: 3.7 K/UL (ref 1.8–8)
NEUTS SEG NFR BLD: 52 % (ref 32–75)
NRBC # BLD: 0 K/UL (ref 0–0.01)
NRBC BLD-RTO: 0 PER 100 WBC
PHOSPHATE SERPL-MCNC: 3.6 MG/DL (ref 2.6–4.7)
PLATELET # BLD AUTO: 172 K/UL (ref 150–400)
PMV BLD AUTO: 11.2 FL (ref 8.9–12.9)
POTASSIUM SERPL-SCNC: 3.8 MMOL/L (ref 3.5–5.1)
RBC # BLD AUTO: 3.32 M/UL (ref 3.8–5.2)
SODIUM SERPL-SCNC: 145 MMOL/L (ref 136–145)
TRIGL SERPL-MCNC: 127 MG/DL
VLDLC SERPL CALC-MCNC: 25.4 MG/DL
WBC # BLD AUTO: 7.1 K/UL (ref 3.6–11)

## 2023-08-16 PROCEDURE — 84100 ASSAY OF PHOSPHORUS: CPT

## 2023-08-16 PROCEDURE — 2580000003 HC RX 258: Performed by: INTERNAL MEDICINE

## 2023-08-16 PROCEDURE — 99223 1ST HOSP IP/OBS HIGH 75: CPT | Performed by: INTERNAL MEDICINE

## 2023-08-16 PROCEDURE — 36415 COLL VENOUS BLD VENIPUNCTURE: CPT

## 2023-08-16 PROCEDURE — 1200000000 HC SEMI PRIVATE

## 2023-08-16 PROCEDURE — 6370000000 HC RX 637 (ALT 250 FOR IP): Performed by: INTERNAL MEDICINE

## 2023-08-16 PROCEDURE — 80061 LIPID PANEL: CPT

## 2023-08-16 PROCEDURE — 6370000000 HC RX 637 (ALT 250 FOR IP)

## 2023-08-16 PROCEDURE — 6360000002 HC RX W HCPCS: Performed by: INTERNAL MEDICINE

## 2023-08-16 PROCEDURE — 80048 BASIC METABOLIC PNL TOTAL CA: CPT

## 2023-08-16 PROCEDURE — 85025 COMPLETE CBC W/AUTO DIFF WBC: CPT

## 2023-08-16 PROCEDURE — 83735 ASSAY OF MAGNESIUM: CPT

## 2023-08-16 PROCEDURE — 97116 GAIT TRAINING THERAPY: CPT

## 2023-08-16 PROCEDURE — 93306 TTE W/DOPPLER COMPLETE: CPT

## 2023-08-16 RX ADMIN — MEMANTINE HYDROCHLORIDE 10 MG: 10 TABLET ORAL at 10:50

## 2023-08-16 RX ADMIN — ASPIRIN 81 MG: 81 TABLET, COATED ORAL at 10:50

## 2023-08-16 RX ADMIN — POTASSIUM & SODIUM PHOSPHATES POWDER PACK 280-160-250 MG 250 MG: 280-160-250 PACK at 10:49

## 2023-08-16 RX ADMIN — AMLODIPINE BESYLATE 5 MG: 5 TABLET ORAL at 10:50

## 2023-08-16 RX ADMIN — ATORVASTATIN CALCIUM 40 MG: 40 TABLET, FILM COATED ORAL at 10:50

## 2023-08-16 RX ADMIN — POLYETHYLENE GLYCOL 3350 17 G: 17 POWDER, FOR SOLUTION ORAL at 10:49

## 2023-08-16 RX ADMIN — HEPARIN SODIUM 5000 UNITS: 5000 INJECTION INTRAVENOUS; SUBCUTANEOUS at 21:35

## 2023-08-16 RX ADMIN — DONEPEZIL HYDROCHLORIDE 5 MG: 5 TABLET, FILM COATED ORAL at 21:34

## 2023-08-16 RX ADMIN — LEVOTHYROXINE SODIUM 75 MCG: 0.07 TABLET ORAL at 06:59

## 2023-08-16 RX ADMIN — MEMANTINE HYDROCHLORIDE 10 MG: 10 TABLET ORAL at 21:34

## 2023-08-16 RX ADMIN — POTASSIUM & SODIUM PHOSPHATES POWDER PACK 280-160-250 MG 250 MG: 280-160-250 PACK at 21:35

## 2023-08-16 RX ADMIN — SODIUM CHLORIDE: 900 INJECTION, SOLUTION INTRAVENOUS at 21:41

## 2023-08-16 RX ADMIN — HEPARIN SODIUM 5000 UNITS: 5000 INJECTION INTRAVENOUS; SUBCUTANEOUS at 16:33

## 2023-08-16 RX ADMIN — ESCITALOPRAM OXALATE 20 MG: 10 TABLET ORAL at 10:50

## 2023-08-16 RX ADMIN — HEPARIN SODIUM 5000 UNITS: 5000 INJECTION INTRAVENOUS; SUBCUTANEOUS at 06:59

## 2023-08-16 ASSESSMENT — PAIN SCALES - WONG BAKER
WONGBAKER_NUMERICALRESPONSE: 0

## 2023-08-16 ASSESSMENT — PAIN SCALES - GENERAL
PAINLEVEL_OUTOF10: 0

## 2023-08-16 NOTE — PROGRESS NOTES
Occupational therapy:   08/16/23      Chart reviewed and attempted to see pt, ECHO at bedside. Will defer for now and attempt back later.        Thank you,  Mahin Garay OTD, OTR/L

## 2023-08-16 NOTE — PROGRESS NOTES
[address] [facility]  Hospitalist Group                                                                                          Hospitalist Progress Note  ISAIAH Angeles NP  Answering service: [phone] from in house phone        Date of Service:  2023  NAME:  Lucho Benedict  :  1935  MRN:  [id number]       Admission Summary: This is an 69-year-old woman with past medical history significant for chronic kidney disease, hypothyroidism, dyslipidemia, and dementia; presented at the emergency room for evaluation following a fall at home. The fall was not witnessed. The patient was found on the floor by home caregiver and it is not clear how long the patient was on the floor before she was found by the home caregiver. The patient started complaining of right rib pain. The patient is not able to provide good history because of her dementia but the son who was present at the bedside stated that the patient is more confused than usual; and usually when the patient is confused, it is usually due to urinary tract infection. When the patient arrived at the emergency room trauma series imaging studies was performed. There was no evidence of fracture but the patient's urinalysis is consistent with urinary tract infection. The patient was started on antibiotics and was referred to the hospitalist service for admission. There was no history of fever, rigors or chills reported. The CT of the chest shows right kidney stone which was discussed with the urologist on-call and no intervention was advised. Interval history / Subjective:   Patient seen this morning, resting in bed with her son at bedside. Patient's family has decided to pursue SNF at time of discharge - [facility] has accepted patient and will hold bed for 24 hours. Cardiology consulted for elevated troponin - patient's son declining invasive testing for coronary artery disease.  EKG showing left bundle branch

## 2023-08-16 NOTE — PROGRESS NOTES
Physician Progress Note      PATIENT:               Antionette Almeida  CSN #:                  [id number]  :                       1935  ADMIT DATE:       2023 6:38 PM  DISCH DATE:  RESPONDING  PROVIDER #:        Caesar Mckeon          QUERY TEXT:    Pt admitted with VIRGIL. Pt noted to have history of dementia with worsening AMS   requiring high fall risk precautions and haldol. If possible, please document   in the progress notes and discharge summary if you are evaluating and / or   treating any of the following: The medical record reflects the following:  Risk Factors: VIRGLI  Clinical Indicators:   PN  Acute delirium / Hx of completed stroke / Dementia - POA, worse than baseline   due to VIRGIL and hospitalization. ? Worse for?the first?48 hours, I suspect due   to narcotic pain control, sundowning in setting of dementia. ? Neurology   consult appreciated and they concur. Now better. ? Continue donepezil, lexapro,   memantine, haldol, ASA, statin  ? Treatment: high fall risk precautions. haldol, neurology consult, memantine      Thank you,  Camden Sanchez RN, CCDS, [facility]  Certified Clinical Documentation   [phone]  you can also reach me by perfect serve. Options provided:  -- Drug-induced encephalopathy due to narcotics superimposed upon dementia  -- Metabolic encephalopathy due to VIRGIL superimposed upon dementia  -- Metabolic encephalopathy and Drug-induced encephalopathy due to narcotics   superimposed upon dementia  -- Other - I will add my own diagnosis  -- Disagree - Not applicable / Not valid  -- Disagree - Clinically unable to determine / Unknown  -- Refer to Clinical Documentation Reviewer    PROVIDER RESPONSE TEXT:    This patient has metabolic encephalopathy and Drug-induced encephalopathy due   to narcotics superimposed upon dementia.     Query created by: Kenzie Stevenson on 8/15/2023 12:57 PM      Electronically signed by:  Caesar Mckeon 2023 8:33 AM

## 2023-08-16 NOTE — PLAN OF CARE
Problem: Physical Therapy - Adult  Goal: By Discharge: Performs mobility at highest level of function for planned discharge setting. See evaluation for individualized goals. Description: FUNCTIONAL STATUS PRIOR TO ADMISSION: Patient ambulated inside the home using a RW or furniture and walls for support, relies on a cane and one hand held assist in the community, h/o falls and memory impairment. Pt's daughter in law, an occupational therapist, states pt and her spouse (also w/ memory impairment) usually manage fairly well in their home environment with the assistance available to them. HOME SUPPORT PRIOR TO ADMISSION: The patient lived with her spouse who has dementia, they have private duty caregivers 7 hrs/ day and family assisting at other times. Physical Therapy Goals  Initiated 8/10/2023  1. Patient will move from supine to sit and sit to supine in bed with supervision/set-up within 7 day(s). 2.  Patient will perform sit to stand with supervision/set-up within 7 day(s). 3.  Patient will transfer from bed to chair and chair to bed with supervision/set-up using the least restrictive device within 7 day(s). 4.  Patient will ambulate with minimal assistance for 100 feet with the least restrictive device within 7 day(s). Outcome: Progressing  PHYSICAL THERAPY TREATMENT    Patient: Ok Decker (31 y.o. female)  Date: 8/16/2023  Diagnosis: Kidney stone [N20.0]  Acute delirium [R41.0]  Acute cystitis without hematuria [N30.00]  Fall, initial encounter [W19. XXXA]  Contusion of rib on right side, initial encounter [S20.211A] VIRGIL (acute kidney injury) ([address])      Precautions: Fall Risk                    ASSESSMENT:  Patient continues to benefit from skilled PT services and is slowly progressing towards goals. As expected with right rib contusion - pt reporting most pain and requiring the most physical assist for getting in/out of bed.   Pt requiring only CGA to min assist for amb with RW - but limited to short distances secondary to poor endurance. Recommend SNF secondary to significant decline from baseline function. PLAN:  Patient continues to benefit from skilled intervention to address the above impairments. Continue treatment per established plan of care. Recommendation for discharge: (in order for the patient to meet his/her long term goals): Therapy up to 5 days/week in Skilled nursing facility        IF patient discharges home will need the following DME: patient owns DME required for discharge       SUBJECTIVE:   Patient stated, \"What should I do now. \"    OBJECTIVE DATA SUMMARY:   Functional Mobility Training:  Bed Mobility:  Bed Mobility Training  Bed Mobility Training: Yes  Overall Level of Assistance: Moderate assistance;Assist X1;Additional time; Other (comment) (HOB elevated - pt using guard rail)  Interventions: Verbal cues; Tactile cues  Supine to Sit: Moderate assistance;Assist X1;Additional time  Sit to Supine: Moderate assistance;Assist X2  Scooting: Stand-by assistance; Additional time  Transfers:  Transfer Training  Transfer Training: Yes  Overall Level of Assistance: Minimum assistance;Assist X1;Additional time  Interventions: Verbal cues; Tactile cues  Sit to Stand: Minimum assistance;Assist X1  Stand to Sit: Minimum assistance;Assist X1  Stand Pivot Transfers: Minimum assistance; Adaptive equipment; Additional time;Assist X1  Balance:  Balance  Sitting: Impaired  Sitting - Static: Good (unsupported)  Sitting - Dynamic: Fair (occasional)  Standing: Impaired  Standing - Static: Good;Constant support  Standing - Dynamic: Fair;Constant support   Ambulation/Gait Training:     Gait  Overall Level of Assistance: Contact-guard assistance;Minimum assistance; Adaptive equipment; Additional time  Interventions: Safety awareness training;Verbal cues; Tactile cues  Base of Support: Center of gravity altered; Widened  Speed/Jade: Shuffled; Slow  Step Length: Right shortened;Left shortened  Swing

## 2023-08-16 NOTE — PROGRESS NOTES
[address] [facility]  Cardiology Care Note                  []Initial visit     []Established visit     Patient Name: Vikas Brunson - FBE:[birth year] - ZPK:[id number]  Primary Cardiologist: none  Consulting Cardiologist: Juan Jose Cobb MD     Reason for initial visit: elevated troponin    Patient seen and examined by me with the above nurse practitioner. I personally performed all components of the history, physical, and medical decision making and agree with the assessment and plan with minor modifications as noted. Today the patient presents with mildly elevated troponin in the setting of UTI, dehydration, advanced age, chronic kidney disease, and hypertension. No chest pain. General PE  Gen:  NAD  Mental Status - Alert. General Appearance - Not in acute distress. HEENT:  PERRL, no carotid bruits or JVD  Chest and Lung Exam   Inspection: Accessory muscles - No use of accessory muscles in breathing. Auscultation:   Breath sounds: - Normal.   Cardiovascular   Inspection: Jugular vein - Bilateral - Inspection Normal.   Palpation/Percussion:   Apical Impulse: - Normal.   Auscultation: Rhythm - Regular. Heart Sounds - S1 WNL and S2 WNL. No S3 or S4. Murmurs & Other Heart Sounds: Auscultation of the heart reveals - No Murmurs. Peripheral Vascular   Upper Extremity: Inspection - Bilateral - No Cyanotic nailbeds or Digital clubbing. Lower Extremity:   Palpation: Edema - Bilateral - No edema. Abdomen:   Soft, non-tender, bowel sounds are active. Neuro: A&O times 3, CN and motor grossly WNL    Today the patient presents with mildly elevated troponin in the setting of UTI, dehydration, advanced age, chronic kidney disease, and hypertension. No chest pain. Minimal troponin elevation likely secondary to above multiple medical problems.   This does not represent an acute coronary syndrome or any myocardial infarction in the absence Other findings as above. Xray Result (most recent):  XR HIP BILATERAL W AP PELVIS (2 VIEWS) 08/09/2023    Narrative  EXAM: XR HIP BILATERAL W AP PELVIS (2 VIEWS)    INDICATION: fall. COMPARISON: None. FINDINGS: 3 views bilateral hips. AP view of the pelvis and frogleg lateral  views of both hips demonstrate no fracture, dislocation or other acute  abnormality. Left total hip arthroplasty hardware is intact.     Impression  No acute abnormality        Recent Labs     08/15/23  0857 08/16/23  0412    145   K 3.2* 3.8   * 116*   CO2 26 23   BUN 18 20   MG  --  2.1   PHOS 2.2* 3.6     Recent Labs     08/15/23  0857 08/16/23  0412   WBC 8.6 7.1   HGB 11.2* 11.2*   HCT 33.8* 34.6*    172     Creatinine (MG/DL)   Date Value   08/16/2023 0.85   08/15/2023 0.94   08/13/2023 1.01   08/11/2023 1.21 (H)   08/10/2023 1.49 (H)       Current meds:    Current Facility-Administered Medications:     atorvastatin (LIPITOR) tablet 40 mg, 40 mg, Oral, Daily, ISAIAH Alicea NP, 40 mg at 08/16/23 1050    polyethylene glycol (GLYCOLAX) packet 17 g, 17 g, Oral, Daily, ISAIAH Alicea - NP, 17 g at 08/16/23 1049    amLODIPine (NORVASC) tablet 5 mg, 5 mg, Oral, Daily, Jami Palmer MD, 5 mg at 08/16/23 1050    potassium & sodium phosphates (PHOS-NAK) 280-160-250 MG packet 250 mg, 1 packet, Oral, BID, Jami Palmer MD, 250 mg at 08/16/23 1049    acetaminophen (TYLENOL) tablet 650 mg, 650 mg, Oral, Q4H PRN, Jami Palmer MD, 650 mg at 08/13/23 1339    lidocaine 4 % external patch 1 patch, 1 patch, TransDERmal, Daily, Jami Palmer MD, 1 patch at 08/16/23 1049    oxyCODONE (ROXICODONE) immediate release tablet 2.5 mg, 2.5 mg, Oral, Q4H PRN, Jami Palmer MD    aspirin EC tablet 81 mg, 81 mg, Oral, Daily, Lyndsay Alfredo MD, 81 mg at 08/16/23 1050    donepezil (ARICEPT) tablet 5 mg, 5 mg, Oral, Nightly, Lyndsay Alfredo MD, 5 mg at 08/15/23 4563    escitalopram (LEXAPRO) tablet 20 mg, 20

## 2023-08-16 NOTE — CARE COORDINATION
Transition of Care Plan:    RUR: 15% Medium   Disposition: SNF; [facility] accepted   If SNF or IPR: Date FOC offered: 8/16/23  Date [address] received: 8/16/23  [facility] facility[facility]   Follow up appointments:   DME needed: SNF to provide DME needs  Transportation at discharge: BLS transport set-up needed  IM/IMM Medicare/ letter given: 1st IM: 8/10 & 2nd IM: 8/12 (DC postponed after 2nd IM given) - will need new 2nd IM letter   Is patient a  and connected with [address] [place] [address]? NA  Caregiver Contact: Sully Medley, [phone] or son, Renée Hunter, [phone]  Discharge Caregiver contacted prior to discharge? Yes  Care Conference needed? No  Barriers to discharge: Medical; echo and cardiology consult     10:30AM - Per ID rounds, awaiting echo and cardiologist consult. CM spoke with susanne, Delwyn Closs and Renée Hunter in person who expressed they would like for patient to DC to SNF. SNF discussions had been previously held; however, until now family had wanted [facility] services. PT/OT recommending SNF as well. Referrals sent to [facility], [address] [facility] and [facility] via [place] and [facility] [address]. [facility]. CM provided update to patient's son, Khadijah Hernandez at bedside and in agreement with moving forward with [facility]. JOSAFAT spoke with [facility], [facility] (p [phone]), who stated she will hold bed for patient to admit within 24 hours. Susanne made aware and remain in agreement. Anticipate DC tomorrow, Thursday, 8/17 pending echo results/cardio consult. CM will continue to follow as needed.      RONEN Cannon   [phone]

## 2023-08-17 VITALS
SYSTOLIC BLOOD PRESSURE: 172 MMHG | BODY MASS INDEX: 24.46 KG/M2 | WEIGHT: 143.3 LBS | HEART RATE: 69 BPM | DIASTOLIC BLOOD PRESSURE: 67 MMHG | OXYGEN SATURATION: 96 % | RESPIRATION RATE: 18 BRPM | TEMPERATURE: 98.2 F | HEIGHT: 64 IN

## 2023-08-17 LAB
ANION GAP SERPL CALC-SCNC: 6 MMOL/L (ref 5–15)
BASOPHILS # BLD: 0.1 K/UL (ref 0–0.1)
BASOPHILS NFR BLD: 1 % (ref 0–1)
BUN SERPL-MCNC: 17 MG/DL (ref 6–20)
BUN/CREAT SERPL: 19 (ref 12–20)
CALCIUM SERPL-MCNC: 9.1 MG/DL (ref 8.5–10.1)
CHLORIDE SERPL-SCNC: 112 MMOL/L (ref 97–108)
CO2 SERPL-SCNC: 25 MMOL/L (ref 21–32)
CREAT SERPL-MCNC: 0.88 MG/DL (ref 0.55–1.02)
DIFFERENTIAL METHOD BLD: ABNORMAL
EOSINOPHIL # BLD: 0.6 K/UL (ref 0–0.4)
EOSINOPHIL NFR BLD: 8 % (ref 0–7)
ERYTHROCYTE [DISTWIDTH] IN BLOOD BY AUTOMATED COUNT: 14.4 % (ref 11.5–14.5)
GLUCOSE SERPL-MCNC: 92 MG/DL (ref 65–100)
HCT VFR BLD AUTO: 35 % (ref 35–47)
HGB BLD-MCNC: 11.6 G/DL (ref 11.5–16)
IMM GRANULOCYTES # BLD AUTO: 0 K/UL (ref 0–0.04)
IMM GRANULOCYTES NFR BLD AUTO: 0 % (ref 0–0.5)
LYMPHOCYTES # BLD: 1.8 K/UL (ref 0.8–3.5)
LYMPHOCYTES NFR BLD: 23 % (ref 12–49)
MCH RBC QN AUTO: 33.9 PG (ref 26–34)
MCHC RBC AUTO-ENTMCNC: 33.1 G/DL (ref 30–36.5)
MCV RBC AUTO: 102.3 FL (ref 80–99)
MONOCYTES # BLD: 1 K/UL (ref 0–1)
MONOCYTES NFR BLD: 13 % (ref 5–13)
NEUTS SEG # BLD: 4.3 K/UL (ref 1.8–8)
NEUTS SEG NFR BLD: 55 % (ref 32–75)
NRBC # BLD: 0 K/UL (ref 0–0.01)
NRBC BLD-RTO: 0 PER 100 WBC
PHOSPHATE SERPL-MCNC: 3.3 MG/DL (ref 2.6–4.7)
PLATELET # BLD AUTO: 185 K/UL (ref 150–400)
PMV BLD AUTO: 11.2 FL (ref 8.9–12.9)
POTASSIUM SERPL-SCNC: 3.6 MMOL/L (ref 3.5–5.1)
RBC # BLD AUTO: 3.42 M/UL (ref 3.8–5.2)
SODIUM SERPL-SCNC: 143 MMOL/L (ref 136–145)
WBC # BLD AUTO: 7.8 K/UL (ref 3.6–11)

## 2023-08-17 PROCEDURE — 6360000002 HC RX W HCPCS: Performed by: INTERNAL MEDICINE

## 2023-08-17 PROCEDURE — 80048 BASIC METABOLIC PNL TOTAL CA: CPT

## 2023-08-17 PROCEDURE — 6370000000 HC RX 637 (ALT 250 FOR IP): Performed by: INTERNAL MEDICINE

## 2023-08-17 PROCEDURE — 84100 ASSAY OF PHOSPHORUS: CPT

## 2023-08-17 PROCEDURE — 6370000000 HC RX 637 (ALT 250 FOR IP)

## 2023-08-17 PROCEDURE — 36415 COLL VENOUS BLD VENIPUNCTURE: CPT

## 2023-08-17 PROCEDURE — 85025 COMPLETE CBC W/AUTO DIFF WBC: CPT

## 2023-08-17 RX ORDER — AMLODIPINE BESYLATE 5 MG/1
5 TABLET ORAL ONCE
Status: COMPLETED | OUTPATIENT
Start: 2023-08-17 | End: 2023-08-17

## 2023-08-17 RX ORDER — LIDOCAINE 50 MG/G
1 PATCH TOPICAL DAILY
Qty: 5 PATCH | Refills: 0 | Status: SHIPPED
Start: 2023-08-17 | End: 2023-08-22

## 2023-08-17 RX ORDER — AMLODIPINE BESYLATE 5 MG/1
10 TABLET ORAL DAILY
Status: DISCONTINUED | OUTPATIENT
Start: 2023-08-18 | End: 2023-08-17 | Stop reason: HOSPADM

## 2023-08-17 RX ORDER — ATORVASTATIN CALCIUM 40 MG/1
40 TABLET, FILM COATED ORAL DAILY
Qty: 30 TABLET | Refills: 0 | Status: SHIPPED
Start: 2023-08-18

## 2023-08-17 RX ORDER — AMLODIPINE BESYLATE 10 MG/1
10 TABLET ORAL DAILY
Qty: 30 TABLET | Refills: 0 | Status: SHIPPED
Start: 2023-08-18

## 2023-08-17 RX ADMIN — AMLODIPINE BESYLATE 5 MG: 5 TABLET ORAL at 08:25

## 2023-08-17 RX ADMIN — LEVOTHYROXINE SODIUM 75 MCG: 0.07 TABLET ORAL at 07:04

## 2023-08-17 RX ADMIN — HEPARIN SODIUM 5000 UNITS: 5000 INJECTION INTRAVENOUS; SUBCUTANEOUS at 07:04

## 2023-08-17 RX ADMIN — AMLODIPINE BESYLATE 5 MG: 5 TABLET ORAL at 10:21

## 2023-08-17 RX ADMIN — ESCITALOPRAM OXALATE 20 MG: 10 TABLET ORAL at 08:25

## 2023-08-17 RX ADMIN — POLYETHYLENE GLYCOL 3350 17 G: 17 POWDER, FOR SOLUTION ORAL at 08:25

## 2023-08-17 RX ADMIN — POTASSIUM & SODIUM PHOSPHATES POWDER PACK 280-160-250 MG 250 MG: 280-160-250 PACK at 08:25

## 2023-08-17 RX ADMIN — ASPIRIN 81 MG: 81 TABLET, COATED ORAL at 08:25

## 2023-08-17 RX ADMIN — ATORVASTATIN CALCIUM 40 MG: 40 TABLET, FILM COATED ORAL at 08:25

## 2023-08-17 RX ADMIN — MEMANTINE HYDROCHLORIDE 10 MG: 10 TABLET ORAL at 08:25

## 2023-08-17 NOTE — PROGRESS NOTES
Gave report to [facility]- [facility]ATRY [facility] for transfer of care to [facility] and provided all pertinent information.

## 2023-08-17 NOTE — CARE COORDINATION
Transition of Care Plan:     RUR: 14% Low  Disposition: SNF; [facility]   If SNF or IPR: Date FOC offered: 8/16/23  Date FOC received: 8/16/23  Accepting facility: [facility]   Follow up appointments: PCP, specialist   DME needed: SNF to provide DME needs  Transportation at discharge: [facility] stretcher transport Thurs. 8/17 @ 11AM.   IM/IMM Medicare/ letter given: 1st IM: 8/10 & 2nd IM: 8/17  Is patient a [place] and connected with [facility]? NA  Caregiver Contact: Sonal Gilmann, [phone] or son, Elena Valencia, [phone]  Discharge Caregiver contacted prior to discharge? Yes  Care Conference needed? No  Barriers to discharge: None    9:30AM - Patient medically stable for DC. CM spoke with General acute hospital, [facility] (p: [phone]) who confirmed bed is available. Patient will admit to room #116. Nursing to call report to [phone], ask for Unit 1. [facility] transport scheduled for today, Thursday, 8/17 @ 11AM. CM faxed (f: [phone]) DC summary & DNR form to General acute hospital. Discharge packet and ambulance form placed on patient's hard chart. Son and patient aware. No further CM needs. Transition of Care Plan to SNF/Rehab    Communication to Patient/Family:  Met with patient and family and they are agreeable to the transition plan. The Plan for Transition of Care is related to the following treatment goals: SNF - [facility]    The Patient and/or patient representative was provided with a choice of provider and agrees  with the discharge plan. Yes [x] No []    A Freedom of choice list was provided with basic dialogue that supports the patient's individualized plan of care/goals and shares the quality data associated with the providers. Yes [x] No []    SNF/Rehab Transition:  Patient has been accepted to [facility] SNF/Rehab and meets criteria for admission.    Patient will transported by [facility] and expected to leave at 11AM.    Communication to SNF/Rehab:  Bedside RN, Stefano Richards, has been notified to

## 2023-08-17 NOTE — DISCHARGE SUMMARY
Discharge Summary       PATIENT ID: Idalia Graham  MRN: [id number]   YOB: 1935    DATE OF ADMISSION: 8/9/2023  6:38 PM    DATE OF DISCHARGE: 8/17/2023   PRIMARY CARE PROVIDER: Tanvi Butcher MD     ATTENDING PHYSICIAN: Dr. Jaiden Pickens  DISCHARGING PROVIDER: ISAIAH Hughes NP    To contact this individual call [phone] and ask the  to page. If unavailable ask to be transferred the Adult Hospitalist Department. CONSULTATIONS: IP CONSULT TO UROLOGY  IP CONSULT TO CASE MANAGEMENT  IP CONSULT TO CASE MANAGEMENT  IP CONSULT TO CASE MANAGEMENT  IP CONSULT TO NEUROLOGY  IP CONSULT TO CASE MANAGEMENT  IP CONSULT TO OCCUPATIONAL THERAPY  IP CONSULT TO CARDIOLOGY    PROCEDURES/SURGERIES: * No surgery found *     [address] COURSE:   This is an 52-year-old woman with past medical history significant for chronic kidney disease, hypothyroidism, dyslipidemia, and dementia; presented at the emergency room for evaluation following a fall at home. The fall was not witnessed. The patient was found on the floor by home caregiver and it is not clear how long the patient was on the floor before she was found by the home caregiver. The patient started complaining of right rib pain. The patient is not able to provide good history because of her dementia but the son who was present at the bedside stated that the patient is more confused than usual; and usually when the patient is confused, it is usually due to urinary tract infection. When the patient arrived at the emergency room trauma series imaging studies was performed. There was no evidence of fracture but the patient's urinalysis is consistent with urinary tract infection. The patient was started on antibiotics and was referred to the hospitalist service for admission. There was no history of fever, rigors or chills reported.   The CT of the chest shows right kidney stone which was discussed with the urologist on-call and known as: [facility]               Where to Get Your Medications        Information about where to get these medications is not yet available    Ask your nurse or doctor about these medications  amLODIPine 10 MG tablet  atorvastatin 40 MG tablet  lidocaine 5 %  potassium & sodium phosphates 280-160-250 MG Pack           NOTIFY YOUR PHYSICIAN FOR ANY OF THE FOLLOWING:   Fever over 101 degrees for 24 hours. Chest pain, shortness of breath, fever, chills, nausea, vomiting, diarrhea, change in mentation, falling, weakness, bleeding. Severe pain or pain not relieved by medications. Or, any other signs or symptoms that you may have questions about. DISPOSITION:    Home With:   OT  PT  HH  RN      X Long term SNF/Inpatient Rehab    Independent/assisted living    Hospice    Other:       PATIENT CONDITION AT DISCHARGE:     Functional status    Poor    X Deconditioned     Independent      Cognition     Lucid     Forgetful    X Dementia      Catheters/lines (plus indication)    [place]     PICC     PEG    X None      Code status     Full code    X DNR      PHYSICAL EXAMINATION AT DISCHARGE:  Patient seen this morning, reports no pain, nausea, or vomiting.      General : alert, awake, no acute distress, pleasantly confused  HEENT: PEERL, EOMI, moist mucus membrane  Neck: supple, no JVD, no meningeal signs  Chest: Clear to auscultation bilaterally   CVS: S1 S2 heard, Capillary refill less than 2 seconds  Abd: soft/non tender, non distended, BS physiological  Ext: no clubbing, no cyanosis, no edema, brisk 2+ DP pulses  Neuro/Psych: pleasant mood and affect, CN 2-12 grossly intact, sensory grossly within normal limit  Skin: warm and dry, fragile    CHRONIC MEDICAL DIAGNOSES:  Principal Problem:    VIRGIL (acute kidney injury) ([address])  Active Problems:    Hypothyroidism    Ambulatory dysfunction    Acute delirium    Hypertension    CKD (chronic kidney disease) stage 3, GFR 30-59 ml/min ([facility])    Nephrolithiasis    Hiatal hernia

## 2023-08-23 ENCOUNTER — HOSPITAL ENCOUNTER (EMERGENCY)
Facility: HOSPITAL | Age: 88
Discharge: HOME | End: 2023-08-23
Attending: STUDENT IN AN ORGANIZED HEALTH CARE EDUCATION/TRAINING PROGRAM
Payer: MEDICARE

## 2023-08-23 ENCOUNTER — APPOINTMENT (OUTPATIENT)
Facility: HOSPITAL | Age: 88
End: 2023-08-23
Payer: MEDICARE

## 2023-08-23 VITALS
WEIGHT: 150.57 LBS | SYSTOLIC BLOOD PRESSURE: 118 MMHG | DIASTOLIC BLOOD PRESSURE: 43 MMHG | TEMPERATURE: 98.2 F | RESPIRATION RATE: 16 BRPM | BODY MASS INDEX: 26.02 KG/M2 | OXYGEN SATURATION: 95 % | HEART RATE: 68 BPM

## 2023-08-23 DIAGNOSIS — S00.83XA CONTUSION OF OTHER PART OF HEAD, INITIAL ENCOUNTER: Primary | ICD-10-CM

## 2023-08-23 PROCEDURE — 70450 CT HEAD/BRAIN W/O DYE: CPT

## 2023-08-23 PROCEDURE — 99284 EMERGENCY DEPT VISIT MOD MDM: CPT

## 2023-08-23 ASSESSMENT — ENCOUNTER SYMPTOMS: COLOR CHANGE: 1

## 2023-08-23 ASSESSMENT — PAIN - FUNCTIONAL ASSESSMENT: PAIN_FUNCTIONAL_ASSESSMENT: NONE - DENIES PAIN

## 2023-08-23 NOTE — ED NOTES
TRANSFER - OUT REPORT:    Verbal report given to RIKI Paulson on Nilda Molina  being transferred to [facility] for routine progression of patient care       Report consisted of patient's Situation, Background, Assessment and   Recommendations(SBAR). Information from the following report(s) ED Encounter Summary was reviewed with the receiving nurse. Opportunity for questions and clarification was provided. Copy of discharge instructions sent with patient back to facility.            John Arreola RN  08/23/23 7474

## 2023-08-23 NOTE — ED NOTES
Skin tear cleansed with normal saline. Mepilex applied. Patient tolerated well.       Rios Marmolejo RN  08/23/23 [address], RN  08/23/23 6414

## 2023-08-23 NOTE — ED TRIAGE NOTES
81year old comes in via squad from [facility] for a CC Fall with head injury and abrasion. EMS state that patient normally doesn't walk around and they wound her on the floor. Pt is A&Ox1 at baseline with dementia and stroke.

## 2023-08-23 NOTE — ED NOTES
Patient transported to [facility] via [facility].      Chiquis Benítez RN  08/23/23 7515

## 2023-08-30 ENCOUNTER — HOME HEALTH ADMISSION (OUTPATIENT)
Dept: HOME HEALTH SERVICES | Facility: HOME HEALTH | Age: 88
End: 2023-08-30
Payer: MEDICARE

## 2023-09-03 ENCOUNTER — HOME CARE VISIT (OUTPATIENT)
Facility: HOME HEALTH | Age: 88
End: 2023-09-03

## 2023-09-03 VITALS
WEIGHT: 145 LBS | DIASTOLIC BLOOD PRESSURE: 72 MMHG | TEMPERATURE: 97.2 F | SYSTOLIC BLOOD PRESSURE: 122 MMHG | BODY MASS INDEX: 25.06 KG/M2 | OXYGEN SATURATION: 96 % | HEART RATE: 63 BPM | RESPIRATION RATE: 18 BRPM

## 2023-09-03 PROCEDURE — G0299 HHS/HOSPICE OF RN EA 15 MIN: HCPCS

## 2023-09-03 PROCEDURE — 0221000100 HH NO PAY CLAIM PROCEDURE

## 2023-09-03 ASSESSMENT — ENCOUNTER SYMPTOMS
STOOL DESCRIPTION: FORMED
DYSPNEA ACTIVITY LEVEL: AFTER AMBULATING MORE THAN 20 FT

## 2023-09-05 ENCOUNTER — HOME CARE VISIT (OUTPATIENT)
Facility: HOME HEALTH | Age: 88
End: 2023-09-05

## 2023-09-05 ENCOUNTER — HOME CARE VISIT (OUTPATIENT)
Dept: HOME HEALTH SERVICES | Facility: HOME HEALTH | Age: 88
End: 2023-09-05

## 2023-09-05 VITALS
HEART RATE: 60 BPM | OXYGEN SATURATION: 96 % | DIASTOLIC BLOOD PRESSURE: 62 MMHG | SYSTOLIC BLOOD PRESSURE: 112 MMHG | RESPIRATION RATE: 16 BRPM | TEMPERATURE: 98 F

## 2023-09-05 PROCEDURE — G0151 HHCP-SERV OF PT,EA 15 MIN: HCPCS

## 2023-09-05 ASSESSMENT — ENCOUNTER SYMPTOMS: DYSPNEA ACTIVITY LEVEL: AFTER AMBULATING LESS THAN 10 FT

## 2023-09-05 NOTE — HOME HEALTH
Pt is a pleasant 81 y.o. woman with dementia. She lives with her , who is unable to provide caregiver assistance, in a 1 story home with 3 steps down, with B railings, to the living room. Pt's home has 3 steps and 9 steps to enter with railing. Pt has paid caregivers 4 hours in the morning and 3 hours in the evening to assist with ADLs, iADLs. Pt's son, Diana Canela, lives behind pt and checks in with his parents daily. Pt's daughter-in-law is an OT. Pt was found by her paid caregiver at home after a fall secondary to UTI on 8/9/2023. She then was admitted to the hospital due to VIRGIL (acute kidney injury) / CKD (chronic kidney disease) stage 3 and Dehydration. Pt was discharged from the hospital on 8/17 and admitted to rehab at [facility]. Pt had another fall while at [facility] on 8/23. She was discharged from rehab to home on 8/29. Pt is homebound due to dementia, BLE weakness, SOB with min exertion, poor endurance, poor balance and gait abnormalities. Pt is at risk for falls and requires an AD and supervision at all times for safety. It is extremely difficult for this pt to leave her home without max effort and assistance. Pt would benefit from PT treatments 1d1, 1w1, 2w5 and 3 PRN to improve BLE strength, endurance, gait and balance to improve her function and safety and prevent falls. PMH includes: Metabolic encephalopathy, Stroke, Hypothyroidism, Hyperlipidemia, Expressive aphasia, UTI (urinary tract infection), Altered mental status, COVID-19, Ambulatory dysfunction, Acute delirium, Hypertension, CKD (chronic kidney disease) stage 3, Nephrolithiasis, Hiatal hernia, Dilated renal pelvis, VIRGIL (acute kidney injury), Dehydration, Debilitated patient, Pyuria, Acute cystitis without hematuria, Troponin level elevated, falls     Subjective: Pt's son reports pt came home on Friday from [facility]. Pt reports \"ahh, it's good\" indicating she is happy to be home, pt has expressive aphasia.   Falls since last

## 2023-09-07 ENCOUNTER — HOME CARE VISIT (OUTPATIENT)
Facility: HOME HEALTH | Age: 88
End: 2023-09-07

## 2023-09-07 VITALS
DIASTOLIC BLOOD PRESSURE: 68 MMHG | SYSTOLIC BLOOD PRESSURE: 124 MMHG | RESPIRATION RATE: 16 BRPM | HEART RATE: 65 BPM | TEMPERATURE: 98.6 F | OXYGEN SATURATION: 96 %

## 2023-09-07 PROCEDURE — G0151 HHCP-SERV OF PT,EA 15 MIN: HCPCS

## 2023-09-07 NOTE — HOME HEALTH
Subjective: Pt's son and caregiver report pt is inconsistently using her RW when she has no caregiver in the home. PT re-instructed caregivers that it is safest for pt to walk with her RW and supervision at all time however pt does not have 24 hour caregivers to monitor and ensure her safety. PT also instructed caregivers to leave RW right in front of pt to help her to remember to use RW when they are not there however pt has been moving the RW out of her way and then walking in her environment. Caregivers state they will ensure her safety and use of RW when they are in the home. Falls since last visit: no falls since returning home   Caregiver involvement changes: paid caregiver, Casey Workman, is present and son, Justin Rangel, lives behind pt and checks on her daily. Justin Rangel is also present during this PT treatment  Home health supplies by type and quantity ordered/delivered this visit include: none   Clinician asked if patient has had any physician contact since last home care visit and patient states: no   Clinician asked if patient has any new or changed medications and patient states: no   If Yes, were medications reconciled? n/a  Was the certifying physician notified of changes in medications? n/a   Clinical assessment (what this visit means for the patient overall and need for ongoing skilled care) and progress or lack of progress towards SPECIFIC goals: Pt's son understands antiplatelet and antipsychotic medication education and these goals have been achieved. Pt's son understands cognitive impairment, dementia education and this goal has been achieved.  Pt was able to perform additional ther ex today however she cont to require cues for proper technique, making slow but steady progres toward her strength goal. Pt was able to transfer for more reps however she cont to require assistance for proper technique, making slow but steady progress toward her transfer goal. Pt was able to amb farther during ther ex however she cont

## 2023-09-09 PROBLEM — E86.0 DEHYDRATION: Status: RESOLVED | Noted: 2023-08-10 | Resolved: 2023-09-09

## 2023-09-11 ENCOUNTER — HOME CARE VISIT (OUTPATIENT)
Facility: HOME HEALTH | Age: 88
End: 2023-09-11

## 2023-09-11 VITALS
HEART RATE: 73 BPM | DIASTOLIC BLOOD PRESSURE: 62 MMHG | SYSTOLIC BLOOD PRESSURE: 124 MMHG | OXYGEN SATURATION: 97 % | RESPIRATION RATE: 16 BRPM | TEMPERATURE: 96.8 F

## 2023-09-11 PROCEDURE — G0300 HHS/HOSPICE OF LPN EA 15 MIN: HCPCS

## 2023-09-11 PROCEDURE — G0151 HHCP-SERV OF PT,EA 15 MIN: HCPCS

## 2023-09-11 NOTE — HOME HEALTH
Subjective: Pt's caregiver, Magy Ellison, reports she had to wake pt this morning, prior to her PT appointment. ROQUE, nurse, present upon PT arrival. Pt has expressive aphasia but indicates she is tired today. Pt's daughter-in-law, Nathalie Jesus, is going to take pt to MD appointment later today. Falls since last visit: no falls since returning home   Caregiver involvement changes: paid caregiver, Magy Ellison, is present and son, Augusta Yip, lives behind pt and checks on her daily. Home health supplies by type and quantity ordered/delivered this visit include: none   Clinician asked if patient has had any physician contact since last home care visit and patient states: no   Clinician asked if patient has any new or changed medications and patient states: no   If Yes, were medications reconciled? n/a   Was the certifying physician notified of changes in medications? n/a   Clinical assessment (what this visit means for the patient overall and need for ongoing skilled care) and progress or lack of progress towards SPECIFIC goals: Pt was able to perform additional ther ex today however she cont to require cues for proper technique, making slow but steady progres toward her strength goal. Pt was able to transfer with improved technique after cues however she cont to require assistance, making slow but steady progress toward her transfer goal. Pt was able to amb with RW and SPC however she cont to have LOB amb with SPC and have gait abnormalities indicating she is at risk for falls, making slow but steady progress toward her gait goal. Pt was fatigued after her treatment today, indicating she cont to have poor endurance and be at risk for falls. Pt cont to benefit from PT treatments to improve BLE strength, endurance, gait and balance to improve her function and safety and prevent falls.    Written Teaching Material Utilized: home health handbook with phone numbers, fall prevention strategies, HEP   Interdisciplinary communication with: ROQUE

## 2023-09-12 VITALS
TEMPERATURE: 96.8 F | OXYGEN SATURATION: 97 % | RESPIRATION RATE: 20 BRPM | DIASTOLIC BLOOD PRESSURE: 62 MMHG | SYSTOLIC BLOOD PRESSURE: 124 MMHG | HEART RATE: 73 BPM

## 2023-09-12 NOTE — HOME HEALTH
Subjective: Hi  Falls since last visit No(if yes complete the Fall Tracking Form and include bsrifallreport):   Caregiver involvement changes: no  Home health supplies by type and quantity ordered/delivered this visit include: no    Clinician asked if patient has had any physician contact since last home care visit and patient states: NO  Clinician asked if patient has any new or changed medications and patient states:  NO   If Yes, were medications reconciled? NO   Was the certifying physician notified of changes in medications? NO     Clinical assessment (what this visit means for the patient overall and need for ongoing skilled care) and progress or lack of progress towards SPECIFIC goals: pt with multiple comorbidities in need of nursing to assess/monitor/teach to assist in keeping pt from multiple hospital admissions. Co-visit with PT    Written Teaching Material Utilized: N/A    Interdisciplinary communication with:  PT for the purpose of co-managed today    Discharge planning as follows:  Will discharge when the patient has reached their maximum functional potential and maximum safety in their home    Specific plan for next visit: cognitive/physical assess monitor/teach

## 2023-09-13 ENCOUNTER — HOME CARE VISIT (OUTPATIENT)
Facility: HOME HEALTH | Age: 88
End: 2023-09-13

## 2023-09-13 VITALS
HEART RATE: 63 BPM | OXYGEN SATURATION: 97 % | DIASTOLIC BLOOD PRESSURE: 64 MMHG | RESPIRATION RATE: 16 BRPM | SYSTOLIC BLOOD PRESSURE: 122 MMHG | TEMPERATURE: 97.9 F

## 2023-09-13 VITALS
TEMPERATURE: 97.9 F | DIASTOLIC BLOOD PRESSURE: 62 MMHG | SYSTOLIC BLOOD PRESSURE: 124 MMHG | HEART RATE: 65 BPM | RESPIRATION RATE: 16 BRPM | OXYGEN SATURATION: 93 %

## 2023-09-13 PROCEDURE — G0300 HHS/HOSPICE OF LPN EA 15 MIN: HCPCS

## 2023-09-13 PROCEDURE — G0151 HHCP-SERV OF PT,EA 15 MIN: HCPCS

## 2023-09-13 NOTE — HOME HEALTH
Subjective: Caregiver:  Sharita Orona goes to the bathroom on her own. \"  Falls since last visit No(if yes complete the Fall Tracking Form and include bsrifallreport):   Caregiver involvement changes: No  Home health supplies by type and quantity ordered/delivered this visit include: n/a    Clinician asked if patient has had any physician contact since last home care visit and patient states: NO  Clinician asked if patient has any new or changed medications and patient states:  NO   If Yes, were medications reconciled? N/A   Was the certifying physician notified of changes in medications? N/A     Clinical assessment (what this visit means for the patient overall and need for ongoing skilled care) and progress or lack of progress towards SPECIFIC goals: Pt at risk for rehospitalization r/t fall risk. Written Teaching Material Utilized: N/A    Interdisciplinary communication with: N/A for the purpose of n/a    Discharge planning as follows:  When goals are met    Specific plan for next visit: Assessment, education as needed Detail Level: Zone Plan: Pt is scheduled for mohs with Dr. Alcala at the end of this month

## 2023-09-13 NOTE — HOME HEALTH
Subjective: Pt's caregiver, Celester Reasons, reports pt had more confusion today. PT notified Monica Nunez, pt's daughter-in-law, that pt had a good PT treatment today however caregiver states that pt had more confusion upon waking this morning. Monica Nunez also reports she has supplies to test pt for a UTI and she will check pt's urine later today. Pt has a history of multiple UTIs, PT asked Monica Nunez if MD had considered putting pt on a preventative antibiotic however Monica Carrillovazquez states she \"didn't agree\" and \"it's thought that part of the probelm now is the kidney stone\". Falls since last visit: no falls since returning home   Caregiver involvement changes: paid caregiver, Shraddha Hannah, is present and son, Som Krishnan, lives behind pt and checks on her daily.    Home health supplies by type and quantity ordered/delivered this visit include: none   Clinician asked if patient has had any physician contact since last home care visit and patient states: no   Clinician asked if patient has any new or changed medications and patient states: no   If Yes, were medications reconciled? n/a   Was the certifying physician notified of changes in medications? n/a   Clinical assessment (what this visit means for the patient overall and need for ongoing skilled care) and progress or lack of progress towards SPECIFIC goals: Pt was able to perform additional ther ex today however she cont to require cues for proper technique, making slow but steady progres toward her strength goal. Pt was able to transfer with improved technique after cues however she cont to require assistance for safety and proper technique, making slow but steady progress toward her transfer goal. Pt was able to perform her balance ex however she cont to present with balance deficits and today, she did not want to move her head with balance ex, indicating she cont to have balance deficits and be at risk for falls, making slow but steady progress toward her balance goal. Pt was able to amb with RW and

## 2023-09-18 ENCOUNTER — HOME CARE VISIT (OUTPATIENT)
Facility: HOME HEALTH | Age: 88
End: 2023-09-18
Payer: MEDICARE

## 2023-09-18 VITALS
TEMPERATURE: 98.6 F | HEART RATE: 93 BPM | RESPIRATION RATE: 16 BRPM | SYSTOLIC BLOOD PRESSURE: 116 MMHG | OXYGEN SATURATION: 94 % | DIASTOLIC BLOOD PRESSURE: 64 MMHG

## 2023-09-18 PROCEDURE — G0151 HHCP-SERV OF PT,EA 15 MIN: HCPCS

## 2023-09-18 NOTE — HOME HEALTH
Subjective: Pt's caregiver, Anuradha Almaraz, reports pt is \"a little tired\". Pt is eating breakfast upon PT arrival. Pt reports \"fine\" when asked how she is doing today. Falls since last visit: no falls since returning home   Caregiver involvement changes: paid caregiver, Anuradha Almaraz, is present and son, Nancy Gomez, lives behind pt and checks on her daily. Home health supplies by type and quantity ordered/delivered this visit include: none   Clinician asked if patient has had any physician contact since last home care visit and patient states: no   Clinician asked if patient has any new or changed medications and patient states: no   If Yes, were medications reconciled? n/a   Was the certifying physician notified of changes in medications? n/a   Clinical assessment (what this visit means for the patient overall and need for ongoing skilled care) and progress or lack of progress towards SPECIFIC goals: Pt was able to perform all ther ex today however she cont to require cues for proper technique, making slow but steady progres toward her strength goal. Pt was able to transfer with improved technique after cues however she cont to require assistance, making slow but steady progress toward her transfer goal. Pt was able to perform her balance ex however she cont to present with balance deficits, indicating she cont to be at risk for falls, making slow but steady progress toward her balance goal. Pt was able to amb with RW and SPC inside however she cont to have LOB amb with SPC and have gait abnormalities indicating she is at risk for falls, making slow but steady progress toward her gait goal. Pt cont to benefit from PT treatments to improve BLE strength, endurance, gait and balance to improve her function and safety and prevent falls.    Written Teaching Material Utilized: home health handbook with phone numbers, fall prevention strategies, HEP   Interdisciplinary communication with: none   Discharge planning as follows: d/c to home

## 2023-09-19 ENCOUNTER — HOME CARE VISIT (OUTPATIENT)
Facility: HOME HEALTH | Age: 88
End: 2023-09-19
Payer: MEDICARE

## 2023-09-19 VITALS
HEART RATE: 62 BPM | DIASTOLIC BLOOD PRESSURE: 75 MMHG | SYSTOLIC BLOOD PRESSURE: 110 MMHG | RESPIRATION RATE: 18 BRPM | OXYGEN SATURATION: 96 % | TEMPERATURE: 98 F

## 2023-09-19 PROCEDURE — G0300 HHS/HOSPICE OF LPN EA 15 MIN: HCPCS

## 2023-09-19 NOTE — HOME HEALTH
Subjective: I'm fine  Falls since last visit No(if yes complete the Fall Tracking Form and include bsrifallreport):   Caregiver involvement changes: no  Home health supplies by type and quantity ordered/delivered this visit include: no    Clinician asked if patient has had any physician contact since last home care visit and patient states: NO  Clinician asked if patient has any new or changed medications and patient states:  NO   If Yes, were medications reconciled? NO   Was the certifying physician notified of changes in medications? NO     Clinical assessment (what this visit means for the patient overall and need for ongoing skilled care) and progress or lack of progress towards SPECIFIC goals: pt with metabolic encephalopathy in need of sn for continued assess/monitor/teach     Written Teaching Material Utilized: N/A    Interdisciplinary communication with: N/A for the purpose of na    Discharge planning as follows:  Will discharge when the patient has reached their maximum functional potential and maximum safety in their home    Specific plan for next visit: assess/monitor/teach for safety/mental

## 2023-09-20 ENCOUNTER — HOME CARE VISIT (OUTPATIENT)
Facility: HOME HEALTH | Age: 88
End: 2023-09-20
Payer: MEDICARE

## 2023-09-20 VITALS
RESPIRATION RATE: 16 BRPM | TEMPERATURE: 98.5 F | HEART RATE: 67 BPM | SYSTOLIC BLOOD PRESSURE: 120 MMHG | DIASTOLIC BLOOD PRESSURE: 70 MMHG | OXYGEN SATURATION: 96 %

## 2023-09-20 PROCEDURE — G0151 HHCP-SERV OF PT,EA 15 MIN: HCPCS

## 2023-09-20 NOTE — HOME HEALTH
Subjective: Pt's caregiver, Jamey Limon, reports pt is \"better today\". Pt is actually walking in the hallway without an AD as PT arrived and Jamey Limon was getting the door for PT. Pt has dementia and needs constant supervision to remember to use her RW. Caregivers are aware that pt is a fall risk and will not remember her RW without assistance however she only has caregivers from 8:30am to 12:30 pm daily and then in the evening for an additional 4 hours daily. Falls since last visit: no falls since returning home   Caregiver involvement changes: paid caregiver, Jamey Limon, is present and son, Rafat Mason, lives behind pt and checks on her daily. Home health supplies by type and quantity ordered/delivered this visit include: none   Clinician asked if patient has had any physician contact since last home care visit and patient states: no   Clinician asked if patient has any new or changed medications and patient states: yes   If Yes, were medications reconciled? yes   Was the certifying physician notified of changes in medications? n/a - MD ordered cephalexin for pt to take 2 x day for UTI.  PT updated med list  Clinical assessment (what this visit means for the patient overall and need for ongoing skilled care) and progress or lack of progress towards SPECIFIC goals: Pt was able to perform all ther ex today however she cont to require cues for proper technique, making slow but steady progres toward her strength goal. Pt was able to transfer with improved technique after cues however she cont to require assistance, making slow but steady progress toward her transfer goal. Pt was able to perform her balance ex however she cont to present with balance deficits, indicating she cont to be at risk for falls, making slow but steady progress toward her balance goal. Pt was able to amb with RW and SPC inside and outside however she cont to have LOB amb with SPC and have gait abnormalities indicating she is at risk for falls, making slow but steady

## 2023-09-21 ENCOUNTER — HOME CARE VISIT (OUTPATIENT)
Dept: HOME HEALTH SERVICES | Facility: HOME HEALTH | Age: 88
End: 2023-09-21
Payer: MEDICARE

## 2023-09-26 ENCOUNTER — HOME CARE VISIT (OUTPATIENT)
Facility: HOME HEALTH | Age: 88
End: 2023-09-26
Payer: MEDICARE

## 2023-09-26 VITALS
TEMPERATURE: 98 F | DIASTOLIC BLOOD PRESSURE: 75 MMHG | RESPIRATION RATE: 18 BRPM | SYSTOLIC BLOOD PRESSURE: 123 MMHG | HEART RATE: 89 BPM | OXYGEN SATURATION: 99 %

## 2023-09-26 VITALS
TEMPERATURE: 98.3 F | HEART RATE: 64 BPM | DIASTOLIC BLOOD PRESSURE: 74 MMHG | RESPIRATION RATE: 16 BRPM | OXYGEN SATURATION: 95 % | SYSTOLIC BLOOD PRESSURE: 132 MMHG

## 2023-09-26 PROCEDURE — G0299 HHS/HOSPICE OF RN EA 15 MIN: HCPCS

## 2023-09-26 PROCEDURE — G0151 HHCP-SERV OF PT,EA 15 MIN: HCPCS

## 2023-09-26 NOTE — HOME HEALTH
Subjective: Pt reports she has \"no aches or pains\". Pt's caregiver, Lilly Hannah, reports pt is \"doing well today\". Pt has dementia and needs constant supervision to remember to use her RW. Caregivers are aware that pt is a fall risk and will not remember her RW without assistance however she only has caregivers from 8:30am to 12:30 pm daily and then in the evening for an additional 4 hours daily. Falls since last visit: no falls since returning home   Caregiver involvement changes: paid caregiver, Lilly Hannah, is present and son, Yvon Nobles, lives behind pt and checks on her daily. Home health supplies by type and quantity ordered/delivered this visit include: none   Clinician asked if patient has had any physician contact since last home care visit and patient states: no   Clinician asked if patient has any new or changed medications and patient states: no  If Yes, were medications reconciled? n/a  Was the certifying physician notified of changes in medications? n/a   Clinical assessment (what this visit means for the patient overall and need for ongoing skilled care) and progress or lack of progress towards SPECIFIC goals: Pt was able to perform all ther ex today however she cont to require cues for proper technique, making slow but steady progres toward her strength goal. Pt was able to transfer with improved technique after cues with less assistance, making steady progress toward her transfer goal. Pt was able to perform additional balance ex however she cont to present with balance deficits, indicating she cont to be at risk for falls, making slow but steady progress toward her balance goal. Pt was able to amb with RW and SPC inside and outside however she cont to have gait abnormalities indicating she is at risk for falls, making slow but steady progress toward her gait goal. Pt cont to benefit from PT treatments to improve BLE strength, endurance, gait and balance to improve her function and safety and prevent falls.

## 2023-09-28 ENCOUNTER — HOME CARE VISIT (OUTPATIENT)
Facility: HOME HEALTH | Age: 88
End: 2023-09-28
Payer: MEDICARE

## 2023-09-28 VITALS
RESPIRATION RATE: 16 BRPM | SYSTOLIC BLOOD PRESSURE: 130 MMHG | DIASTOLIC BLOOD PRESSURE: 76 MMHG | TEMPERATURE: 99.2 F | OXYGEN SATURATION: 95 % | HEART RATE: 69 BPM

## 2023-09-28 PROCEDURE — G0151 HHCP-SERV OF PT,EA 15 MIN: HCPCS

## 2023-09-28 ASSESSMENT — ENCOUNTER SYMPTOMS
DYSPNEA ACTIVITY LEVEL: AFTER AMBULATING 10 - 20 FT
STOOL DESCRIPTION: FORMED

## 2023-09-28 NOTE — HOME HEALTH
Subjective: Patient states she feels she is doing well. Falls since last visit No(if yes complete the Fall Tracking Form and include bsrifallreport):   Caregiver involvement changes: N/a  Home health supplies by type and quantity ordered/delivered this visit include: N/A    Clinician asked if patient has had any physician contact since last home care visit and patient states: NO  Clinician asked if patient has any new or changed medications and patient states:  N/A   If Yes, were medications reconciled? N/A   Was the certifying physician notified of changes in medications? N/A no    Clinical assessment (what this visit means for the patient overall and need for ongoing skilled care) and progress or lack of progress towards SPECIFIC goals: Patients goals have been met, and patient is dsicarged from SN. Written Teaching Material Utilized: N/A    Interdisciplinary communication with: N/A    Discharge planning as follows: Discharged.

## 2023-09-28 NOTE — HOME HEALTH
Subjective: Pt reports she is \"fine\". Pt's caregiver, Cruzito Carter, reports pt is \"seeming like she is tired today\". Pt has dementia and needs constant supervision to remember to use her RW. Caregivers are aware that pt is a fall risk and will not remember her RW without assistance however she only has caregivers from 8:30am to 12:30 pm daily and then in the evening for an additional 4 hours daily. Falls since last visit: no falls since returning home   Caregiver involvement changes: paid caregiver, Cruzito Carter, is present and son, Sherryle Bow, lives behind pt and checks on her daily.    Home health supplies by type and quantity ordered/delivered this visit include: none   Clinician asked if patient has had any physician contact since last home care visit and patient states: no   Clinician asked if patient has any new or changed medications and patient states: no  If Yes, were medications reconciled? no  Was the certifying physician notified of changes in medications? n/a   Clinical assessment (what this visit means for the patient overall and need for ongoing skilled care) and progress or lack of progress towards SPECIFIC goals: Pt was able to perform all ther ex today however she cont to require cues for proper technique, making slow but steady progres toward her strength goal. Pt was able to transfer with improved technique after cues, making steady progress toward her transfer goal. Pt was able to perform balance ex however she cont to present with balance deficits, indicating she cont to be at risk for falls, making slow but steady progress toward her balance goal. Pt cont to be at risk for falls however her fall risk is reducing as evidenced by the following:  [facility] 10-7 this is improved from 8 at PT eval however pt is still at risk for falls   [place]-10/28 this is improved from 8/28 at PT eval however pt is still at risk for falls   30 sec sit to stand test, modified with use of BUEs-3 transfers, this is improved from 0

## 2023-10-02 ENCOUNTER — HOME CARE VISIT (OUTPATIENT)
Facility: HOME HEALTH | Age: 88
End: 2023-10-02
Payer: MEDICARE

## 2023-10-02 VITALS
OXYGEN SATURATION: 94 % | TEMPERATURE: 98.7 F | SYSTOLIC BLOOD PRESSURE: 120 MMHG | RESPIRATION RATE: 16 BRPM | DIASTOLIC BLOOD PRESSURE: 72 MMHG | HEART RATE: 62 BPM

## 2023-10-02 PROCEDURE — G0151 HHCP-SERV OF PT,EA 15 MIN: HCPCS

## 2023-10-02 NOTE — HOME HEALTH
Subjective: Pt reports she is \"fine\". PT informed pt's caregiver and sons that pt seemed more tired today, stating \"I can't\" during gait training. Pt's sons arrived at the end of today's treatment and state the pt is probably tired as her  fell last night and pt was standing over  around midnight when son came to check on them. Pt's sons report they are trying to get pt and her  into assisted living as soon as possible. They are aware pt and her  would benefit from additional care and want to get it for them as soon as they can. Caregivers are aware that pt is a fall risk and will not remember her RW without assistance however she only has caregivers from 8:30am to 12:30 pm daily and then in the evening for an additional 4 hours daily. Falls since last visit: no falls since returning home   Caregiver involvement changes: paid caregiver, Constancia Babinski, is present and son, Diana Canela, lives behind pt and checks on her daily.    Home health supplies by type and quantity ordered/delivered this visit include: none   Clinician asked if patient has had any physician contact since last home care visit and patient states: no   Clinician asked if patient has any new or changed medications and patient states: no   If Yes, were medications reconciled? no   Was the certifying physician notified of changes in medications? n/a   Clinical assessment (what this visit means for the patient overall and need for ongoing skilled care) and progress or lack of progress towards SPECIFIC goals: Pt was able to perform all ther ex today however she cont to require cues for proper technique, making slow but steady progres toward her strength goal. Pt was able to transfer with improved technique after cues, making steady progress toward her transfer goal. Pt was able to perform balance ex however she cont to present with balance deficits, indicating she cont to be at risk for falls, making slow but steady progress toward her

## 2023-10-04 ENCOUNTER — HOME CARE VISIT (OUTPATIENT)
Facility: HOME HEALTH | Age: 88
End: 2023-10-04
Payer: MEDICARE

## 2023-10-04 VITALS
SYSTOLIC BLOOD PRESSURE: 118 MMHG | HEART RATE: 69 BPM | RESPIRATION RATE: 16 BRPM | OXYGEN SATURATION: 92 % | TEMPERATURE: 98.6 F | DIASTOLIC BLOOD PRESSURE: 64 MMHG

## 2023-10-04 PROCEDURE — G0151 HHCP-SERV OF PT,EA 15 MIN: HCPCS

## 2023-10-04 NOTE — HOME HEALTH
Subjective: Pt reports she is \"fine\". Caregiver, Constancia Babinski, states the family is having someone from an assisted living facility visit the patient and her  today. She states the family is trying to move them to assisted living in the next 2 months. Falls since last visit: no falls since returning home   Caregiver involvement changes: paid caregiver, Constancia Babinski, is present and son, Diana Canela, lives behind pt and checks on her daily. Home health supplies by type and quantity ordered/delivered this visit include: none   Clinician asked if patient has had any physician contact since last home care visit and patient states: no   Clinician asked if patient has any new or changed medications and patient states: no   If Yes, were medications reconciled? no   Was the certifying physician notified of changes in medications? n/a   Clinical assessment (what this visit means for the patient overall and need for ongoing skilled care) and progress or lack of progress towards SPECIFIC goals: Pt was able to perform all ther ex today however she cont to require cues for proper technique due to her dementia, making slow but steady progres toward her strength goal. Pt was able to transfer with improved technique after cues, making steady progress toward her transfer goal. Pt was able to perform balance ex however she cont to present with balance deficits, indicating she cont to be at risk for falls, making slow but steady progress toward her balance goal. Pt was able to amb farther with RW and SPC inside and outside but she cont to have fatigue and gait abnormalities indicating she is at risk for falls, making slow but steady progress toward her gait goal. Pt cont to benefit from PT treatments to improve BLE strength, endurance, gait and balance to improve her function and safety and prevent falls.    Written Teaching Material Utilized: home health handbook with phone numbers, fall prevention strategies, HEP   Interdisciplinary communication

## 2023-10-09 ENCOUNTER — HOME CARE VISIT (OUTPATIENT)
Facility: HOME HEALTH | Age: 88
End: 2023-10-09
Payer: MEDICARE

## 2023-10-09 VITALS
DIASTOLIC BLOOD PRESSURE: 64 MMHG | SYSTOLIC BLOOD PRESSURE: 116 MMHG | HEART RATE: 64 BPM | RESPIRATION RATE: 16 BRPM | TEMPERATURE: 98.6 F | OXYGEN SATURATION: 95 %

## 2023-10-09 PROCEDURE — G0151 HHCP-SERV OF PT,EA 15 MIN: HCPCS

## 2023-10-09 NOTE — HOME HEALTH
Subjective: Pt reports she is \"fine\". Caregiver, Landy Mahmood, states pt was already in the living room upon her arrival, still in her pjs. Landy Mahmood states that 2 walkers and her cane were in the bedroom when she arrived indicating pt is not using her AD consistently due to her dementia, increasing her risk for falls. Falls since last visit: no falls since returning home   Caregiver involvement changes: paid caregiver, Landy Mahmood, is present and son, Nancy Gomez, lives behind pt and checks on her daily. Home health supplies by type and quantity ordered/delivered this visit include: none   Clinician asked if patient has had any physician contact since last home care visit and patient states: no   Clinician asked if patient has any new or changed medications and patient states: no   If Yes, were medications reconciled? no   Was the certifying physician notified of changes in medications? n/a   Clinical assessment (what this visit means for the patient overall and need for ongoing skilled care) and progress or lack of progress towards SPECIFIC goals: Pt was able to perform all ther ex today however she cont to require cues for proper technique due to her dementia, making slow but steady progres toward her strength goal. Pt was able to transfer with improved technique after cues, making steady progress toward her transfer goal. Pt was able to perform balance ex however she cont to present with balance deficits, indicating she cont to be at risk for falls, making slow but steady progress toward her balance goal. Pt was able to amb with RW and SPC inside and outside but she cont to have fatigue and gait abnormalities indicating she is at risk for falls, making slow but steady progress toward her gait goal. Pt cont to benefit from PT treatments to improve BLE strength, endurance, gait and balance to improve her function and safety and prevent falls.    Written Teaching Material Utilized: home health handbook with phone numbers, fall prevention

## 2023-10-12 ENCOUNTER — HOME CARE VISIT (OUTPATIENT)
Facility: HOME HEALTH | Age: 88
End: 2023-10-12
Payer: MEDICARE

## 2023-10-12 ENCOUNTER — HOSPITAL ENCOUNTER (INPATIENT)
Facility: HOSPITAL | Age: 88
LOS: 2 days | Discharge: HOME | DRG: 690 | End: 2023-10-14
Attending: EMERGENCY MEDICINE | Admitting: STUDENT IN AN ORGANIZED HEALTH CARE EDUCATION/TRAINING PROGRAM
Payer: MEDICARE

## 2023-10-12 ENCOUNTER — APPOINTMENT (OUTPATIENT)
Facility: HOSPITAL | Age: 88
DRG: 690 | End: 2023-10-12
Payer: MEDICARE

## 2023-10-12 VITALS
OXYGEN SATURATION: 94 % | DIASTOLIC BLOOD PRESSURE: 70 MMHG | HEART RATE: 75 BPM | TEMPERATURE: 99.3 F | SYSTOLIC BLOOD PRESSURE: 126 MMHG | RESPIRATION RATE: 16 BRPM

## 2023-10-12 DIAGNOSIS — R31.9 URINARY TRACT INFECTION WITH HEMATURIA, SITE UNSPECIFIED: Primary | ICD-10-CM

## 2023-10-12 DIAGNOSIS — N39.0 URINARY TRACT INFECTION WITH HEMATURIA, SITE UNSPECIFIED: Primary | ICD-10-CM

## 2023-10-12 DIAGNOSIS — G93.41 ACUTE METABOLIC ENCEPHALOPATHY: ICD-10-CM

## 2023-10-12 DIAGNOSIS — N28.9 ACUTE RENAL INSUFFICIENCY: ICD-10-CM

## 2023-10-12 PROBLEM — N30.00 ACUTE INFECTIVE CYSTITIS: Status: ACTIVE | Noted: 2023-10-12

## 2023-10-12 LAB
ALBUMIN SERPL-MCNC: 3.4 G/DL (ref 3.5–5)
ALBUMIN/GLOB SERPL: 0.8 (ref 1.1–2.2)
ALP SERPL-CCNC: 106 U/L (ref 45–117)
ALT SERPL-CCNC: 24 U/L (ref 12–78)
ANION GAP SERPL CALC-SCNC: 7 MMOL/L (ref 5–15)
APPEARANCE UR: ABNORMAL
AST SERPL-CCNC: 24 U/L (ref 15–37)
BACTERIA URNS QL MICRO: ABNORMAL /HPF
BASOPHILS # BLD: 0 K/UL (ref 0–0.1)
BASOPHILS NFR BLD: 0 % (ref 0–1)
BILIRUB SERPL-MCNC: 0.5 MG/DL (ref 0.2–1)
BILIRUB UR QL: NEGATIVE
BUN SERPL-MCNC: 31 MG/DL (ref 6–20)
BUN/CREAT SERPL: 22 (ref 12–20)
CALCIUM SERPL-MCNC: 10 MG/DL (ref 8.5–10.1)
CHLORIDE SERPL-SCNC: 105 MMOL/L (ref 97–108)
CO2 SERPL-SCNC: 26 MMOL/L (ref 21–32)
COLOR UR: ABNORMAL
COMMENT:: NORMAL
CREAT SERPL-MCNC: 1.42 MG/DL (ref 0.55–1.02)
DIFFERENTIAL METHOD BLD: ABNORMAL
EOSINOPHIL # BLD: 0.2 K/UL (ref 0–0.4)
EOSINOPHIL NFR BLD: 3 % (ref 0–7)
EPITH CASTS URNS QL MICRO: ABNORMAL /LPF
ERYTHROCYTE [DISTWIDTH] IN BLOOD BY AUTOMATED COUNT: 13.9 % (ref 11.5–14.5)
GLOBULIN SER CALC-MCNC: 4.2 G/DL (ref 2–4)
GLUCOSE SERPL-MCNC: 100 MG/DL (ref 65–100)
GLUCOSE UR STRIP.AUTO-MCNC: NEGATIVE MG/DL
HCT VFR BLD AUTO: 40.3 % (ref 35–47)
HGB BLD-MCNC: 13.3 G/DL (ref 11.5–16)
HGB UR QL STRIP: ABNORMAL
HYALINE CASTS URNS QL MICRO: ABNORMAL /LPF (ref 0–5)
IMM GRANULOCYTES # BLD AUTO: 0.1 K/UL (ref 0–0.04)
IMM GRANULOCYTES NFR BLD AUTO: 1 % (ref 0–0.5)
KETONES UR QL STRIP.AUTO: NEGATIVE MG/DL
LEUKOCYTE ESTERASE UR QL STRIP.AUTO: ABNORMAL
LYMPHOCYTES # BLD: 1.8 K/UL (ref 0.8–3.5)
LYMPHOCYTES NFR BLD: 22 % (ref 12–49)
MCH RBC QN AUTO: 33.5 PG (ref 26–34)
MCHC RBC AUTO-ENTMCNC: 33 G/DL (ref 30–36.5)
MCV RBC AUTO: 101.5 FL (ref 80–99)
MONOCYTES # BLD: 1.5 K/UL (ref 0–1)
MONOCYTES NFR BLD: 18 % (ref 5–13)
NEUTS SEG # BLD: 4.7 K/UL (ref 1.8–8)
NEUTS SEG NFR BLD: 56 % (ref 32–75)
NITRITE UR QL STRIP.AUTO: NEGATIVE
NRBC # BLD: 0 K/UL (ref 0–0.01)
NRBC BLD-RTO: 0 PER 100 WBC
PH UR STRIP: 6 (ref 5–8)
PLATELET # BLD AUTO: 186 K/UL (ref 150–400)
PMV BLD AUTO: 11.6 FL (ref 8.9–12.9)
POTASSIUM SERPL-SCNC: 3.9 MMOL/L (ref 3.5–5.1)
PROT SERPL-MCNC: 7.6 G/DL (ref 6.4–8.2)
PROT UR STRIP-MCNC: 100 MG/DL
RBC # BLD AUTO: 3.97 M/UL (ref 3.8–5.2)
RBC #/AREA URNS HPF: ABNORMAL /HPF (ref 0–5)
SODIUM SERPL-SCNC: 138 MMOL/L (ref 136–145)
SP GR UR REFRACTOMETRY: 1.01 (ref 1–1.03)
SPECIMEN HOLD: NORMAL
URINE CULTURE IF INDICATED: ABNORMAL
UROBILINOGEN UR QL STRIP.AUTO: 0.2 EU/DL (ref 0.2–1)
WBC # BLD AUTO: 8.2 K/UL (ref 3.6–11)
WBC URNS QL MICRO: >100 /HPF (ref 0–4)

## 2023-10-12 PROCEDURE — 87077 CULTURE AEROBIC IDENTIFY: CPT

## 2023-10-12 PROCEDURE — 1100000000 HC RM PRIVATE

## 2023-10-12 PROCEDURE — 99285 EMERGENCY DEPT VISIT HI MDM: CPT

## 2023-10-12 PROCEDURE — 2580000003 HC RX 258: Performed by: STUDENT IN AN ORGANIZED HEALTH CARE EDUCATION/TRAINING PROGRAM

## 2023-10-12 PROCEDURE — 36415 COLL VENOUS BLD VENIPUNCTURE: CPT

## 2023-10-12 PROCEDURE — G0151 HHCP-SERV OF PT,EA 15 MIN: HCPCS

## 2023-10-12 PROCEDURE — 85025 COMPLETE CBC W/AUTO DIFF WBC: CPT

## 2023-10-12 PROCEDURE — 6360000002 HC RX W HCPCS: Performed by: STUDENT IN AN ORGANIZED HEALTH CARE EDUCATION/TRAINING PROGRAM

## 2023-10-12 PROCEDURE — 71046 X-RAY EXAM CHEST 2 VIEWS: CPT

## 2023-10-12 PROCEDURE — 80053 COMPREHEN METABOLIC PANEL: CPT

## 2023-10-12 PROCEDURE — 87086 URINE CULTURE/COLONY COUNT: CPT

## 2023-10-12 PROCEDURE — 6360000002 HC RX W HCPCS: Performed by: EMERGENCY MEDICINE

## 2023-10-12 PROCEDURE — 6370000000 HC RX 637 (ALT 250 FOR IP): Performed by: STUDENT IN AN ORGANIZED HEALTH CARE EDUCATION/TRAINING PROGRAM

## 2023-10-12 PROCEDURE — 70450 CT HEAD/BRAIN W/O DYE: CPT

## 2023-10-12 PROCEDURE — 81001 URINALYSIS AUTO W/SCOPE: CPT

## 2023-10-12 PROCEDURE — 96365 THER/PROPH/DIAG IV INF INIT: CPT

## 2023-10-12 PROCEDURE — 2580000003 HC RX 258: Performed by: EMERGENCY MEDICINE

## 2023-10-12 RX ORDER — HALOPERIDOL 0.5 MG/1
0.5 TABLET ORAL NIGHTLY
Status: DISCONTINUED | OUTPATIENT
Start: 2023-10-12 | End: 2023-10-14 | Stop reason: HOSPADM

## 2023-10-12 RX ORDER — ACETAMINOPHEN 650 MG/1
650 SUPPOSITORY RECTAL EVERY 6 HOURS PRN
Status: DISCONTINUED | OUTPATIENT
Start: 2023-10-12 | End: 2023-10-14 | Stop reason: HOSPADM

## 2023-10-12 RX ORDER — POLYETHYLENE GLYCOL 3350 17 G/17G
17 POWDER, FOR SOLUTION ORAL DAILY PRN
Status: DISCONTINUED | OUTPATIENT
Start: 2023-10-12 | End: 2023-10-14 | Stop reason: HOSPADM

## 2023-10-12 RX ORDER — ATORVASTATIN CALCIUM 40 MG/1
40 TABLET, FILM COATED ORAL DAILY
Status: DISCONTINUED | OUTPATIENT
Start: 2023-10-12 | End: 2023-10-14 | Stop reason: HOSPADM

## 2023-10-12 RX ORDER — AMLODIPINE BESYLATE 5 MG/1
10 TABLET ORAL DAILY
Status: DISCONTINUED | OUTPATIENT
Start: 2023-10-12 | End: 2023-10-14 | Stop reason: HOSPADM

## 2023-10-12 RX ORDER — SODIUM CHLORIDE 9 MG/ML
INJECTION, SOLUTION INTRAVENOUS PRN
Status: DISCONTINUED | OUTPATIENT
Start: 2023-10-12 | End: 2023-10-14 | Stop reason: HOSPADM

## 2023-10-12 RX ORDER — ESCITALOPRAM OXALATE 10 MG/1
20 TABLET ORAL DAILY
Status: DISCONTINUED | OUTPATIENT
Start: 2023-10-12 | End: 2023-10-14 | Stop reason: HOSPADM

## 2023-10-12 RX ORDER — SODIUM CHLORIDE 0.9 % (FLUSH) 0.9 %
5-40 SYRINGE (ML) INJECTION PRN
Status: DISCONTINUED | OUTPATIENT
Start: 2023-10-12 | End: 2023-10-14 | Stop reason: HOSPADM

## 2023-10-12 RX ORDER — ACETAMINOPHEN 325 MG/1
650 TABLET ORAL EVERY 6 HOURS PRN
Status: DISCONTINUED | OUTPATIENT
Start: 2023-10-12 | End: 2023-10-14 | Stop reason: HOSPADM

## 2023-10-12 RX ORDER — ONDANSETRON 2 MG/ML
4 INJECTION INTRAMUSCULAR; INTRAVENOUS EVERY 6 HOURS PRN
Status: DISCONTINUED | OUTPATIENT
Start: 2023-10-12 | End: 2023-10-14 | Stop reason: HOSPADM

## 2023-10-12 RX ORDER — DONEPEZIL HYDROCHLORIDE 5 MG/1
5 TABLET, FILM COATED ORAL NIGHTLY
Status: DISCONTINUED | OUTPATIENT
Start: 2023-10-12 | End: 2023-10-14 | Stop reason: HOSPADM

## 2023-10-12 RX ORDER — 0.9 % SODIUM CHLORIDE 0.9 %
1000 INTRAVENOUS SOLUTION INTRAVENOUS ONCE
Status: COMPLETED | OUTPATIENT
Start: 2023-10-12 | End: 2023-10-12

## 2023-10-12 RX ORDER — SODIUM CHLORIDE 9 MG/ML
INJECTION, SOLUTION INTRAVENOUS CONTINUOUS
Status: DISCONTINUED | OUTPATIENT
Start: 2023-10-12 | End: 2023-10-13

## 2023-10-12 RX ORDER — ASPIRIN 81 MG/1
81 TABLET ORAL DAILY
Status: DISCONTINUED | OUTPATIENT
Start: 2023-10-12 | End: 2023-10-14 | Stop reason: HOSPADM

## 2023-10-12 RX ORDER — MEMANTINE HYDROCHLORIDE 10 MG/1
10 TABLET ORAL 2 TIMES DAILY
Status: DISCONTINUED | OUTPATIENT
Start: 2023-10-12 | End: 2023-10-14 | Stop reason: HOSPADM

## 2023-10-12 RX ORDER — LEVOTHYROXINE SODIUM 0.07 MG/1
75 TABLET ORAL
Status: DISCONTINUED | OUTPATIENT
Start: 2023-10-13 | End: 2023-10-14 | Stop reason: HOSPADM

## 2023-10-12 RX ORDER — ENOXAPARIN SODIUM 100 MG/ML
30 INJECTION SUBCUTANEOUS DAILY
Status: DISCONTINUED | OUTPATIENT
Start: 2023-10-12 | End: 2023-10-13

## 2023-10-12 RX ORDER — SODIUM CHLORIDE 0.9 % (FLUSH) 0.9 %
5-40 SYRINGE (ML) INJECTION EVERY 12 HOURS SCHEDULED
Status: DISCONTINUED | OUTPATIENT
Start: 2023-10-12 | End: 2023-10-14 | Stop reason: HOSPADM

## 2023-10-12 RX ORDER — ONDANSETRON 4 MG/1
4 TABLET, ORALLY DISINTEGRATING ORAL EVERY 8 HOURS PRN
Status: DISCONTINUED | OUTPATIENT
Start: 2023-10-12 | End: 2023-10-14 | Stop reason: HOSPADM

## 2023-10-12 RX ADMIN — ASPIRIN 81 MG: 81 TABLET, COATED ORAL at 21:09

## 2023-10-12 RX ADMIN — ESCITALOPRAM OXALATE 20 MG: 10 TABLET ORAL at 21:08

## 2023-10-12 RX ADMIN — SODIUM CHLORIDE: 9 INJECTION, SOLUTION INTRAVENOUS at 21:15

## 2023-10-12 RX ADMIN — DONEPEZIL HYDROCHLORIDE 5 MG: 5 TABLET, FILM COATED ORAL at 21:09

## 2023-10-12 RX ADMIN — ENOXAPARIN SODIUM 30 MG: 100 INJECTION SUBCUTANEOUS at 21:16

## 2023-10-12 RX ADMIN — ATORVASTATIN CALCIUM 40 MG: 40 TABLET, FILM COATED ORAL at 21:05

## 2023-10-12 RX ADMIN — SODIUM CHLORIDE 1000 ML: 9 INJECTION, SOLUTION INTRAVENOUS at 19:01

## 2023-10-12 RX ADMIN — HALOPERIDOL 0.5 MG: 0.5 TABLET ORAL at 21:09

## 2023-10-12 RX ADMIN — MEMANTINE 10 MG: 10 TABLET ORAL at 21:09

## 2023-10-12 RX ADMIN — PIPERACILLIN AND TAZOBACTAM 4500 MG: 4; .5 INJECTION, POWDER, LYOPHILIZED, FOR SOLUTION INTRAVENOUS at 19:01

## 2023-10-12 RX ADMIN — CEFEPIME 2000 MG: 2 INJECTION, POWDER, FOR SOLUTION INTRAVENOUS at 21:05

## 2023-10-12 RX ADMIN — Medication 10 ML: at 20:49

## 2023-10-12 ASSESSMENT — ENCOUNTER SYMPTOMS
VOMITING: 0
COUGH: 0
DYSPNEA ACTIVITY LEVEL: AFTER AMBULATING MORE THAN 20 FT
SORE THROAT: 0

## 2023-10-12 ASSESSMENT — PAIN - FUNCTIONAL ASSESSMENT: PAIN_FUNCTIONAL_ASSESSMENT: NONE - DENIES PAIN

## 2023-10-12 NOTE — HOME HEALTH
Subjective: Pt's caregiver reports pt is not feeling well today. Pt has a cold and sounds congested. Pt's caregiver reports she had to put a pillow under pt's seat as pt is having difficulty with transfers as she is weak from her illness. PT notified pt's daughter-in-law, Francisco Javier Silvestre, that pt is not feeling well and does seem weaker than her prior treatment, not wanting to participate in today's treatment as much as usual. Francisco Javier Silvestre states she will get pt to urgent care today. Falls since last visit: no falls since returning home   Caregiver involvement changes: paid caregiver, Odalis Delaney, is present and son, Danisha Sanchez, lives behind pt and checks on her daily. Home health supplies by type and quantity ordered/delivered this visit include: none   Clinician asked if patient has had any physician contact since last home care visit and patient states: no   Clinician asked if patient has any new or changed medications and patient states: no   If Yes, were medications reconciled? yes  Was the certifying physician notified of changes in medications? n/a   Clinical assessment (what this visit means for the patient overall and need for ongoing skilled care) and progress or lack of progress towards SPECIFIC goals: Pt was able to perform 5 reps of ther ex as she is not feeling well today however her strength has improved and she has achieved her max benefit from her strength goal. Pt's transfer technique has improved overall and she has achieved her transfer goal. Pt was able to perform balance ex but not as many exercises as her prior treatment as she is not feeling well today and she cont to present with balance deficits, achieving max benefit from her balance goal. Pt was able to amb inside with [facility] but she cont to have fatigue and gait abnormalities indicating she is at risk for falls.  However, pt's risk for falls has reduced as evidenced by the following:   [facility] 10-7 this is the same as PT re-eval and improved from 8 at PT eval however

## 2023-10-12 NOTE — ED TRIAGE NOTES
Patient arrives via wheelchair with c/o increased weakness and confusion starting this past weekend. She came from her PCP where she tested negative for covid and flu but had a UTI and was sent here for treatment. Hx of dementia and stoke leaving her with difficulty communicating.

## 2023-10-12 NOTE — H&P
History & Physical    Primary Care Provider: Leonard Wynne MD  Source of Information: Patient and chart review    History of Presenting Illness:   Lucho Benedict is a 81 y.o. female with pmh of breast cancer, ckd iii, htn, hypothyroidism who presents to ed with complaints of possible uti. History from patient is difficult to obtain although she is able to answer simple questions. Chart review shows that patient's son has noted increasing confusion from her mild baseline confusion which prompted him to seek evaluation in urgent care. Reportedly had mild rhinorrhea for which she had negative COVID and flu swab at urgent care. Noted to have urinary tract infection at the urgent care and referred to ED given her metabolic encephalopathy. The patient denies any fever, chills, chest or abdominal pain, nausea, vomiting, cough, congestion, recent illness, palpitations, or dysuria. Remarkable vitals on ER Presentation: vss  Labs Remarkable for: cr 1.42, ua: cloudy, small bld, large Les and 2+m bacteria  ER Images: ct head and cxr: no acute process  ER Rx: zosyn, 2l ns bolus     Review of Systems:  Pertinent items are noted in the History of Present Illness. Past Medical History:   Diagnosis Date    Breast CA ([address])     CKD (chronic kidney disease) stage 3, GFR 30-59 ml/min (HCC)     Dilated renal pelvis     Hiatal hernia     Hx of completed stroke     Hypertension     Hypothyroidism     Nephrolithiasis       Past Surgical History:   Procedure Laterality Date    BREAST SURGERY      Left lumpectomy    ORTHOPEDIC SURGERY  2007    left hip replacement     Prior to Admission medications    Medication Sig Start Date End Date Taking? Authorizing Provider   cephALEXin (KEFLEX) 500 MG capsule Take 500 mg by mouth 2 times daily.  9/20/23   Leonard Wynne MD   atorvastatin (LIPITOR) 40 MG tablet Take 1 tablet by mouth daily 8/18/23   ISAIAH Angeles - NP   amLODIPine (NORVASC) 10 MG tablet Take 1 tablet by

## 2023-10-12 NOTE — ED NOTES
1:36 PM  I have evaluated the patient as the Provider in Rapid Medical Evaluation (RME). I have reviewed her vital signs and the triage nurse assessment. I have talked with the patient and any available family and advised that I am the provider in triage and have ordered the appropriate study to initiate their work up based on the clinical presentation during my assessment. I have advised that the patient will be accommodated in the Main ED as soon as possible. I have also requested to contact the triage nurse or myself immediately if the patient experiences any changes in their condition during this brief waiting period. 73-year-old female with past medical history of stroke presents with son with complaint of fatigue, weakness, URI symptoms, and possible UTI. Went to urgent care. Flu and COVID negative. UTI diagnosed. No weakness on exam.  Sensation intact. No antibiotics given.   Told to come here for further eval.    LETICIA Marin PA-C  10/12/23 [id number]

## 2023-10-13 PROBLEM — R41.0 DELIRIUM: Status: ACTIVE | Noted: 2023-10-13

## 2023-10-13 PROBLEM — Z51.5 PALLIATIVE CARE ENCOUNTER: Status: ACTIVE | Noted: 2023-10-13

## 2023-10-13 PROBLEM — Z66 DNR (DO NOT RESUSCITATE): Status: RESOLVED | Noted: 2023-08-16 | Resolved: 2023-10-13

## 2023-10-13 PROBLEM — Z91.81 AT HIGH RISK FOR INJURY RELATED TO FALL: Status: ACTIVE | Noted: 2023-10-13

## 2023-10-13 PROBLEM — Z71.89 DNR (DO NOT RESUSCITATE) DISCUSSION: Status: ACTIVE | Noted: 2023-10-13

## 2023-10-13 PROBLEM — R79.89 TROPONIN LEVEL ELEVATED: Status: RESOLVED | Noted: 2023-08-16 | Resolved: 2023-10-13

## 2023-10-13 LAB
ANION GAP SERPL CALC-SCNC: 5 MMOL/L (ref 5–15)
APPEARANCE UR: CLEAR
BACTERIA URNS QL MICRO: ABNORMAL /HPF
BASOPHILS # BLD: 0 K/UL (ref 0–0.1)
BASOPHILS NFR BLD: 1 % (ref 0–1)
BILIRUB UR QL: NEGATIVE
BUN SERPL-MCNC: 27 MG/DL (ref 6–20)
BUN/CREAT SERPL: 24 (ref 12–20)
CALCIUM SERPL-MCNC: 8.8 MG/DL (ref 8.5–10.1)
CHLORIDE SERPL-SCNC: 110 MMOL/L (ref 97–108)
CO2 SERPL-SCNC: 21 MMOL/L (ref 21–32)
COLOR UR: ABNORMAL
COMMENT:: NORMAL
CREAT SERPL-MCNC: 1.14 MG/DL (ref 0.55–1.02)
DIFFERENTIAL METHOD BLD: ABNORMAL
EOSINOPHIL # BLD: 0.3 K/UL (ref 0–0.4)
EOSINOPHIL NFR BLD: 5 % (ref 0–7)
EPITH CASTS URNS QL MICRO: ABNORMAL /LPF
ERYTHROCYTE [DISTWIDTH] IN BLOOD BY AUTOMATED COUNT: 13.8 % (ref 11.5–14.5)
FERRITIN SERPL-MCNC: 178 NG/ML (ref 8–252)
GLUCOSE SERPL-MCNC: 104 MG/DL (ref 65–100)
GLUCOSE UR STRIP.AUTO-MCNC: NEGATIVE MG/DL
HAPTOGLOB SERPL-MCNC: 189 MG/DL (ref 30–200)
HCT VFR BLD AUTO: 33.2 % (ref 35–47)
HGB BLD-MCNC: 10.7 G/DL (ref 11.5–16)
HGB UR QL STRIP: ABNORMAL
IMM GRANULOCYTES # BLD AUTO: 0 K/UL (ref 0–0.04)
IMM GRANULOCYTES NFR BLD AUTO: 0 % (ref 0–0.5)
IRON SATN MFR SERPL: 20 % (ref 20–50)
IRON SERPL-MCNC: 26 UG/DL (ref 35–150)
KETONES UR QL STRIP.AUTO: NEGATIVE MG/DL
LDH SERPL L TO P-CCNC: 120 U/L (ref 81–246)
LEUKOCYTE ESTERASE UR QL STRIP.AUTO: ABNORMAL
LYMPHOCYTES # BLD: 1.6 K/UL (ref 0.8–3.5)
LYMPHOCYTES NFR BLD: 23 % (ref 12–49)
MCH RBC QN AUTO: 33 PG (ref 26–34)
MCHC RBC AUTO-ENTMCNC: 32.2 G/DL (ref 30–36.5)
MCV RBC AUTO: 102.5 FL (ref 80–99)
MONOCYTES # BLD: 1.3 K/UL (ref 0–1)
MONOCYTES NFR BLD: 18 % (ref 5–13)
NEUTS SEG # BLD: 3.7 K/UL (ref 1.8–8)
NEUTS SEG NFR BLD: 54 % (ref 32–75)
NITRITE UR QL STRIP.AUTO: NEGATIVE
NRBC # BLD: 0 K/UL (ref 0–0.01)
NRBC BLD-RTO: 0 PER 100 WBC
PH UR STRIP: 5.5 (ref 5–8)
PLATELET # BLD AUTO: 138 K/UL (ref 150–400)
PMV BLD AUTO: 11.3 FL (ref 8.9–12.9)
POTASSIUM SERPL-SCNC: 4.5 MMOL/L (ref 3.5–5.1)
PROCALCITONIN SERPL-MCNC: <0.05 NG/ML
PROT UR STRIP-MCNC: ABNORMAL MG/DL
RBC # BLD AUTO: 3.24 M/UL (ref 3.8–5.2)
RBC #/AREA URNS HPF: ABNORMAL /HPF (ref 0–5)
SODIUM SERPL-SCNC: 136 MMOL/L (ref 136–145)
SP GR UR REFRACTOMETRY: 1.01 (ref 1–1.03)
SPECIMEN HOLD: NORMAL
TIBC SERPL-MCNC: 130 UG/DL (ref 250–450)
UROBILINOGEN UR QL STRIP.AUTO: 0.2 EU/DL (ref 0.2–1)
WBC # BLD AUTO: 6.9 K/UL (ref 3.6–11)
WBC URNS QL MICRO: ABNORMAL /HPF (ref 0–4)

## 2023-10-13 PROCEDURE — 87086 URINE CULTURE/COLONY COUNT: CPT

## 2023-10-13 PROCEDURE — 81001 URINALYSIS AUTO W/SCOPE: CPT

## 2023-10-13 PROCEDURE — 83010 ASSAY OF HAPTOGLOBIN QUANT: CPT

## 2023-10-13 PROCEDURE — 51701 INSERT BLADDER CATHETER: CPT

## 2023-10-13 PROCEDURE — 6370000000 HC RX 637 (ALT 250 FOR IP): Performed by: STUDENT IN AN ORGANIZED HEALTH CARE EDUCATION/TRAINING PROGRAM

## 2023-10-13 PROCEDURE — 2580000003 HC RX 258: Performed by: STUDENT IN AN ORGANIZED HEALTH CARE EDUCATION/TRAINING PROGRAM

## 2023-10-13 PROCEDURE — 85025 COMPLETE CBC W/AUTO DIFF WBC: CPT

## 2023-10-13 PROCEDURE — 97165 OT EVAL LOW COMPLEX 30 MIN: CPT

## 2023-10-13 PROCEDURE — 97116 GAIT TRAINING THERAPY: CPT

## 2023-10-13 PROCEDURE — 84145 PROCALCITONIN (PCT): CPT

## 2023-10-13 PROCEDURE — 83615 LACTATE (LD) (LDH) ENZYME: CPT

## 2023-10-13 PROCEDURE — 36415 COLL VENOUS BLD VENIPUNCTURE: CPT

## 2023-10-13 PROCEDURE — 80048 BASIC METABOLIC PNL TOTAL CA: CPT

## 2023-10-13 PROCEDURE — 83550 IRON BINDING TEST: CPT

## 2023-10-13 PROCEDURE — 2580000003 HC RX 258: Performed by: INTERNAL MEDICINE

## 2023-10-13 PROCEDURE — 6360000002 HC RX W HCPCS: Performed by: INTERNAL MEDICINE

## 2023-10-13 PROCEDURE — 1100000000 HC RM PRIVATE

## 2023-10-13 PROCEDURE — 51798 US URINE CAPACITY MEASURE: CPT

## 2023-10-13 PROCEDURE — 99223 1ST HOSP IP/OBS HIGH 75: CPT | Performed by: NURSE PRACTITIONER

## 2023-10-13 PROCEDURE — 97535 SELF CARE MNGMENT TRAINING: CPT

## 2023-10-13 PROCEDURE — 83540 ASSAY OF IRON: CPT

## 2023-10-13 PROCEDURE — 82728 ASSAY OF FERRITIN: CPT

## 2023-10-13 PROCEDURE — 6360000002 HC RX W HCPCS: Performed by: STUDENT IN AN ORGANIZED HEALTH CARE EDUCATION/TRAINING PROGRAM

## 2023-10-13 PROCEDURE — 97161 PT EVAL LOW COMPLEX 20 MIN: CPT

## 2023-10-13 RX ORDER — ENOXAPARIN SODIUM 100 MG/ML
40 INJECTION SUBCUTANEOUS DAILY
Status: DISCONTINUED | OUTPATIENT
Start: 2023-10-14 | End: 2023-10-14 | Stop reason: HOSPADM

## 2023-10-13 RX ADMIN — ENOXAPARIN SODIUM 30 MG: 100 INJECTION SUBCUTANEOUS at 10:09

## 2023-10-13 RX ADMIN — Medication 10 ML: at 20:15

## 2023-10-13 RX ADMIN — MEMANTINE 10 MG: 10 TABLET ORAL at 10:10

## 2023-10-13 RX ADMIN — AMLODIPINE BESYLATE 10 MG: 5 TABLET ORAL at 10:10

## 2023-10-13 RX ADMIN — HALOPERIDOL 0.5 MG: 0.5 TABLET ORAL at 20:14

## 2023-10-13 RX ADMIN — ESCITALOPRAM OXALATE 20 MG: 10 TABLET ORAL at 10:10

## 2023-10-13 RX ADMIN — SODIUM CHLORIDE: 9 INJECTION, SOLUTION INTRAVENOUS at 06:58

## 2023-10-13 RX ADMIN — ASPIRIN 81 MG: 81 TABLET, COATED ORAL at 10:10

## 2023-10-13 RX ADMIN — Medication 10 ML: at 10:09

## 2023-10-13 RX ADMIN — DONEPEZIL HYDROCHLORIDE 5 MG: 5 TABLET, FILM COATED ORAL at 20:14

## 2023-10-13 RX ADMIN — CEFEPIME 2000 MG: 2 INJECTION, POWDER, FOR SOLUTION INTRAVENOUS at 20:14

## 2023-10-13 RX ADMIN — MEMANTINE 10 MG: 10 TABLET ORAL at 20:14

## 2023-10-13 NOTE — CONSULTS
%. PHYSICAL ASSESSMENT:   General: [] Oriented x3  [] Well appearing  [] Intubated  []Ill appearing  [x]Thin frail Other:  Mental Status: [] Normal mental status exam  [] Drowsy  [] Confused  [] Alert Other:  Cardiovascular: [] Regular rate/rhythm  [] Arrhythmia  [] Other:  Chest: [x] Effort normal  []Lungs clear  [] Respiratory distress  []Tachypnea  [] Other:  Abdomen: [x] Soft/non-tender  [] Normal appearance  [] Distended  [] Ascites  [] Other:  Neurological: [] Normal speech  [] Normal sensation  [x]Deficits present:  Extremity: [x] Normal skin color/temp  [] Clubbing/cyanosis  [x] No edema  [] Other: Wt Readings from Last 15 Encounters:   10/13/23 140 lb 6.9 oz (63.7 kg)   09/03/23 145 lb (65.8 kg)   08/23/23 150 lb 9.2 oz (68.3 kg)   08/16/23 143 lb 4.8 oz (65 kg)   10/26/22 147 lb (66.7 kg)   07/08/22 123 lb 6.4 oz (56 kg)        Current Diet: ADULT DIET; Regular       PSYCHOSOCIAL/SPIRITUAL SCREENING:   Palliative IDT has assessed this patient for cultural preferences / practices and a referral made as appropriate to needs (Cultural Services, Patient Advocacy, Ethics, etc.)    Spiritual Affiliation: Buddhist    Any spiritual / Judaism concerns:  [] Yes /  [x] No   If \"Yes\" to discuss with pastoral care during IDT     Does caregiver feel burdened by caring for their loved one:   [] Yes /  [x] No /  [] No Caregiver Present/Available [] No Caregiver [] Pt Lives at Facility  If \"Yes\" to discuss with social work during IDT    Anticipatory grief assessment:   [x] Normal  / [] Maladaptive     If \"Maladaptive\" to discuss with social work during IDT    ESAS Anxiety: Anxiety Score: Not anxious    ESAS Depression: Depression Score: Not depressed        LAB AND IMAGING FINDINGS:   Objective data reviewed:  labs, images, records, medication use, vitals, and chart     FINAL COMMENTS   Thank you for allowing Palliative Medicine to participate in the care of [address] [place] [address].     Only check if applicable and billing time based rather than MDM  [x] The total encounter time on this service date was __70__ minutes which was spent performing a face-to-face encounter and personally completing the provider-level activities documented in the note. This includes time spent prior to the visit and after the visit in direct care of the patient. This time does not include time spent in any separately reportable services.     Electronically signed by   ISAIAH Oquendo - NP  Palliative Care Team  on 10/13/2023 at 12:22 PM

## 2023-10-13 NOTE — ED NOTES
Bedside shift change report given to [address] (oncoming nurse) by Primo Keller RN (offgoing nurse). Report included the following information ED Encounter Summary, ED SBAR, MAR, and Recent Results.       Justin Ortega RN  10/13/23 9706

## 2023-10-13 NOTE — PROGRESS NOTES
Spiritual Care Assessment/Progress Note  [facility]    Name: Andres Sevilla MRN: [id number]    Age: 81 y.o. Sex: female   Language: English     Date: 10/13/2023            Total Time Calculated: 15 min              Spiritual Assessment begun in [facility]  Service Provided For[de-identified] Patient not available  Referral/Consult From[de-identified] Palliative Care  Encounter Overview/Reason : Palliative Care    Spiritual beliefs:      [] Involved in a alhaji tradition/spiritual practice:      [] Supported by a alhaji community:      [] Claims no spiritual orientation:      [] Seeking spiritual identity:           [] Adheres to an individual form of spirituality:      [x] Not able to assess:                Identified resources for coping and support system:   Support System: Unknown       [] Prayer                  [] Devotional reading               [] Music                  [] Guided Imagery     [] Pet visits                                        [] Other: (COMMENT)     Specific area/focus of visit   Encounter:    Crisis:    Spiritual/Emotional needs:    Ritual, Rites and Sacraments:    Grief, Loss, and Adjustments:    Ethics/Mediation:    Behavioral Health:    Palliative Care: Type: Palliative Care, Initial/Spiritual Assessment  Advance Care Planning:        Attempted visit for palliative initial spiritual assessment. Unable to visit patient at this time. Patient was sleeping and did not awake. No family present. Patient's chart was consulted.   Chaplain Lisa, MDiv, MS, [address] [place] [address]

## 2023-10-13 NOTE — PROGRESS NOTES
Lovenox Monitoring  Indication: DVT Prophylaxis  Recent Labs     10/12/23  1343 10/13/23  0056   HGB 13.3 10.7*    138*     Current Weight: 63.7 kg  Est. CrCl = 30 ml/min  Current Dose: 30 mg subcutaneously every 24 hours. Plan: Change to 40 mg sc every 24 hours per protocol for crcl = 10 mL/min.

## 2023-10-13 NOTE — PROGRESS NOTES
Physician Progress Note      PATIENT:               Amrit Cassidy  CSN #:                  [id number]  :                       1935  ADMIT DATE:       10/12/2023 5:31 PM  [facility] DATE:  RESPONDING  PROVIDER #: Andrew Colmenares MD          QUERY TEXT:    Patient admitted with UTI. Noted documentation of metabolic encephalopathy in   H&P and \"acute delirium\" noted on 10/13 PN. In order to support the diagnosis   of metabolic encephalopathy, please include additional clinical indicators in   your documentation, or please document if the diagnosis has been ruled out   after further study. The medical record reflects the following:  Risk Factors: acute illness, advanced age, dementia  Clinical Indicators: pt admitted with UTI  - H&P notes: metabolic encephalopathy  - 10/13 PN: \"Acute delirium / Hx of completed stroke / Dementia - POA, worse   than baseline due to VIRGIL and hospitalization. \"  - ED provider notes son reports increased confusion from baseline  - ED Mental Status: She is alert and oriented to person, place, and time. Treatment: aricept 5 mg nightly, haldol 0.5 mg nightly, namenda 10 mg 2 times   daily, monitoring    Thank you,    Morales Bailey RN  CDI  Options provided:  -- Metabolic encephalopathy present as evidenced by, Please document evidence. -- Metabolic encephalopathy was ruled out  -- Other - I will add my own diagnosis  -- Disagree - Not applicable / Not valid  -- Disagree - Clinically unable to determine / Unknown  -- Refer to Clinical Documentation Reviewer    PROVIDER RESPONSE TEXT:    Metabolic encephalopathy was ruled out after study.     Query created by: Morales Bailey on 10/13/2023 10:37 AM      Electronically signed by:  Andrew Colmenares MD 10/13/2023 11:26 AM

## 2023-10-13 NOTE — PLAN OF CARE
Problem: Physical Therapy - Adult  Goal: By Discharge: Performs mobility at highest level of function for planned discharge setting. See evaluation for individualized goals. Description: FUNCTIONAL STATUS PRIOR TO ADMISSION: Son reports pt was assisted PTA for ADLs, though was able to feed herself. She ambulates with a SPC as HHPT was unable to get pt to consistently use her RW. Pt was receiving HHPT/OT PTA. HOME SUPPORT PRIOR TO ADMISSION: The patient lived alone with her spouse and they both received assistance from home care staff [phone] and [phone]. Pt  is HP s/p CVA. Physical Therapy Goals  Initiated 10/13/2023  1. Patient will move from supine to sit and sit to supine in bed with independence within 7 day(s). 2.  Patient will perform sit to stand with supervision/set-up within 7 day(s). 3.  Patient will transfer from bed to chair and chair to bed with supervision/set-up using the least restrictive device within 7 day(s). 4.  Patient will ambulate with supervision/set-up for 100 feet with the least restrictive device within 7 day(s). 5.  Patient will ascend/descend 8 stairs with handrail(s) per home needs with minimal assistance within 7 day(s). Outcome: Progressing   PHYSICAL THERAPY EVALUATION    Patient: Tamara Mendez (89 y.o. female)  Date: 10/13/2023  Primary Diagnosis: Acute renal insufficiency [N28.9]  Acute infective cystitis [N30.00]  Urinary tract infection with hematuria, site unspecified [N39.0, I97.6]  Acute metabolic encephalopathy [O68.53]       Precautions: Fall Risk, Bed Alarm                    ASSESSMENT :   DEFICITS/IMPAIRMENTS:   The patient is limited by decreased ROM, strength, activity tolerance, safety awareness, cognition, command following, attention/concentration, coordination, balance, and overall functional mobility from reported baseline status.  Ms Octavio Craig is pleasantly confused at this time and agreeable to bed mobility, transfers, gait training, Examination Presentation Decision-Making   LOW Complexity : Zero comorbidities / personal factors that will impact the outcome / POC LOW Complexity : 1-2 Standardized tests and measures addressing body structure, function, activity limitation and / or participation in recreation  LOW Complexity : Stable, uncomplicated  AM-PAC  LOW    Based on the above components, the patient evaluation is determined to be of the following complexity level: Low

## 2023-10-13 NOTE — PLAN OF CARE
Problem: Occupational Therapy - Adult  Goal: By Discharge: Performs self-care activities at highest level of function for planned discharge setting. See evaluation for individualized goals. Description: Occupational Therapy Goals  Initiated: 10/13/2023   1. Patient will perform grooming with supervision/set-up sitting in the chair within 7 day(s). 2.  Patient will perform bathing with mod A from chair within 7 day(s). 3.  Patient will perform upper body dressing and lower body dressing with mod A within 7 day(s). 4.  Patient will perform toilet transfers/BSC with supervision within 7 day(s). 5.  Patient will perform all aspects of toileting with supervision within 7 day(s). FUNCTIONAL STATUS PRIOR TO ADMISSION:    Receives Help From: Family, Personal care attendant, ADL Assistance: Needs assistance,  ,  ,  , Feeding: Independent, Toileting: Needs assistance, Homemaking Assistance: Needs assistance, Ambulation Assistance: Needs assistance, Transfer Assistance: Needs assistance, Active : No     HOME SUPPORT: She was home with home aide staff [phone]). Some provided PLOF as pt unable to provide insight into function at baseline. 10/13/2023 1416 by Kimmie Multani OT  Outcome: Progressing     OCCUPATIONAL THERAPY EVALUATION    Patient: Clay Bound (21 y.o. female)  Date: 10/13/2023  Primary Diagnosis: Acute renal insufficiency [N28.9]  Acute infective cystitis [N30.00]  Urinary tract infection with hematuria, site unspecified [N39.0, H32.5]  Acute metabolic encephalopathy [A44.78]         Precautions: Fall Risk, Bed Alarm                  ASSESSMENT :  The patient is limited by decreased functional mobility, independence in ADLs, ROM, strength, body mechanics, activity tolerance, endurance, cognition, command following, attention/concentration, balance, posture following admission for UTI, CHA, and increased confusion at home.   Pt progressed OOB to the bathroom with min A overall level:  Low

## 2023-10-13 NOTE — PLAN OF CARE
Problem: Safety - Adult  Goal: Free from fall injury  Outcome: Progressing     Problem: Physical Therapy - Adult  Goal: By Discharge: Performs mobility at highest level of function for planned discharge setting. See evaluation for individualized goals. Description: FUNCTIONAL STATUS PRIOR TO ADMISSION: Son reports pt was assisted PTA for ADLs, though was able to feed herself. She ambulates with a SPC as HHPT was unable to get pt to consistently use her RW. Pt was receiving HHPT/OT PTA. HOME SUPPORT PRIOR TO ADMISSION: The patient lived alone with her spouse and they both received assistance from home care staff [phone] and [phone]. Pt  is HP s/p CVA. Physical Therapy Goals  Initiated 10/13/2023  1. Patient will move from supine to sit and sit to supine in bed with independence within 7 day(s). 2.  Patient will perform sit to stand with supervision/set-up within 7 day(s). 3.  Patient will transfer from bed to chair and chair to bed with supervision/set-up using the least restrictive device within 7 day(s). 4.  Patient will ambulate with supervision/set-up for 100 feet with the least restrictive device within 7 day(s). 5.  Patient will ascend/descend 8 stairs with handrail(s) per home needs with minimal assistance within 7 day(s).     10/13/2023 1218 by Rupesh Yip PT  Outcome: Progressing

## 2023-10-14 VITALS
HEART RATE: 71 BPM | SYSTOLIC BLOOD PRESSURE: 133 MMHG | RESPIRATION RATE: 16 BRPM | WEIGHT: 140.43 LBS | DIASTOLIC BLOOD PRESSURE: 64 MMHG | OXYGEN SATURATION: 95 % | BODY MASS INDEX: 24.27 KG/M2 | TEMPERATURE: 97.7 F

## 2023-10-14 LAB
ALBUMIN SERPL-MCNC: 3.2 G/DL (ref 3.5–5)
ALBUMIN/GLOB SERPL: 1 (ref 1.1–2.2)
ALP SERPL-CCNC: 92 U/L (ref 45–117)
ALT SERPL-CCNC: 22 U/L (ref 12–78)
ANION GAP SERPL CALC-SCNC: 4 MMOL/L (ref 5–15)
AST SERPL-CCNC: 25 U/L (ref 15–37)
BACTERIA SPEC CULT: ABNORMAL
BACTERIA SPEC CULT: ABNORMAL
BACTERIA SPEC CULT: NORMAL
BILIRUB SERPL-MCNC: 0.5 MG/DL (ref 0.2–1)
BUN SERPL-MCNC: 16 MG/DL (ref 6–20)
BUN/CREAT SERPL: 18 (ref 12–20)
CALCIUM SERPL-MCNC: 8.8 MG/DL (ref 8.5–10.1)
CC UR VC: ABNORMAL
CHLORIDE SERPL-SCNC: 109 MMOL/L (ref 97–108)
CO2 SERPL-SCNC: 27 MMOL/L (ref 21–32)
CREAT SERPL-MCNC: 0.91 MG/DL (ref 0.55–1.02)
ERYTHROCYTE [DISTWIDTH] IN BLOOD BY AUTOMATED COUNT: 13.5 % (ref 11.5–14.5)
GLOBULIN SER CALC-MCNC: 3.3 G/DL (ref 2–4)
GLUCOSE SERPL-MCNC: 104 MG/DL (ref 65–100)
HCT VFR BLD AUTO: 39.8 % (ref 35–47)
HGB BLD-MCNC: 13 G/DL (ref 11.5–16)
MAGNESIUM SERPL-MCNC: 2.1 MG/DL (ref 1.6–2.4)
MCH RBC QN AUTO: 33.5 PG (ref 26–34)
MCHC RBC AUTO-ENTMCNC: 32.7 G/DL (ref 30–36.5)
MCV RBC AUTO: 102.6 FL (ref 80–99)
NRBC # BLD: 0 K/UL (ref 0–0.01)
NRBC BLD-RTO: 0 PER 100 WBC
PHOSPHATE SERPL-MCNC: 2.6 MG/DL (ref 2.6–4.7)
PLATELET # BLD AUTO: 164 K/UL (ref 150–400)
PMV BLD AUTO: 11 FL (ref 8.9–12.9)
POTASSIUM SERPL-SCNC: 3.7 MMOL/L (ref 3.5–5.1)
PROT SERPL-MCNC: 6.5 G/DL (ref 6.4–8.2)
RBC # BLD AUTO: 3.88 M/UL (ref 3.8–5.2)
SERVICE CMNT-IMP: ABNORMAL
SERVICE CMNT-IMP: NORMAL
SODIUM SERPL-SCNC: 140 MMOL/L (ref 136–145)
WBC # BLD AUTO: 8.7 K/UL (ref 3.6–11)

## 2023-10-14 PROCEDURE — 6360000002 HC RX W HCPCS: Performed by: INTERNAL MEDICINE

## 2023-10-14 PROCEDURE — 80053 COMPREHEN METABOLIC PANEL: CPT

## 2023-10-14 PROCEDURE — 83735 ASSAY OF MAGNESIUM: CPT

## 2023-10-14 PROCEDURE — 36415 COLL VENOUS BLD VENIPUNCTURE: CPT

## 2023-10-14 PROCEDURE — 2580000003 HC RX 258: Performed by: STUDENT IN AN ORGANIZED HEALTH CARE EDUCATION/TRAINING PROGRAM

## 2023-10-14 PROCEDURE — 6370000000 HC RX 637 (ALT 250 FOR IP): Performed by: STUDENT IN AN ORGANIZED HEALTH CARE EDUCATION/TRAINING PROGRAM

## 2023-10-14 PROCEDURE — 85027 COMPLETE CBC AUTOMATED: CPT

## 2023-10-14 PROCEDURE — 84100 ASSAY OF PHOSPHORUS: CPT

## 2023-10-14 RX ORDER — CEPHALEXIN 500 MG/1
500 CAPSULE ORAL 2 TIMES DAILY
Qty: 20 CAPSULE | Refills: 0 | Status: SHIPPED | OUTPATIENT
Start: 2023-10-14 | End: 2023-10-24

## 2023-10-14 RX ADMIN — LEVOTHYROXINE SODIUM 75 MCG: 0.07 TABLET ORAL at 08:37

## 2023-10-14 RX ADMIN — ESCITALOPRAM OXALATE 20 MG: 10 TABLET ORAL at 09:14

## 2023-10-14 RX ADMIN — Medication 10 ML: at 09:15

## 2023-10-14 RX ADMIN — ASPIRIN 81 MG: 81 TABLET, COATED ORAL at 09:15

## 2023-10-14 RX ADMIN — MEMANTINE 10 MG: 10 TABLET ORAL at 09:15

## 2023-10-14 RX ADMIN — ATORVASTATIN CALCIUM 40 MG: 40 TABLET, FILM COATED ORAL at 09:15

## 2023-10-14 RX ADMIN — AMLODIPINE BESYLATE 10 MG: 5 TABLET ORAL at 09:15

## 2023-10-14 RX ADMIN — ENOXAPARIN SODIUM 40 MG: 100 INJECTION SUBCUTANEOUS at 09:16

## 2023-10-14 NOTE — PROGRESS NOTES
[address] [facility]ist Progress Note    NAME: Jacky Felix   :  1935  MRM:  [id number]    Date/Time of service 10/14/2023  7:55 AM    To assist coordination of care and communication with nursing and staff, this note may be preliminary early in the day, but finalized by end of the day. Assessment and Plan:     UTI - POA, now seem unlikely, more likely contamination. Hx of non infected pyuria. NGTD on Ur Cx. Repeat UA/Cx with cath sample also NGTD. Got cefepime. VIRGIL (acute kidney injury) / CKD (chronic kidney disease) stage 3 / Dehydration - POA, likely due to poor po intake of liquids, due to dementia. Recurrent. Hydrated and improved. Discussed with son. Debilitated patient / Fall / Ambulatory dysfunction - POA, chronic issue. PT eval.  Likely back to home ASAP with [address] [place] [address],[address] PT. Minimize narcotics, continue lidoderm, resume NSAIDS     Dilated renal pelvis / Nephrolithiasis / Pyuria - POA. Urology consulted 2 months ago to comment on stone, but requires no intervention. Acute metabolic encephalopathy / Hx of completed stroke / Dementia - POA, worse than baseline due to VIRGIL, UTI and hospitalization. On last admit she had sundowning in setting of dementia. Neurology consult then, and they concur. Return to home ASAP. Continue donepezil, lexapro, memantine, haldol, ASA, statin     Anemia of chronic disease - Unremarkable serologies. Hypothyroidism - Continue synthroid. Slightly elevated TSH when last checked      Hx Hypertension - Resume meds     Hiatal hernia - GERD if symptoms        Subjective:     Chief Complaint:  weakness better    ROS:  (bold if positive, if negative)    Tolerating minimal PT Tolerating some Diet        Objective:     Last 24hrs VS reviewed since prior progress note.  Most recent are:    Vitals:    10/13/23 0646 10/13/23 1000 10/13/23 1152 10/13/23 2015   BP: (!) 152/74 122/65 130/81 (!) 141/78   Pulse: 69 75  70   Resp: 18 16  14 BID    sodium chloride flush 0.9 % injection 5-40 mL  5-40 mL IntraVENous 2 times per day    sodium chloride flush 0.9 % injection 5-40 mL  5-40 mL IntraVENous PRN    0.9 % sodium chloride infusion   IntraVENous PRN    ondansetron (ZOFRAN-ODT) disintegrating tablet 4 mg  4 mg Oral Q8H PRN    Or    ondansetron (ZOFRAN) injection 4 mg  4 mg IntraVENous Q6H PRN    polyethylene glycol (GLYCOLAX) packet 17 g  17 g Oral Daily PRN    acetaminophen (TYLENOL) tablet 650 mg  650 mg Oral Q6H PRN    Or    acetaminophen (TYLENOL) suppository 650 mg  650 mg Rectal Q6H PRN        Lab Data Reviewed: (see below)  Lab Review:     Recent Labs     10/12/23  1343 10/13/23  0056   WBC 8.2 6.9   HGB 13.3 10.7*   HCT 40.3 33.2*    138*       Recent Labs     10/12/23  1343 10/13/23  0056    136   K 3.9 4.5    110*   CO2 26 21   GLUCOSE 100 104*   BUN 31* 27*   CREATININE 1.42* 1.14*   CALCIUM 10.0 8.8   LABALBU 3.4*  --    AST 24  --    ALT 24  --        Lab Results   Component Value Date/Time    GLUCOSE 104 10/13/2023 12:56 AM    GLUCOSE 100 10/12/2023 01:43 PM    GLUCOSE 92 08/17/2023 04:45 AM    GLUCOSE 86 08/16/2023 04:12 AM    GLUCOSE 103 08/15/2023 08:57 AM     No results for input(s): \"PH\", \"PCO2\", \"PO2\", \"HCO3\", \"FIO2\" in the last 72 hours. No results for input(s): \"INR\" in the last 72 hours.   Results       Procedure Component Value Units Date/Time    Culture, Urine [[id number]] Collected: 10/13/23 1109    Order Status: Sent Specimen: Urine, straight catheter Updated: 10/13/23 1333    Culture, Urine [[id number]] Collected: 10/12/23 1812    Order Status: Completed Specimen: Urine Updated: 10/13/23 1402     Special Requests --        NO SPECIAL REQUESTS  Reflexed from [id number]       Flushing count PENDING     Culture       CULTURE IN PROGRESS,FURTHER UPDATES TO FOLLOW                  Other pertinent lab: none    Total time spent with patient: 30 Minutes I personally reviewed chart, notes, data and current

## 2023-10-14 NOTE — PROGRESS NOTES
Patient was discharged --cm was working on home health.  Patient received a response from [address] [place] [address] accepted the referral.

## 2023-10-14 NOTE — PLAN OF CARE
Problem: Discharge Planning  Goal: Discharge to home or other facility with appropriate resources  10/14/2023 0907 by Jen Patricia RN  Outcome: Completed  10/14/2023 0907 by Jen Patricia RN  Outcome: Progressing  10/14/2023 0558 by Emmy Muñoz RN  Outcome: Progressing     Problem: Safety - Adult  Goal: Free from fall injury  10/14/2023 0907 by Jen Patricia RN  Outcome: Completed  10/14/2023 0907 by Jen Patricia RN  Outcome: Progressing  10/14/2023 0558 by Emmy Muñoz RN  Outcome: Progressing     Problem: Chronic Conditions and Co-morbidities  Goal: Patient's chronic conditions and co-morbidity symptoms are monitored and maintained or improved  10/14/2023 0907 by Jen Patricia RN  Outcome: Completed  10/14/2023 0907 by Jen Patricia RN  Outcome: Progressing  10/14/2023 0558 by Emmy Muñoz RN  Outcome: Progressing

## 2023-10-14 NOTE — PROGRESS NOTES
6345: Bedside and verbal shift report completed by Bill Pierce RN to Marley Dias RN.     7844: Assessment completed. Pt repositioned in bed. Son at bedside. 9361: Jamaica Nuñez, verbalized understanding of all discharge instructions. Removed PIV.     0053: Pt discharged in stable condition with all pt belongings. Pt in no acute distress. Family has no further questions.

## 2023-10-14 NOTE — DISCHARGE SUMMARY
Physician Discharge Summary     Patient ID:  Luis M Sweet  [id number]  81 y.o.  1935    Admit date: 10/12/2023    Discharge date of service and time: 10/14/2023  Greater than 30 minutes were spent providing discharge related services for this patient    Admission Diagnoses: Acute renal insufficiency [N28.9]  Acute infective cystitis [N30.00]  Urinary tract infection with hematuria, site unspecified [N39.0, K97.4]  Acute metabolic encephalopathy [I19.08]    Discharge Diagnoses:    Principal Diagnosis   Acute infective cystitis                                             Hospital Course and other diagnoses  UTI - POA, now seem unlikely, more likely contamination. Hx of non infected pyuria. NGTD on Ur Cx. Repeat UA/Cx with cath sample also NGTD. Got cefepime. Resume home keflex     VIRGIL (acute kidney injury) / CKD (chronic kidney disease) stage 3 / Dehydration - POA, likely due to poor po intake of liquids, due to dementia. Recurrent. Hydrated and improved. Discussed with son. Debilitated patient / Fall / Ambulatory dysfunction - POA, chronic issue. PT eval.  Likely back to home ASAP with [facility] PT. Minimize narcotics, continue lidoderm, resume NSAIDS     Dilated renal pelvis / Nephrolithiasis / Pyuria - POA. Urology consulted 2 months ago to comment on stone, but requires no intervention. Acute metabolic encephalopathy / Hx of completed stroke / Dementia - POA, worse than baseline due to VIRGIL, UTI and hospitalization. On last admit she had sundowning in setting of dementia. Neurology consult then, and they concur. Return to home ASAP. Continue donepezil, lexapro, memantine, haldol, ASA, statin     Anemia of chronic disease - Unremarkable serologies. Hypothyroidism - Continue synthroid.  Slightly elevated TSH when last checked      Hx Hypertension - Resume Norvasc     Hiatal hernia - GERD if symptoms      PCP: Feng Arenas MD    Consults: none    Significant Diagnostic Studies: See TYSON SPARROW - AMADOU (before breakfast)      memantine ER (NAMENDA XR) 28 MG CP24 extended release capsule Take 28 mg by mouth daily      senna (SENOKOT) 8.6 MG tablet Take 1 tablet by mouth daily           Activity: activity as tolerated  Diet: regular diet  Wound Care: none needed    Follow-up with your PCP in a few weeks.   Follow-up tests/labs - none    Signed:  Mariposa Milner MD  10/14/2023  8:00 AM

## 2023-10-14 NOTE — PLAN OF CARE
Problem: Discharge Planning  Goal: Discharge to home or other facility with appropriate resources  10/14/2023 0907 by Sofi Ramirez RN  Outcome: Progressing  10/14/2023 0558 by Dayle Kayser, RN  Outcome: Progressing     Problem: Safety - Adult  Goal: Free from fall injury  10/14/2023 0907 by Sofi Ramirez RN  Outcome: Progressing  10/14/2023 0558 by Dayle Kayser, RN  Outcome: Progressing     Problem: Chronic Conditions and Co-morbidities  Goal: Patient's chronic conditions and co-morbidity symptoms are monitored and maintained or improved  10/14/2023 0907 by Sofi Ramirez RN  Outcome: Progressing  10/14/2023 0558 by Dayle Kayser, RN  Outcome: Progressing